# Patient Record
Sex: FEMALE | Race: WHITE | Employment: PART TIME | URBAN - NONMETROPOLITAN AREA
[De-identification: names, ages, dates, MRNs, and addresses within clinical notes are randomized per-mention and may not be internally consistent; named-entity substitution may affect disease eponyms.]

---

## 2020-01-26 ENCOUNTER — APPOINTMENT (OUTPATIENT)
Dept: GENERAL RADIOLOGY | Age: 65
DRG: 189 | End: 2020-01-26
Attending: INTERNAL MEDICINE
Payer: MEDICARE

## 2020-01-26 ENCOUNTER — APPOINTMENT (OUTPATIENT)
Dept: NUCLEAR MEDICINE | Age: 65
DRG: 189 | End: 2020-01-26
Attending: INTERNAL MEDICINE
Payer: MEDICARE

## 2020-01-26 ENCOUNTER — HOSPITAL ENCOUNTER (INPATIENT)
Age: 65
LOS: 5 days | Discharge: NURSING, REHAB OR LONG-TERM CARE | DRG: 189 | End: 2020-01-31
Attending: INTERNAL MEDICINE | Admitting: INTERNAL MEDICINE
Payer: MEDICARE

## 2020-01-26 PROBLEM — J96.01 ACUTE RESPIRATORY FAILURE WITH HYPOXEMIA (HCC): Status: ACTIVE | Noted: 2020-01-26

## 2020-01-26 LAB
ALBUMIN SERPL-MCNC: 3.2 G/DL (ref 3.5–5.1)
ALLEN TEST: POSITIVE
ALP BLD-CCNC: 42 U/L (ref 38–126)
ALT SERPL-CCNC: 8 U/L (ref 11–66)
ANION GAP SERPL CALCULATED.3IONS-SCNC: 15 MEQ/L (ref 8–16)
AST SERPL-CCNC: 23 U/L (ref 5–40)
BASE EXCESS (CALCULATED): -4.5 MMOL/L (ref -2.5–2.5)
BILIRUB SERPL-MCNC: 1.1 MG/DL (ref 0.3–1.2)
BUN BLDV-MCNC: 46 MG/DL (ref 7–22)
CALCIUM SERPL-MCNC: 8.4 MG/DL (ref 8.5–10.5)
CHLORIDE BLD-SCNC: 105 MEQ/L (ref 98–111)
CO2: 21 MEQ/L (ref 23–33)
COLLECTED BY:: ABNORMAL
CREAT SERPL-MCNC: 2.1 MG/DL (ref 0.4–1.2)
D-DIMER QUANTITATIVE: 1051 NG/ML FEU (ref 0–500)
DEVICE: ABNORMAL
GFR SERPL CREATININE-BSD FRML MDRD: 23 ML/MIN/1.73M2
GLUCOSE BLD-MCNC: 97 MG/DL (ref 70–108)
HCO3: 20 MMOL/L (ref 23–28)
IFIO2: 5
LACTIC ACID: 2.8 MMOL/L (ref 0.5–2.2)
MRSA SCREEN RT-PCR: NEGATIVE
O2 SATURATION: 91 %
PCO2: 36 MMHG (ref 35–45)
PH BLOOD GAS: 7.36 (ref 7.35–7.45)
PO2: 64 MMHG (ref 71–104)
POTASSIUM REFLEX MAGNESIUM: 4.5 MEQ/L (ref 3.5–5.2)
SCAN OF BLOOD SMEAR: NORMAL
SODIUM BLD-SCNC: 141 MEQ/L (ref 135–145)
SOURCE, BLOOD GAS: ABNORMAL
TOTAL PROTEIN: 6.2 G/DL (ref 6.1–8)
TROPONIN T: 0.05 NG/ML
VANCOMYCIN RESISTANT ENTEROCOCCUS: POSITIVE

## 2020-01-26 PROCEDURE — 94760 N-INVAS EAR/PLS OXIMETRY 1: CPT

## 2020-01-26 PROCEDURE — 82803 BLOOD GASES ANY COMBINATION: CPT

## 2020-01-26 PROCEDURE — 87641 MR-STAPH DNA AMP PROBE: CPT

## 2020-01-26 PROCEDURE — 2709999900 HC NON-CHARGEABLE SUPPLY

## 2020-01-26 PROCEDURE — 93005 ELECTROCARDIOGRAM TRACING: CPT | Performed by: INTERNAL MEDICINE

## 2020-01-26 PROCEDURE — 84484 ASSAY OF TROPONIN QUANT: CPT

## 2020-01-26 PROCEDURE — 2060000000 HC ICU INTERMEDIATE R&B

## 2020-01-26 PROCEDURE — 80053 COMPREHEN METABOLIC PANEL: CPT

## 2020-01-26 PROCEDURE — A9558 XE133 XENON 10MCI: HCPCS | Performed by: INTERNAL MEDICINE

## 2020-01-26 PROCEDURE — 2700000000 HC OXYGEN THERAPY PER DAY

## 2020-01-26 PROCEDURE — A9540 TC99M MAA: HCPCS | Performed by: INTERNAL MEDICINE

## 2020-01-26 PROCEDURE — 85379 FIBRIN DEGRADATION QUANT: CPT

## 2020-01-26 PROCEDURE — 87081 CULTURE SCREEN ONLY: CPT

## 2020-01-26 PROCEDURE — 2580000003 HC RX 258: Performed by: INTERNAL MEDICINE

## 2020-01-26 PROCEDURE — 78582 LUNG VENTILAT&PERFUS IMAGING: CPT

## 2020-01-26 PROCEDURE — 87500 VANOMYCIN DNA AMP PROBE: CPT

## 2020-01-26 PROCEDURE — 36415 COLL VENOUS BLD VENIPUNCTURE: CPT

## 2020-01-26 PROCEDURE — 81001 URINALYSIS AUTO W/SCOPE: CPT

## 2020-01-26 PROCEDURE — 36600 WITHDRAWAL OF ARTERIAL BLOOD: CPT

## 2020-01-26 PROCEDURE — 6360000002 HC RX W HCPCS: Performed by: INTERNAL MEDICINE

## 2020-01-26 PROCEDURE — 85025 COMPLETE CBC W/AUTO DIFF WBC: CPT

## 2020-01-26 PROCEDURE — 71045 X-RAY EXAM CHEST 1 VIEW: CPT

## 2020-01-26 PROCEDURE — 3430000000 HC RX DIAGNOSTIC RADIOPHARMACEUTICAL: Performed by: INTERNAL MEDICINE

## 2020-01-26 PROCEDURE — 83605 ASSAY OF LACTIC ACID: CPT

## 2020-01-26 PROCEDURE — 6370000000 HC RX 637 (ALT 250 FOR IP): Performed by: INTERNAL MEDICINE

## 2020-01-26 RX ORDER — ROSUVASTATIN CALCIUM 10 MG/1
10 TABLET, COATED ORAL NIGHTLY
Status: DISCONTINUED | OUTPATIENT
Start: 2020-01-26 | End: 2020-01-26

## 2020-01-26 RX ORDER — 0.9 % SODIUM CHLORIDE 0.9 %
1000 INTRAVENOUS SOLUTION INTRAVENOUS ONCE
Status: COMPLETED | OUTPATIENT
Start: 2020-01-26 | End: 2020-01-26

## 2020-01-26 RX ORDER — SODIUM CHLORIDE 0.9 % (FLUSH) 0.9 %
10 SYRINGE (ML) INJECTION PRN
Status: DISCONTINUED | OUTPATIENT
Start: 2020-01-26 | End: 2020-01-31 | Stop reason: HOSPADM

## 2020-01-26 RX ORDER — SIMVASTATIN 80 MG
80 TABLET ORAL NIGHTLY
COMMUNITY
End: 2020-06-24

## 2020-01-26 RX ORDER — XENON XE-133 10 MCI/1
5.4 GAS RESPIRATORY (INHALATION)
Status: COMPLETED | OUTPATIENT
Start: 2020-01-26 | End: 2020-01-26

## 2020-01-26 RX ORDER — HEPARIN SODIUM 1000 [USP'U]/ML
80 INJECTION, SOLUTION INTRAVENOUS; SUBCUTANEOUS PRN
Status: DISCONTINUED | OUTPATIENT
Start: 2020-01-26 | End: 2020-01-30

## 2020-01-26 RX ORDER — LEVOTHYROXINE SODIUM 0.03 MG/1
25 TABLET ORAL DAILY
Status: DISCONTINUED | OUTPATIENT
Start: 2020-01-27 | End: 2020-01-31

## 2020-01-26 RX ORDER — METOPROLOL SUCCINATE 50 MG/1
50 TABLET, EXTENDED RELEASE ORAL DAILY
COMMUNITY
End: 2022-07-20

## 2020-01-26 RX ORDER — HEPARIN SODIUM 10000 [USP'U]/100ML
18 INJECTION, SOLUTION INTRAVENOUS CONTINUOUS
Status: DISCONTINUED | OUTPATIENT
Start: 2020-01-27 | End: 2020-01-30

## 2020-01-26 RX ORDER — MORPHINE SULFATE 2 MG/ML
2 INJECTION, SOLUTION INTRAMUSCULAR; INTRAVENOUS
Status: DISCONTINUED | OUTPATIENT
Start: 2020-01-26 | End: 2020-01-31 | Stop reason: HOSPADM

## 2020-01-26 RX ORDER — PHENYTOIN SODIUM 100 MG/1
300 CAPSULE, EXTENDED RELEASE ORAL NIGHTLY
Status: DISCONTINUED | OUTPATIENT
Start: 2020-01-26 | End: 2020-01-26

## 2020-01-26 RX ORDER — ONDANSETRON 2 MG/ML
4 INJECTION INTRAMUSCULAR; INTRAVENOUS EVERY 4 HOURS PRN
Status: DISCONTINUED | OUTPATIENT
Start: 2020-01-26 | End: 2020-01-31 | Stop reason: HOSPADM

## 2020-01-26 RX ORDER — HEPARIN SODIUM 1000 [USP'U]/ML
80 INJECTION, SOLUTION INTRAVENOUS; SUBCUTANEOUS ONCE
Status: COMPLETED | OUTPATIENT
Start: 2020-01-27 | End: 2020-01-27

## 2020-01-26 RX ORDER — IPRATROPIUM BROMIDE AND ALBUTEROL SULFATE 2.5; .5 MG/3ML; MG/3ML
1 SOLUTION RESPIRATORY (INHALATION) EVERY 4 HOURS PRN
Status: DISCONTINUED | OUTPATIENT
Start: 2020-01-26 | End: 2020-01-31

## 2020-01-26 RX ORDER — SODIUM CHLORIDE 9 MG/ML
INJECTION, SOLUTION INTRAVENOUS CONTINUOUS
Status: DISCONTINUED | OUTPATIENT
Start: 2020-01-26 | End: 2020-01-30

## 2020-01-26 RX ORDER — HYDROCODONE BITARTRATE AND ACETAMINOPHEN 5; 325 MG/1; MG/1
1 TABLET ORAL EVERY 4 HOURS PRN
Status: DISCONTINUED | OUTPATIENT
Start: 2020-01-26 | End: 2020-01-31

## 2020-01-26 RX ORDER — LEVETIRACETAM 1000 MG/1
TABLET ORAL 2 TIMES DAILY
COMMUNITY

## 2020-01-26 RX ORDER — PHENOBARBITAL 32.4 MG/1
97.2 TABLET ORAL DAILY
Status: DISCONTINUED | OUTPATIENT
Start: 2020-01-26 | End: 2020-01-26

## 2020-01-26 RX ORDER — LEVETIRACETAM 500 MG/1
1000 TABLET ORAL 2 TIMES DAILY
Status: DISCONTINUED | OUTPATIENT
Start: 2020-01-26 | End: 2020-01-31

## 2020-01-26 RX ORDER — ONDANSETRON 2 MG/ML
4 INJECTION INTRAMUSCULAR; INTRAVENOUS EVERY 6 HOURS PRN
Status: DISCONTINUED | OUTPATIENT
Start: 2020-01-26 | End: 2020-01-26 | Stop reason: SDUPTHER

## 2020-01-26 RX ORDER — HYDROCODONE BITARTRATE AND ACETAMINOPHEN 5; 325 MG/1; MG/1
2 TABLET ORAL EVERY 4 HOURS PRN
Status: DISCONTINUED | OUTPATIENT
Start: 2020-01-26 | End: 2020-01-31

## 2020-01-26 RX ORDER — SODIUM CHLORIDE 0.9 % (FLUSH) 0.9 %
10 SYRINGE (ML) INJECTION EVERY 12 HOURS SCHEDULED
Status: DISCONTINUED | OUTPATIENT
Start: 2020-01-26 | End: 2020-01-31 | Stop reason: HOSPADM

## 2020-01-26 RX ORDER — ASPIRIN 81 MG/1
81 TABLET ORAL DAILY
Status: ON HOLD | COMMUNITY
End: 2020-01-31 | Stop reason: HOSPADM

## 2020-01-26 RX ORDER — SIMVASTATIN 40 MG
80 TABLET ORAL NIGHTLY
Status: DISCONTINUED | OUTPATIENT
Start: 2020-01-26 | End: 2020-01-31

## 2020-01-26 RX ORDER — HEPARIN SODIUM 1000 [USP'U]/ML
40 INJECTION, SOLUTION INTRAVENOUS; SUBCUTANEOUS PRN
Status: DISCONTINUED | OUTPATIENT
Start: 2020-01-26 | End: 2020-01-30

## 2020-01-26 RX ADMIN — SODIUM CHLORIDE 1000 ML: 9 INJECTION, SOLUTION INTRAVENOUS at 18:49

## 2020-01-26 RX ADMIN — Medication 3 MILLICURIE: at 22:44

## 2020-01-26 RX ADMIN — LEVETIRACETAM 1000 MG: 500 TABLET, FILM COATED ORAL at 22:02

## 2020-01-26 RX ADMIN — SODIUM CHLORIDE: 9 INJECTION, SOLUTION INTRAVENOUS at 23:12

## 2020-01-26 RX ADMIN — CEFTRIAXONE SODIUM 1 G: 1 INJECTION, POWDER, FOR SOLUTION INTRAMUSCULAR; INTRAVENOUS at 23:54

## 2020-01-26 RX ADMIN — SIMVASTATIN 80 MG: 40 TABLET, FILM COATED ORAL at 22:02

## 2020-01-26 RX ADMIN — XENON XE-133 5.4 MILLICURIE: 10 GAS RESPIRATORY (INHALATION) at 22:37

## 2020-01-26 RX ADMIN — CALCIUM ACETATE 667 MG: 667 CAPSULE ORAL at 22:02

## 2020-01-26 ASSESSMENT — PAIN DESCRIPTION - ONSET
ONSET: GRADUAL
ONSET: GRADUAL

## 2020-01-26 ASSESSMENT — PAIN DESCRIPTION - DESCRIPTORS
DESCRIPTORS: SORE;DISCOMFORT
DESCRIPTORS: SORE;DISCOMFORT

## 2020-01-26 ASSESSMENT — PAIN DESCRIPTION - PAIN TYPE
TYPE: SURGICAL PAIN
TYPE: SURGICAL PAIN

## 2020-01-26 ASSESSMENT — PAIN - FUNCTIONAL ASSESSMENT
PAIN_FUNCTIONAL_ASSESSMENT: PREVENTS OR INTERFERES WITH MANY ACTIVE NOT PASSIVE ACTIVITIES
PAIN_FUNCTIONAL_ASSESSMENT: PREVENTS OR INTERFERES WITH MANY ACTIVE NOT PASSIVE ACTIVITIES

## 2020-01-26 ASSESSMENT — PAIN DESCRIPTION - ORIENTATION
ORIENTATION: LEFT
ORIENTATION: LEFT

## 2020-01-26 ASSESSMENT — PAIN DESCRIPTION - LOCATION
LOCATION: KNEE
LOCATION: KNEE

## 2020-01-26 ASSESSMENT — PAIN SCALES - GENERAL
PAINLEVEL_OUTOF10: 3
PAINLEVEL_OUTOF10: 3

## 2020-01-26 ASSESSMENT — PAIN DESCRIPTION - PROGRESSION: CLINICAL_PROGRESSION: NOT CHANGED

## 2020-01-26 ASSESSMENT — PAIN DESCRIPTION - FREQUENCY
FREQUENCY: CONTINUOUS
FREQUENCY: CONTINUOUS

## 2020-01-26 NOTE — FLOWSHEET NOTE
Doctor up to see patient.  Received verbal order for 0.9 bolus at 250 mL/hr.      01/26/20 1895   Provider Notification   Reason for Communication Evaluate   Provider Name Dr. Michelle Wilks   Provider Notification Physician   Method of Communication Secure Message   Notification Time 3196

## 2020-01-26 NOTE — H&P
Internal Medicine  History and Physical    Patient:  Gigi Crum  MRN: [id number]      History Obtained From:  patient  PCP: Hari Miller MD    CHIEF COMPLAINT:  SOB, hypoxemia, afib, hypotension post op    HISTORY OF PRESENT ILLNESS:   The patient is a 75 y.o. female who had a left TKR done at [facility] 2 days ago. She was recuperating well when she suddenly became hypoxemic, hypotensive and flipped into a-fib with RVR-HR in the 150s today. She was bolused with IVF and transferred to acute bed at [facility] for further care. She reports pain in the rt flank, she has SOB, mild cough, no CP, no hemoptysis. She admitted to a h/o paroxysmal a fib in the past.    Past Medical History:        Diagnosis Date    Hyperlipidemia     Hypertension     Hypothyroidism     Obesity (BMI 30-39. 9)     Seizures ([address]     last sz 20+years ago    Vitamin D deficiency        Past Surgical History:        Procedure Laterality Date    APPENDECTOMY  1978    CATARACT REMOVAL  2004   [facility]    JOINT REPLACEMENT  7/6/12    left hip       Medications Prior to Admission:    Prior to Admission medications    Medication Sig Start Date End Date Taking? Authorizing Provider   simvastatin (ZOCOR) 80 MG tablet Take 80 mg by mouth nightly    Historical Provider, MD   metoprolol succinate (TOPROL XL) 50 MG extended release tablet Take 50 mg by mouth daily    Historical Provider, MD   levETIRAcetam (KEPPRA) 1000 MG tablet Take 1,000 mg by mouth 2 times daily    Historical Provider, MD   aspirin 81 MG EC tablet Take 81 mg by mouth daily    Historical Provider, MD   ibuprofen (ADVIL;MOTRIN) 200 MG tablet Take 400 mg by mouth every 8 hours as needed     Historical Provider, MD   calcium acetate (PHOSLO) 667 MG capsule Take  by mouth 2 times daily. Historical Provider, MD   phenytoin (DILANTIN) 100 MG ER capsule Take 300 mg by mouth nightly.       Historical Provider, MD   vitamin D (CHOLECALCIFEROL) 1000 UNIT TABS tablet Take 1,000 Units by mouth 3 times daily. Historical Provider, MD   levothyroxine (SYNTHROID) 25 MCG tablet Take 25 mcg by mouth Daily. Historical Provider, MD       Allergies:  Latex and Pcn [penicillins]    Social History:   TOBACCO:   reports that she has never smoked. She has never used smokeless tobacco.  ETOH:   reports no history of alcohol use. Family History:       Problem Relation Age of Onset    Arthritis Father     Arthritis Mother     Diabetes Brother        REVIEW OF SYSTEMS:  CONSTITUTIONAL:  negative for  fevers and chills  EYES:  negative for  irritation and redness  HEENT:  negative for  sore throat and hoarseness  RESPIRATORY:  positive for  dry cough and dyspnea  negative for  wheezing, hemoptysis and chest pain  CARDIOVASCULAR:  negative for  palpitations, orthopnea, PND  GASTROINTESTINAL:  negative for nausea and vomiting  GENITOURINARY:  negative for frequency, dysuria and hematuria  ENDOCRINE:  negative for heat intolerance and cold intolerance  MUSCULOSKELETAL:  positive for  Left knee pain post op  negative for  myalgias  NEUROLOGICAL:  negative for headaches, seizures and memory problems  BEHAVIOR/PSYCH:  negative for increased agitation and anxiety    Physical Exam:    Vitals: BP (!) 89/56   Pulse 152   Temp 98.3 °F (36.8 °C) (Oral)   Resp 18   SpO2 90% Comment: Simultaneous filing. User may not have seen previous data.   CONSTITUTIONAL:  fatigued, alert, cooperative, mild distress and mildly obese  EYES:  extra-ocular muscles intact  ENT:  normocepalic, without obvious abnormality  NECK:  supple, symmetrical, trachea midline  LUNGS:  tachypneic and diminished breath sounds throughout lungs  CARDIOVASCULAR:  normal S1 and S2 and no edema  ABDOMEN:  normal bowel sounds, soft, non-distended, non-tender and no hepatosplenomegally  MUSCULOSKELETAL:  Dressing to left knee, no calves tenderness  NEUROLOGIC:  Mental Status Exam:  Level of Alertness:

## 2020-01-26 NOTE — FLOWSHEET NOTE
01/26/20 1746   Provider Notification   Reason for Communication Evaluate   Provider Name Dr. Tiffanie Cavazos   Provider Notification Physician   Method of Communication Secure Message   Notification Time    Notified doctor of patient's arrival to [address] [place] [address].

## 2020-01-27 ENCOUNTER — APPOINTMENT (OUTPATIENT)
Dept: INTERVENTIONAL RADIOLOGY/VASCULAR | Age: 65
DRG: 189 | End: 2020-01-27
Attending: INTERNAL MEDICINE
Payer: MEDICARE

## 2020-01-27 ENCOUNTER — APPOINTMENT (OUTPATIENT)
Dept: ULTRASOUND IMAGING | Age: 65
DRG: 189 | End: 2020-01-27
Attending: INTERNAL MEDICINE
Payer: MEDICARE

## 2020-01-27 LAB
ANION GAP SERPL CALCULATED.3IONS-SCNC: 14 MEQ/L (ref 8–16)
APTT: 100 SECONDS (ref 22–38)
APTT: 133.8 SECONDS (ref 22–38)
APTT: > 200 SECONDS (ref 22–38)
BACTERIA: ABNORMAL
BASOPHILS # BLD: 0.4 %
BASOPHILS ABSOLUTE: 0 THOU/MM3 (ref 0–0.1)
BILIRUBIN URINE: ABNORMAL
BLOOD, URINE: ABNORMAL
BUN BLDV-MCNC: 51 MG/DL (ref 7–22)
CALCIUM SERPL-MCNC: 8.2 MG/DL (ref 8.5–10.5)
CASTS: ABNORMAL /LPF
CASTS: ABNORMAL /LPF
CHARACTER, URINE: ABNORMAL
CHLORIDE BLD-SCNC: 106 MEQ/L (ref 98–111)
CHLORIDE, URINE: 30 MEQ/L
CO2: 20 MEQ/L (ref 23–33)
COLOR: ABNORMAL
CREAT SERPL-MCNC: 2.6 MG/DL (ref 0.4–1.2)
CRYSTALS: ABNORMAL
DIFFERENTIAL TYPE: ABNORMAL
DOHLE BODIES: PRESENT
EKG ATRIAL RATE: 136 BPM
EKG ATRIAL RATE: 95 BPM
EKG P AXIS: 55 DEGREES
EKG P-R INTERVAL: 132 MS
EKG Q-T INTERVAL: 326 MS
EKG Q-T INTERVAL: 382 MS
EKG QRS DURATION: 120 MS
EKG QRS DURATION: 120 MS
EKG QTC CALCULATION (BAZETT): 480 MS
EKG QTC CALCULATION (BAZETT): 496 MS
EKG R AXIS: -40 DEGREES
EKG R AXIS: -43 DEGREES
EKG T AXIS: -2 DEGREES
EKG T AXIS: 3 DEGREES
EKG VENTRICULAR RATE: 139 BPM
EKG VENTRICULAR RATE: 95 BPM
EOSINOPHIL # BLD: 0.2 %
EOSINOPHILS ABSOLUTE: 0 THOU/MM3 (ref 0–0.4)
EPITHELIAL CELLS, UA: ABNORMAL /HPF
ERYTHROCYTE [DISTWIDTH] IN BLOOD BY AUTOMATED COUNT: 13.6 % (ref 11.5–14.5)
ERYTHROCYTE [DISTWIDTH] IN BLOOD BY AUTOMATED COUNT: 13.8 % (ref 11.5–14.5)
ERYTHROCYTE [DISTWIDTH] IN BLOOD BY AUTOMATED COUNT: 13.8 % (ref 11.5–14.5)
ERYTHROCYTE [DISTWIDTH] IN BLOOD BY AUTOMATED COUNT: 46.2 FL (ref 35–45)
ERYTHROCYTE [DISTWIDTH] IN BLOOD BY AUTOMATED COUNT: 46.9 FL (ref 35–45)
ERYTHROCYTE [DISTWIDTH] IN BLOOD BY AUTOMATED COUNT: 47.1 FL (ref 35–45)
GFR SERPL CREATININE-BSD FRML MDRD: 18 ML/MIN/1.73M2
GLUCOSE BLD-MCNC: 100 MG/DL (ref 70–108)
GLUCOSE, URINE: NEGATIVE MG/DL
HCT VFR BLD CALC: 39.3 % (ref 37–47)
HCT VFR BLD CALC: 39.8 % (ref 37–47)
HCT VFR BLD CALC: 42.1 % (ref 37–47)
HEMOGLOBIN: 12.2 GM/DL (ref 12–16)
HEMOGLOBIN: 12.2 GM/DL (ref 12–16)
HEMOGLOBIN: 13.1 GM/DL (ref 12–16)
ICTOTEST: NEGATIVE
IMMATURE GRANS (ABS): 0.07 THOU/MM3 (ref 0–0.07)
IMMATURE GRANULOCYTES: 0.6 %
KETONES, URINE: 15
LACTIC ACID: 2.7 MMOL/L (ref 0.5–2.2)
LEUKOCYTE EST, POC: ABNORMAL
LV EF: 58 %
LVEF MODALITY: NORMAL
LYMPHOCYTES # BLD: 5.5 %
LYMPHOCYTES ABSOLUTE: 0.7 THOU/MM3 (ref 1–4.8)
MCH RBC QN AUTO: 28.7 PG (ref 26–33)
MCH RBC QN AUTO: 28.8 PG (ref 26–33)
MCH RBC QN AUTO: 29 PG (ref 26–33)
MCHC RBC AUTO-ENTMCNC: 30.7 GM/DL (ref 32.2–35.5)
MCHC RBC AUTO-ENTMCNC: 31 GM/DL (ref 32.2–35.5)
MCHC RBC AUTO-ENTMCNC: 31.1 GM/DL (ref 32.2–35.5)
MCV RBC AUTO: 92.1 FL (ref 81–99)
MCV RBC AUTO: 92.7 FL (ref 81–99)
MCV RBC AUTO: 94.5 FL (ref 81–99)
MISCELLANEOUS LAB TEST RESULT: ABNORMAL
MONOCYTES # BLD: 6.5 %
MONOCYTES ABSOLUTE: 0.8 THOU/MM3 (ref 0.4–1.3)
NITRITE, URINE: NEGATIVE
NUCLEATED RED BLOOD CELLS: 0 /100 WBC
PATHOLOGIST REVIEW: ABNORMAL
PH UA: 5 (ref 5–9)
PHOSPHORUS: 4.7 MG/DL (ref 2.4–4.7)
PLATELET # BLD: 202 THOU/MM3 (ref 130–400)
PLATELET # BLD: 205 THOU/MM3 (ref 130–400)
PLATELET # BLD: 228 THOU/MM3 (ref 130–400)
PLATELET ESTIMATE: ADEQUATE
PMV BLD AUTO: 10 FL (ref 9.4–12.4)
PMV BLD AUTO: 10 FL (ref 9.4–12.4)
PMV BLD AUTO: 10.4 FL (ref 9.4–12.4)
POIKILOCYTES: ABNORMAL
POTASSIUM SERPL-SCNC: 5.2 MEQ/L (ref 3.5–5.2)
PROTEIN UA: 100 MG/DL
RBC # BLD: 4.21 MILL/MM3 (ref 4.2–5.4)
RBC # BLD: 4.24 MILL/MM3 (ref 4.2–5.4)
RBC # BLD: 4.57 MILL/MM3 (ref 4.2–5.4)
RBC URINE: ABNORMAL /HPF
RENAL EPITHELIAL, UA: ABNORMAL
SEG NEUTROPHILS: 86.8 %
SEGMENTED NEUTROPHILS ABSOLUTE COUNT: 10.5 THOU/MM3 (ref 1.8–7.7)
SODIUM BLD-SCNC: 140 MEQ/L (ref 135–145)
SODIUM URINE: 47 MEQ/L
SPECIFIC GRAVITY UA: > 1.03 (ref 1–1.03)
TOTAL CK: 197 U/L (ref 30–135)
TROPONIN T: 0.1 NG/ML
TROPONIN T: 0.12 NG/ML
TSH SERPL DL<=0.05 MIU/L-ACNC: 1.14 UIU/ML (ref 0.4–4.2)
UROBILINOGEN, URINE: 1 EU/DL (ref 0–1)
WBC # BLD: 11.9 THOU/MM3 (ref 4.8–10.8)
WBC # BLD: 12.1 THOU/MM3 (ref 4.8–10.8)
WBC # BLD: 13.3 THOU/MM3 (ref 4.8–10.8)
WBC UA: ABNORMAL /HPF
YEAST: ABNORMAL

## 2020-01-27 PROCEDURE — 99222 1ST HOSP IP/OBS MODERATE 55: CPT | Performed by: INTERNAL MEDICINE

## 2020-01-27 PROCEDURE — 99223 1ST HOSP IP/OBS HIGH 75: CPT | Performed by: INTERNAL MEDICINE

## 2020-01-27 PROCEDURE — 84100 ASSAY OF PHOSPHORUS: CPT

## 2020-01-27 PROCEDURE — 82436 ASSAY OF URINE CHLORIDE: CPT

## 2020-01-27 PROCEDURE — 76770 US EXAM ABDO BACK WALL COMP: CPT

## 2020-01-27 PROCEDURE — 82550 ASSAY OF CK (CPK): CPT

## 2020-01-27 PROCEDURE — 83605 ASSAY OF LACTIC ACID: CPT

## 2020-01-27 PROCEDURE — 2060000000 HC ICU INTERMEDIATE R&B

## 2020-01-27 PROCEDURE — 80048 BASIC METABOLIC PNL TOTAL CA: CPT

## 2020-01-27 PROCEDURE — 85027 COMPLETE CBC AUTOMATED: CPT

## 2020-01-27 PROCEDURE — 51702 INSERT TEMP BLADDER CATH: CPT

## 2020-01-27 PROCEDURE — 93306 TTE W/DOPPLER COMPLETE: CPT

## 2020-01-27 PROCEDURE — 84484 ASSAY OF TROPONIN QUANT: CPT

## 2020-01-27 PROCEDURE — 93970 EXTREMITY STUDY: CPT

## 2020-01-27 PROCEDURE — 6360000002 HC RX W HCPCS: Performed by: INTERNAL MEDICINE

## 2020-01-27 PROCEDURE — 84443 ASSAY THYROID STIM HORMONE: CPT

## 2020-01-27 PROCEDURE — 85730 THROMBOPLASTIN TIME PARTIAL: CPT

## 2020-01-27 PROCEDURE — 6370000000 HC RX 637 (ALT 250 FOR IP): Performed by: INTERNAL MEDICINE

## 2020-01-27 PROCEDURE — 2580000003 HC RX 258: Performed by: INTERNAL MEDICINE

## 2020-01-27 PROCEDURE — 6370000000 HC RX 637 (ALT 250 FOR IP): Performed by: PHYSICIAN ASSISTANT

## 2020-01-27 PROCEDURE — 2709999900 HC NON-CHARGEABLE SUPPLY

## 2020-01-27 PROCEDURE — 84300 ASSAY OF URINE SODIUM: CPT

## 2020-01-27 PROCEDURE — 36415 COLL VENOUS BLD VENIPUNCTURE: CPT

## 2020-01-27 RX ORDER — FUROSEMIDE 10 MG/ML
40 INJECTION INTRAMUSCULAR; INTRAVENOUS ONCE
Status: COMPLETED | OUTPATIENT
Start: 2020-01-27 | End: 2020-01-27

## 2020-01-27 RX ORDER — LOPERAMIDE HYDROCHLORIDE 2 MG/1
2 CAPSULE ORAL 3 TIMES DAILY PRN
Status: DISCONTINUED | OUTPATIENT
Start: 2020-01-27 | End: 2020-01-31

## 2020-01-27 RX ADMIN — HEPARIN SODIUM 18 UNITS/KG/HR: 10000 INJECTION, SOLUTION INTRAVENOUS at 00:05

## 2020-01-27 RX ADMIN — CALCIUM ACETATE 667 MG: 667 CAPSULE ORAL at 08:05

## 2020-01-27 RX ADMIN — SODIUM CHLORIDE: 9 INJECTION, SOLUTION INTRAVENOUS at 21:11

## 2020-01-27 RX ADMIN — SIMVASTATIN 80 MG: 40 TABLET, FILM COATED ORAL at 20:46

## 2020-01-27 RX ADMIN — HEPARIN SODIUM 6 UNITS/KG/HR: 10000 INJECTION, SOLUTION INTRAVENOUS at 21:13

## 2020-01-27 RX ADMIN — LEVOTHYROXINE SODIUM 25 MCG: 25 TABLET ORAL at 06:22

## 2020-01-27 RX ADMIN — LOPERAMIDE HYDROCHLORIDE 2 MG: 2 CAPSULE ORAL at 17:51

## 2020-01-27 RX ADMIN — LEVETIRACETAM 1000 MG: 500 TABLET, FILM COATED ORAL at 08:05

## 2020-01-27 RX ADMIN — FUROSEMIDE 40 MG: 40 INJECTION, SOLUTION INTRAMUSCULAR; INTRAVENOUS at 13:11

## 2020-01-27 RX ADMIN — LEVETIRACETAM 1000 MG: 500 TABLET, FILM COATED ORAL at 20:46

## 2020-01-27 RX ADMIN — SODIUM CHLORIDE: 9 INJECTION, SOLUTION INTRAVENOUS at 15:53

## 2020-01-27 RX ADMIN — HEPARIN SODIUM 7820 UNITS: 1000 INJECTION INTRAVENOUS; SUBCUTANEOUS at 00:05

## 2020-01-27 RX ADMIN — CALCIUM ACETATE 667 MG: 667 CAPSULE ORAL at 20:46

## 2020-01-27 ASSESSMENT — PAIN SCALES - GENERAL
PAINLEVEL_OUTOF10: 0
PAINLEVEL_OUTOF10: 3

## 2020-01-27 ASSESSMENT — PAIN - FUNCTIONAL ASSESSMENT: PAIN_FUNCTIONAL_ASSESSMENT: PREVENTS OR INTERFERES SOME ACTIVE ACTIVITIES AND ADLS

## 2020-01-27 ASSESSMENT — PAIN DESCRIPTION - ONSET: ONSET: ON-GOING

## 2020-01-27 ASSESSMENT — PAIN DESCRIPTION - FREQUENCY: FREQUENCY: CONTINUOUS

## 2020-01-27 ASSESSMENT — PAIN DESCRIPTION - LOCATION: LOCATION: HIP

## 2020-01-27 ASSESSMENT — PAIN DESCRIPTION - ORIENTATION: ORIENTATION: RIGHT

## 2020-01-27 ASSESSMENT — PAIN DESCRIPTION - PROGRESSION: CLINICAL_PROGRESSION: GRADUALLY WORSENING

## 2020-01-27 ASSESSMENT — PAIN DESCRIPTION - DESCRIPTORS: DESCRIPTORS: ACHING;CONSTANT;DISCOMFORT

## 2020-01-27 ASSESSMENT — PAIN DESCRIPTION - PAIN TYPE: TYPE: SURGICAL PAIN

## 2020-01-27 NOTE — CONSULTS
Narrative    Not on file     Family History          Problem Relation Age of Onset    Arthritis Father     Arthritis Mother     Diabetes Brother      Sleep History    Has sleep apnea, uses a CPAP machine at night. She sees someone from [employer] who manages it. 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 History of tobacco smoking:No  .  History of recreational or IV drug use in the past:NO     History of exposure to coal mines/coal dust: NO  History of exposure to foundry dust/welding: NO  History of exposure to quarry/silica/sandblasting: NO  History of exposure to asbestos/working with breaks/ships: NO  History of exposure to farm dust: NO  History of recent travel to long distances: NO    History of pulmonary embolism in the past: No            History of DVT in the past:No            Riview of systems   General/Constitutional: No recent loss of weight or appetite changes. No fever or chills. HENT: Negative. Eyes: Negative. Upper respiratory tract: No nasal stuffiness or post nasal drip. Lower respiratory tract/ lungs: See HPI for details. No hemoptysis. Cardiovascular: No palpitations or chest pain. Gastrointestinal: Reports some nausea and diarrhea over the weekend, denies vomiting. Neurological: No focal neurologiacal weakness. Extremities: No edema. Musculoskeletal: No complaints. Genitourinary: No complaints. Hematological: Negative. Psychiatric/Behavioral: Negative. Skin: No itching. Vitals     height is 4' 11\" (1.499 m) and weight is 220 lb 8 oz (100 kg). Her oral temperature is 98.4 °F (36.9 °C). Her blood pressure is 113/56 (abnormal) and her pulse is 76. Her respiration is 18 and oxygen saturation is 93%. Body mass index is 44.54 kg/m².     SUPPLEMENTAL O2: O2 Flow Rate (L/min): 6 L/min     I/O        Intake/Output Summary (Last 24 hours) at 1/27/2020 0017  Last data filed at 1/27/2020 0340  Gross per 24 hour   Intake 1874.7 ml   Output 300 ml   Net 1574.7 ml     I/O last 3 completed shifts: In: 1874.7 [P.O.:300; I.V.:1574.7]  Out: 300 [Urine:300]   Patient Vitals for the past 96 hrs (Last 3 readings):   Weight   01/27/20 0340 220 lb 8 oz (100 kg)   01/26/20 1945 215 lb 8 oz (97.8 kg)       Exam   General Appearance: Patient appears moderately built and well nourished in no acute distress  HEENT: Normal, Head is normocephalic, atraumatic. Oropharynx is clear and moist.  No oral thrush. PERRLA  Neck - Supple, No JVD present. No tracheal deviation present. Lungs - Bilateral air entry present. Bilateral aeration good. no wheezes, rales or rhonchi,   Cardiovascular - Irregularly irregular rhythm, normal rate without murmur, gallop or rub. Abdomen - Tenderness in RLQ, soft, nondistended, no masses or organomegaly  Neurologic - Awake, alert, oriented. There are no focal motor or sensory deficits  Extremities - Left leg is wrapped, pt had total knee replacement on Friday. No cyanosis, clubbing or edema. Musculoskeletal: Normal range of motion. Patient exhibits no tenderness. Lymphadenopathy:  No cervical adenopathy. Psychiatric: Patient  has a normal mood and affect. Skin - No bruising or bleeding.     Labs  - Old records and notes have been reviewed in [facility]   ABG  Lab Results   Component Value Date    PH 7.36 01/26/2020    PO2 64 01/26/2020    PCO2 36 01/26/2020    HCO3 20 01/26/2020    O2SAT 91 01/26/2020     Lab Results   Component Value Date    IFIO2 5 01/26/2020     CBC  Recent Labs     01/26/20  1919 01/27/20  0056 01/27/20  0558   WBC 12.1* 13.3* 11.9*   RBC 4.57 4.24 4.21   HGB 13.1 12.2 12.2   HCT 42.1 39.3 39.8   MCV 92.1 92.7 94.5   MCH 28.7 28.8 29.0   MCHC 31.1* 31.0* 30.7*    205 202   MPV 10.0 10.0 10.4      BMP  Recent Labs     01/26/20 1919 01/27/20  0558    140   K 4.5 5.2    106   CO2 21* 20*   BUN 46* 51*   CREATININE 2.1* 2.6*   GLUCOSE 97 CPAP.  3. Hx. Of paroxysmal afib. Rate controlled on metoprolol. Management per primary and cardiology. 4. Pt. Had surgery on Friday 1/24. Management per primary and orthopedics  5. GRANT. Pt. Creatinine 2.6. up from 2.1 on 1/26. Pt. Was given fluids, not a candidate for CTA chest or CT with contrast. Management per primary and nephrology. 6. Management per primary. 7. Management per primary. \"Thank you for asking us to see this patient\"     Case discussed with nurse and patient/family. Questions and concerns addressed. Electronically signed by   Calderon Interiano on 1/27/2020 at 8:33 AM     Addendum by Dr. Judson Damian MD:  I have seen and examined the patient independently. Face to face evaluation and examination was performed. The above evaluation and note has been reviewed. Labs and radiographs were reviewed. I Have discussed with Dr. Calderon Interiano, Medical student about this patient in detail. The above assessment and plan has been reviewed. Please see my modifications mentioned below. My modifications:  She is in no acute distress. Venous duplex scan:  Jan 27, 2020   PROCEDURE: VL DUP LOWER EXTREMITY VENOUS BILATERAL   No evidence of deep venous thrombosis in either lower extremity. Echocardiogram: 1/27/20  Official report is pending. Contra indications for tPA:  -History of hemorrhagic stroke- No  -Active intracranial neoplasm- no   -Recent (<2 months) intracranial surgery or trauma- No  -Active or recent internal bleeding in prior 6 months- No  -Bleeding diathesis- No  -Uncontrolled severe hypertension (systolic BP >945 mmHg or diastolic BP >962 mmHg) - NO  -Nonhemorrhagic stroke within prior 2 months-No  - Surgery within the previous 10 days- Yes.  -Thrombocytopenia (<100,000 platelets per mm3)    Assessment:  Provoked pulmonary embolism. Patient is hemodynamically stable. Continue home CPAP during sleep.   Will obtain old records [facility] or from her DME for review.     Fran Camacho MD 1/27/2020 4:02 PM

## 2020-01-27 NOTE — CONSULTS
[facility]  Consult    Patient's Name/Date of Birth: Rufina Bhatt / 1955 (89 y.o.)    Date: January 27, 2020     Referring Provider: Frandy Shen MD    CHIEF COMPLAINT: afib RVR      HPI: This is a pleasant 75 y.o. female presents with left TKR done 3 days prior. She was doing well post-op and flipped into Afib RVR with hypotension and hypoxemia into the 150s. IV fluids given. Transferred to [facility]. No prior cardiac history, but does admit to PAF. No DVT on duplex. V/q scan is indeterminate probability. Cr 2.6, Trop 0.102--0.122, TSH 1.1, Hgb 12. ECG now NSR RBBB LAD, inferior infarct. She has not taken [address]. Currently, no chest pain, angina, ELLISON, orthopnea, PND, sob at rest, palpitations, LE edema, or syncope. Echo: No results found for this or any previous visit. All labs, EKG's, diagnostic testing and images as well as cardiac cath, stress testing were reviewed during this encounter    Past Medical History:   Diagnosis Date    Hyperlipidemia     Hypertension     Hypothyroidism     Obesity (BMI 30-39. 9)     Seizures ([address]     last sz 20+years ago    Vitamin D deficiency      Past Surgical History:   Procedure Laterality Date    APPENDECTOMY  1978    CATARACT REMOVAL  2004   [facility]    JOINT REPLACEMENT  7/6/12    left hip     Current Facility-Administered Medications   Medication Dose Route Frequency Provider Last Rate Last Dose    calcium acetate (PHOSLO) capsule 667 mg  667 mg Oral BID Frandy Shen MD   667 mg at 01/27/20 0805    levothyroxine (SYNTHROID) tablet 25 mcg  25 mcg Oral Daily Frandy Shen MD   25 mcg at 01/27/20 0622    sodium chloride flush 0.9 % injection 10 mL  10 mL Intravenous 2 times per day Frandy Shen MD        sodium chloride flush 0.9 % injection 10 mL  10 mL Intravenous PRN Frandy Shen MD        magnesium hydroxide (MILK OF MAGNESIA) 400 MG/5ML suspension 30 mL  30 mL Oral Daily PRN Shad Torres MD        0.9 % sodium chloride infusion   Intravenous Continuous Shad Torres  mL/hr at 01/26/20 2312      HYDROcodone-acetaminophen (NORCO) 5-325 MG per tablet 1 tablet  1 tablet Oral Q4H PRN Shad Torres MD        Or    HYDROcodone-acetaminophen (NORCO) 5-325 MG per tablet 2 tablet  2 tablet Oral Q4H PRN Shad Torres MD        morphine (PF) injection 2 mg  2 mg Intravenous Q3H PRN Shad Torres MD        ondansetron (ZOFRAN) injection 4 mg  4 mg Intravenous Q4H PRN Shad Torres MD        ipratropium-albuterol (DUONEB) nebulizer solution 1 ampule  1 ampule Inhalation Q4H PRN Shad Torres MD        levETIRAcetam (KEPPRA) tablet 1,000 mg  1,000 mg Oral BID Shad Torres MD   1,000 mg at 01/27/20 0805    simvastatin (ZOCOR) tablet 80 mg  80 mg Oral Nightly Shad Torres MD   80 mg at 01/26/20 2202    heparin (porcine) injection 7,820 Units  80 Units/kg Intravenous PRN Shad Torres MD        heparin (porcine) injection 3,910 Units  40 Units/kg Intravenous PRN Shad Torres MD        heparin 25,000 units in dextrose 5% 250 mL infusion  18 Units/kg/hr Intravenous Continuous Shad Torres MD 13.7 mL/hr at 01/27/20 0806 14 Units/kg/hr at 01/27/20 0806     Prior to Admission medications    Medication Sig Start Date End Date Taking? Authorizing Provider   simvastatin (ZOCOR) 80 MG tablet Take 80 mg by mouth nightly    Historical Provider, MD   metoprolol succinate (TOPROL XL) 50 MG extended release tablet Take 50 mg by mouth daily    Historical Provider, MD   levETIRAcetam (KEPPRA) 1000 MG tablet Take 1,000 mg by mouth 2 times daily    Historical Provider, MD   aspirin 81 MG EC tablet Take 81 mg by mouth daily    Historical Provider, MD   ibuprofen (ADVIL;MOTRIN) 200 MG tablet Take 400 mg by mouth every 8 hours as needed     Historical Provider, MD   calcium acetate (PHOSLO) 667 MG capsule Take  by mouth 2 times daily.       Historical Provider, MD   phenytoin allowing us to participate in the care of this patient. Please do not hesitate to call us with questions.     Electronically signed by Helen Young MD on 1/27/2020 at 12:23 PM    Interventional Cardiology - [facility]

## 2020-01-27 NOTE — PROGRESS NOTES
Elevated aptt. Spoke to pharmacy to hold for an hour.  Decrease by 4 units/kg/hr and recheck in 4 hours after restarting heparin

## 2020-01-27 NOTE — PROGRESS NOTES
Pt arrived in 4K 24 via direct admit. Complaints: knee pain with movement. IV normal saline infusing into the hand left, condition patent and no redness at a rate of KVO mls/ hour with about 800 mls remaining in the bag. The best day to schedule a follow up Dr appointment is:  Monday a.m. Patient also presents with a nerve block, ropivacaine running into the left lateral groin at 6 mL/hr. The patient is interested in [facility]. [facility] to [facility] program?:  No    Explained patients right to have family, representative or physician notified of their admission. Patient has Declined for physician to be notified. Patient has daughter here for family/representative to be notified.

## 2020-01-27 NOTE — FLOWSHEET NOTE
01/26/20 5662   Provider Notification   Reason for Communication Evaluate   Provider Name Mr. Phuong Chacon   Provider Notification Physician Assistant   Method of Communication Call   Response   (ok to start heparin for PE, watch wounds for oozing ice knee)

## 2020-01-27 NOTE — FLOWSHEET NOTE
01/26/20 2030   Provider Notification   Reason for Communication Evaluate   Provider Name dr. Wallace Albert   Provider Notification Physician   Method of Communication Secure Message   Response Other (Comment)  (bmp. urine sodium and urine chloride in AM)   Notification Time 2030

## 2020-01-27 NOTE — FLOWSHEET NOTE
01/27/20 2174   Provider Notification   Reason for Communication Evaluate   Provider Name Dr. Mark Urena   Provider Notification Physician   Method of Communication Secure Message   Response Other (Comment)  (consult for PE)   Handoff   Communication Given Shift Handoff   Oncoming Nurse/Offgoing Nurse [place] Communication Face to Face   Time Handoff Given 0743   End of Shift Check Performed Yes

## 2020-01-27 NOTE — FLOWSHEET NOTE
01/26/20 4985   Provider Notification   Reason for Communication Evaluate   Provider Name Dr. Belinda Gonzalez   Provider Notification Physician   Method of Communication Secure Message   Response Other (Comment)  (contacted due to vq scan results and update)     vq scan showed Indeterminate probability for pulmonary embolism. increased pt. oxygen from 5L to 6L sat 90-92%. current bp 111/56 after 1L bolus of 0.9. Heparin to be started, dosed by pharmacy. Contact dr. Hill aponte to make sure its okay to start heparin iv. Discontinue lovenox . Notified of low urine output. Physician ordered to insert coyle cath. This nurse asked if he would like bladder scan. Physician denied due to GRANT and wanting strict output for critical care setting.  Pulmonology also to be contacted in the AM

## 2020-01-27 NOTE — CONSULTS
**This is a Medical/ PA/ APRN Student Note and is charted for educational purposes. The non-physician staff attested note is not to be used for billing purposes or to guide patient care. Please see the physician modifications/ attestation for treatment plan/suggestions. This note has been reviewed and feedback has been provided to the student. **          [facility] [address]    Patient's Name/Date of Birth: Debra Antony / 1955 (2 y.o.)    Date: January 27, 2020     Referring Provider: Lyndsey Wiseman MD    CHIEF COMPLAINT:      HPI: This is a pleasant 75 y.o. female with a history of paroxysmal atrial fibrillation presented initially with hypotension, hypoxemia, a fib with RVR post TKR. These have since resolved and she has returned to sinus rhythm. EKG was found to demonstrate inferior infarct, anterolateral infarct, and questionable changes in the anterior leads. Troponins trended upwards 0.054-->0.102-->0.122.     2D echo is pending. Cr 2.6. APTT 200.0. V/Q shows intermediate probable PE. Echo: No results found for this or any previous visit. All labs, EKG's, diagnostic testing and images as well as cardiac cath, stress testing were reviewed during this encounter    Past Medical History:   Diagnosis Date    Hyperlipidemia     Hypertension     Hypothyroidism     Obesity (BMI 30-39. 9)     Seizures ([address]     last sz 20+years ago    Vitamin D deficiency      Past Surgical History:   Procedure Laterality Date    APPENDECTOMY  1978    CATARACT REMOVAL  2004   [facility]    JOINT REPLACEMENT  7/6/12    left hip     Current Facility-Administered Medications   Medication Dose Route Frequency Provider Last Rate Last Dose    calcium acetate (PHOSLO) capsule 667 mg  667 mg Oral BID Lyndsey Wiseman MD   667 mg at 01/27/20 0805    levothyroxine (SYNTHROID) tablet 25 mcg  25 mcg Oral Daily Lyndsey Wiseman MD   25 mcg at 01/27/20 0367  sodium chloride flush 0.9 % injection 10 mL  10 mL Intravenous 2 times per day Irvin Fung MD        sodium chloride flush 0.9 % injection 10 mL  10 mL Intravenous PRN Irvin Fung MD        magnesium hydroxide (MILK OF MAGNESIA) 400 MG/5ML suspension 30 mL  30 mL Oral Daily PRN Irvin Fung MD        0.9 % sodium chloride infusion   Intravenous Continuous Irvin Fung  mL/hr at 01/26/20 2312      HYDROcodone-acetaminophen (NORCO) 5-325 MG per tablet 1 tablet  1 tablet Oral Q4H PRN Irvin Fung MD        Or    HYDROcodone-acetaminophen (NORCO) 5-325 MG per tablet 2 tablet  2 tablet Oral Q4H PRN Irvin Fung MD        morphine (PF) injection 2 mg  2 mg Intravenous Q3H PRN Irvin Fnug MD        ondansetron (ZOFRAN) injection 4 mg  4 mg Intravenous Q4H PRN Irvin Fung MD        ipratropium-albuterol (DUONEB) nebulizer solution 1 ampule  1 ampule Inhalation Q4H PRN Irvin Fung MD        levETIRAcetam (KEPPRA) tablet 1,000 mg  1,000 mg Oral BID Irvin Fung MD   1,000 mg at 01/27/20 0805    simvastatin (ZOCOR) tablet 80 mg  80 mg Oral Nightly Irvin Fung MD   80 mg at 01/26/20 2202    heparin (porcine) injection 7,820 Units  80 Units/kg Intravenous PRN Irvin Fung MD        heparin (porcine) injection 3,910 Units  40 Units/kg Intravenous PRN Irvin Fung MD        heparin 25,000 units in dextrose 5% 250 mL infusion  18 Units/kg/hr Intravenous Continuous Irvin Fung MD 13.7 mL/hr at 01/27/20 0806 14 Units/kg/hr at 01/27/20 0806     Prior to Admission medications    Medication Sig Start Date End Date Taking?  Authorizing Provider   simvastatin (ZOCOR) 80 MG tablet Take 80 mg by mouth nightly    Historical Provider, MD   metoprolol succinate (TOPROL XL) 50 MG extended release tablet Take 50 mg by mouth daily    Historical Provider, MD   levETIRAcetam (KEPPRA) 1000 MG tablet Take 1,000 mg by mouth 2 times daily    Historical Provider, MD   aspirin 81 MG EC tablet Take use: No    Sexual activity: Not on file   Lifestyle    Physical activity:     Days per week: Not on file     Minutes per session: Not on file    Stress: Not on file   Relationships    Social connections:     Talks on phone: Not on file     Gets together: Not on file     Attends [employer] service: Not on file     Active member of club or organization: Not on file     Attends meetings of clubs or organizations: Not on file     Relationship status: Not on file    Intimate partner violence:     Fear of current or ex partner: Not on file     Emotionally abused: Not on file     Physically abused: Not on file     Forced sexual activity: Not on file   Other Topics Concern    Not on file   Social History Narrative    Not on file     ROS:   Constitutional: Denies any recent wt change. Eyes:  Denies any blurring or double vision, no glaucoma  Ears/Nose/Mouth/Throat:  Denies any chronic sinus/rhinitis, bleeding gums  Cardiovascular:  As described above. Respiratory:  Denies any frequent cough, wheezing or coughing up blood  Genitourinary:  Denies difficulty with urination and kidney stones  Gastrointestinal:  Denies any chronic problems with abdominal pain, nausea, vomiting or diarrhea  Musculoskeletal:  Denies any joint pain, back pain, or difficulty walking  Integumentary:  Denies any rash  Neurological:  No numbness or tingling  Endocrine:  Denies any polydipsia. Hematologic/Lymphatic:  Denies any hemorrhage or lymphatic drainage problems. Labs:  CBC:   Recent Labs     01/26/20 1919 01/27/20  0056 01/27/20  0558   WBC 12.1* 13.3* 11.9*   HGB 13.1 12.2 12.2   HCT 42.1 39.3 39.8   MCV 92.1 92.7 94.5    205 202     BMP:   Recent Labs     01/26/20 1919 01/27/20  0558    140   K 4.5 5.2    106   CO2 21* 20*   PHOS  --  4.7   BUN 46* 51*   CREATININE 2.1* 2.6*     Accucheck Glucoses: No results for input(s): POCGLU in the last 72 hours.   Cardiac Enzymes:   Recent Labs     01/27/20  0558

## 2020-01-27 NOTE — PLAN OF CARE
Problem: Falls - Risk of:  Goal: Absence of physical injury  Description  Absence of physical injury  Outcome: Met This Shift     Problem: Pain:  Description  Pain management should include both nonpharmacologic and pharmacologic interventions. Goal: Control of chronic pain  Description  Control of chronic pain  Outcome: Met This Shift     Problem: Falls - Risk of:  Goal: Will remain free from falls  Description  Will remain free from falls  Outcome: Ongoing  Note:   Up with 1 assist and walker. Uses call light appropriatley. Also bed alarm placed     Problem: Pain:  Description  Pain management should include both nonpharmacologic and pharmacologic interventions. Goal: Pain level will decrease  Description  Pain level will decrease  Outcome: Ongoing  Note:   Pain Assessment: 0-10  Pain Level: 3   Patient's Stated Pain Goal: 5   Is pain goal met at this time? Yes     Non-Pharmaceutical Pain Intervention(s): Repositioned, Cold applied    Nerve block on left extremity. Cold applied as well   Goal: Control of acute pain  Description  Control of acute pain  Outcome: Ongoing  Note:   Surgical pain controlled. Has nerve block on left extremity     Problem: Activity:  Goal: Fatigue will decrease  Description  Fatigue will decrease  Outcome: Ongoing  Note:   Pt. Desats to 80's with to much movement     Problem: Cardiac:  Goal: Hemodynamic stability will improve  Description  Hemodynamic stability will improve  Outcome: Ongoing  Note:   Pt was in afib with admission, is now currently in sinus rhythm     Problem: Respiratory:  Goal: Ability to maintain adequate ventilation will improve  Description  Ability to maintain adequate ventilation will improve  Outcome: Ongoing  Note:   Pt. On 6L of oxgen.  Pt wears home bipap during the night 10L weaned in     Problem: Skin Integrity:  Goal: Risk for impaired skin integrity will decrease  Description  Risk for impaired skin integrity will decrease  Outcome: Ongoing  Note: Blanchable redness on buttocks has bruising scattered. Problem: Discharge Planning:  Goal: Discharged to appropriate level of care  Description  Discharged to appropriate level of care  Outcome: Ongoing  Note:   Pt to be discharged home alone with family support     Problem: Mobility - Impaired:  Goal: Mobility will improve  Description  Mobility will improve  Outcome: Ongoing  Note:   Up with help. Immobilizer to be on with ambulation      Problem: Infection - Surgical Site:  Goal: Will show no infection signs and symptoms  Description  Will show no infection signs and symptoms  Outcome: Ongoing  Note:   Dr Dawn Lips dressing in place. Ortho to see this AM     Problem: Physical Regulation:  Goal: Prevent transmision of infection  Description  Prevent transmision of infection  Outcome: Ongoing  Note:   Remains in contact precautions for vre and hx of mrsa     Problem: Urinary Elimination:  Goal: Signs and symptoms of infection will decrease  Description  Signs and symptoms of infection will decrease  Outcome: Ongoing  Note:   Urine dark yellow and hazy with insertion will continue to monitor    Care plan reviewed with patient and family. Patient and family verbalize understanding of the plan of care and contribute to goal setting.

## 2020-01-27 NOTE — PROGRESS NOTES
2103 called nurse med tech,   2104 called radiology on call   2151 got approval by Dr. Yoselyn Aguilar to do vq scan. Pt on 6L currently and was on room air at home, short of breath. Oxygen desats with monvement. Unable to do a cta due to elevated creatnine.  Pt. Has a dimer elevated at 1051  2153 nurse med tech contacted  [phone] patient taken down to vq scan   2310 pt back to 4k

## 2020-01-27 NOTE — CONSULTS
[facility]    [address]  [facility]  1/27/2020 8:26 AM    Pt Name:    Debra Antony  MRN:     [id number]   [id number]  YOB: 1955  Admit Date:    1/26/2020  5:28 PM  Primary Care Physician:  Charles Adams MD    CSN Number:   [id number]    Reason for Consult:  Acute kidney injury  Requesting provider:  Dr. Betzy Red  History:   The patient is a 75 y.o. white female with history of hypertension, atrial fibrillation, obstructive sleep apnea, history of excessive nonsteroidal use including ibuprofen and Mobic who has previously had left hip and right knee replacement back in 2011 and 2012. Patient was admitted at [facility] for elective left knee replacement which underwent uneventfully 3 days ago. Postoperatively patient did fine up until yesterday when she started to have shortness of breath with decreased oxygenation associated with hypotensive episodes. Patient apparently flipped into atrial fibrillation with RVR. She was given IV fluid bolus and then she was transferred to [facility] for further evaluation. Nephrology was asked to see her in consultation for evaluation of elevated serum creatinine. Patient follows with her primary doctor in [facility] area. All my information was obtained this morning by reviewing her medical chart and speaking with the patient and the daughter at bedside. Patient does not recall having had any kidney issues in the past.  I do not have her baseline serum creatinine level. However on this admission her serum creatinine level was noted to be 2.1 and today it has gone up to 2.6. Nephrology was asked to see in consultation for management of elevated serum creatinine and GRANT. Patient denies any history of any diabetes. Denies any history of any kidney stones. Denies any gross hematuria.     Past Medical History:  Past Medical History:   Diagnosis Date    Hyperlipidemia     Authorizing Provider   simvastatin (ZOCOR) 80 MG tablet Take 80 mg by mouth nightly    Historical Provider, MD   metoprolol succinate (TOPROL XL) 50 MG extended release tablet Take 50 mg by mouth daily    Historical Provider, MD   levETIRAcetam (KEPPRA) 1000 MG tablet Take 1,000 mg by mouth 2 times daily    Historical Provider, MD   aspirin 81 MG EC tablet Take 81 mg by mouth daily    Historical Provider, MD   ibuprofen (ADVIL;MOTRIN) 200 MG tablet Take 400 mg by mouth every 8 hours as needed     Historical Provider, MD   calcium acetate (PHOSLO) 667 MG capsule Take  by mouth 2 times daily. Historical Provider, MD   phenytoin (DILANTIN) 100 MG ER capsule Take 300 mg by mouth nightly. Historical Provider, MD   vitamin D (CHOLECALCIFEROL) 1000 UNIT TABS tablet Take 1,000 Units by mouth 3 times daily. Historical Provider, MD   levothyroxine (SYNTHROID) 25 MCG tablet Take 25 mcg by mouth Daily. Historical Provider, MD       Review of Systems:  Constitutional: Positive for some nausea, poor oral intake and generalized weakness . Head: Negative for headaches  Eyes: Negative for blurry vision or discharge  Ears: Negative for ear pain or hearing changes  Nose: Negative for runny nose or epistaxis  Respiratory: Positive for shortness of breath. Negative for cough or sputum production. Negative for hemoptysis  Cardiovascular: Negative for chest pain  GI: Positive for nausea. Negative for vomiting and diarrhea.  Negative for hematochezia and melena  : Negative for discharge, dysuria, or hematuria  Skin: Negative for rash  Musculoskeletal: Positive for joint pain, moves all ext  Neuro: Negative for numbness or tingling, negative for slurred speech  Psychiatric: Reports stable mood, negative for depression or insomnia    All other review of systems were reviewed and negative    Current Meds:  Infusion:    sodium chloride 125 mL/hr at 01/26/20 2312    heparin (porcine) 14 Units/kg/hr (01/27/20 0806) rate, S1, S2  Extremities: Left knee dressing intact, no significant LE edema, no tenderness  GI: soft, non-tender, no guarding  Skin: no rash seen on exposed extremities, warm to touch  Musculo: moves all extremities  Neuro: no slurred speech, no facial drooping, symmetric strength  Psychiatric: Normal mood and affect but appears somewhat anxious    Lab Data  CBC:   Recent Labs     01/26/20 1919 01/27/20  0056 01/27/20  0558   WBC 12.1* 13.3* 11.9*   HGB 13.1 12.2 12.2   HCT 42.1 39.3 39.8    205 202     BMP:  Recent Labs     01/26/20 1919 01/27/20  0558    140   K 4.5 5.2    106   CO2 21* 20*   BUN 46* 51*   CREATININE 2.1* 2.6*   GLUCOSE 97 100   CALCIUM 8.4* 8.2*     PTH: @PTH@  TSH:   Recent Labs     01/27/20  0558   TSH 1.140     HgBa1c: No results for input(s): LABA1C in the last 72 hours. Hepatic:   Recent Labs     01/26/20 1919   LABALBU 3.2*   AST 23   ALT 8*   BILITOT 1.1   ALKPHOS 42     Additional Labs: Total , troponin 0 0.122  Diagnostics: Chest x-ray shows cardiomegaly, VQ scan is suggestive of indeterminate probability of pulmonary embolism    Old labs and diagnostics reviewed. Echo: Echo done earlier shows EF of 55 to 60%  Labs: No old basic metabolic panel is available      Impression and Plan:  1. Elevated serum creatinine: Appears to be mostly acute but cannot rule out some element of chronic kidney disease specially in the setting of chronic excessive use of nonsteroidals. But definitely patient has GRANT. Her serum creatinine today has gone up to 2.6 from 2.1 yesterday. This appears to be mostly hemodynamics and prerenal in setting of recent hypotension. Her urine electrolytes are suggestive of prerenal failure but cannot rule out ATN. Her UA shows trace blood with trace protein. I have ordered an ultrasound of the kidneys to rule out any underlying obstructive uropathy. For now continue with IV fluid hydration. No indication for dialysis at this time.   I do anticipate improvement in her renal parameters with IV hydration. Avoid nephrotoxins. Do not give any ACE inhibitors or ARB's. Do not give any nonsteroidals. 2. Acute hypoxemic respiratory failure: Pulmonary is following. They feel it is secondary to pulmonary embolism. Patient is on IV heparin at this time. 3. Status post left knee replacement  4. History of excessive use of nonsteroidal  5. History of hypertension: With relative hypotension, will hold antihypertensives. 6. History of obstructive sleep apnea  7. Medications reviewed. Unclear why patient is on PhosLo. Check phosphorus levels in the morning. Will consider stopping PhosLo in the morning. Thank you for the consult. Please feel free to call me if you have any questions. Case was discussed at length with patient and daughter at bedside. All questions were answered. Nephrology will follow.       Hunter Hernandez MD  [facility]

## 2020-01-28 LAB
ANION GAP SERPL CALCULATED.3IONS-SCNC: 12 MEQ/L (ref 8–16)
APTT: 27 SECONDS (ref 22–38)
APTT: 45.8 SECONDS (ref 22–38)
APTT: 51 SECONDS (ref 22–38)
APTT: 70.8 SECONDS (ref 22–38)
BUN BLDV-MCNC: 45 MG/DL (ref 7–22)
CALCIUM SERPL-MCNC: 8 MG/DL (ref 8.5–10.5)
CHLORIDE BLD-SCNC: 110 MEQ/L (ref 98–111)
CO2: 21 MEQ/L (ref 23–33)
CREAT SERPL-MCNC: 1.4 MG/DL (ref 0.4–1.2)
EKG ATRIAL RATE: 64 BPM
EKG ATRIAL RATE: 82 BPM
EKG ATRIAL RATE: 92 BPM
EKG P AXIS: 31 DEGREES
EKG P AXIS: 60 DEGREES
EKG P-R INTERVAL: 140 MS
EKG P-R INTERVAL: 142 MS
EKG Q-T INTERVAL: 302 MS
EKG Q-T INTERVAL: 356 MS
EKG Q-T INTERVAL: 372 MS
EKG QRS DURATION: 122 MS
EKG QRS DURATION: 126 MS
EKG QRS DURATION: 126 MS
EKG QTC CALCULATION (BAZETT): 434 MS
EKG QTC CALCULATION (BAZETT): 440 MS
EKG QTC CALCULATION (BAZETT): 486 MS
EKG R AXIS: -40 DEGREES
EKG R AXIS: -49 DEGREES
EKG R AXIS: -51 DEGREES
EKG T AXIS: 28 DEGREES
EKG T AXIS: 7 DEGREES
EKG T AXIS: 7 DEGREES
EKG VENTRICULAR RATE: 156 BPM
EKG VENTRICULAR RATE: 82 BPM
EKG VENTRICULAR RATE: 92 BPM
ERYTHROCYTE [DISTWIDTH] IN BLOOD BY AUTOMATED COUNT: 13.6 % (ref 11.5–14.5)
ERYTHROCYTE [DISTWIDTH] IN BLOOD BY AUTOMATED COUNT: 48 FL (ref 35–45)
GFR SERPL CREATININE-BSD FRML MDRD: 38 ML/MIN/1.73M2
GLUCOSE BLD-MCNC: 93 MG/DL (ref 70–108)
HCT VFR BLD CALC: 38.4 % (ref 37–47)
HEMOGLOBIN: 11.7 GM/DL (ref 12–16)
INR BLD: 1.22 (ref 0.85–1.13)
MCH RBC QN AUTO: 29.1 PG (ref 26–33)
MCHC RBC AUTO-ENTMCNC: 30.5 GM/DL (ref 32.2–35.5)
MCV RBC AUTO: 95.5 FL (ref 81–99)
MRSA SCREEN: NORMAL
PLATELET # BLD: 192 THOU/MM3 (ref 130–400)
PMV BLD AUTO: 10.4 FL (ref 9.4–12.4)
POTASSIUM SERPL-SCNC: 4.3 MEQ/L (ref 3.5–5.2)
RBC # BLD: 4.02 MILL/MM3 (ref 4.2–5.4)
SODIUM BLD-SCNC: 143 MEQ/L (ref 135–145)
WBC # BLD: 9.4 THOU/MM3 (ref 4.8–10.8)

## 2020-01-28 PROCEDURE — 6370000000 HC RX 637 (ALT 250 FOR IP): Performed by: INTERNAL MEDICINE

## 2020-01-28 PROCEDURE — 2500000003 HC RX 250 WO HCPCS: Performed by: PHYSICIAN ASSISTANT

## 2020-01-28 PROCEDURE — 2580000003 HC RX 258: Performed by: INTERNAL MEDICINE

## 2020-01-28 PROCEDURE — 93010 ELECTROCARDIOGRAM REPORT: CPT | Performed by: NUCLEAR MEDICINE

## 2020-01-28 PROCEDURE — 2500000003 HC RX 250 WO HCPCS: Performed by: INTERNAL MEDICINE

## 2020-01-28 PROCEDURE — 85730 THROMBOPLASTIN TIME PARTIAL: CPT

## 2020-01-28 PROCEDURE — 85610 PROTHROMBIN TIME: CPT

## 2020-01-28 PROCEDURE — 6360000002 HC RX W HCPCS: Performed by: INTERNAL MEDICINE

## 2020-01-28 PROCEDURE — 36415 COLL VENOUS BLD VENIPUNCTURE: CPT

## 2020-01-28 PROCEDURE — 99232 SBSQ HOSP IP/OBS MODERATE 35: CPT | Performed by: NURSE PRACTITIONER

## 2020-01-28 PROCEDURE — 99233 SBSQ HOSP IP/OBS HIGH 50: CPT | Performed by: INTERNAL MEDICINE

## 2020-01-28 PROCEDURE — 99232 SBSQ HOSP IP/OBS MODERATE 35: CPT | Performed by: INTERNAL MEDICINE

## 2020-01-28 PROCEDURE — 2709999900 HC NON-CHARGEABLE SUPPLY

## 2020-01-28 PROCEDURE — 2060000000 HC ICU INTERMEDIATE R&B

## 2020-01-28 PROCEDURE — 80048 BASIC METABOLIC PNL TOTAL CA: CPT

## 2020-01-28 PROCEDURE — 93005 ELECTROCARDIOGRAM TRACING: CPT | Performed by: INTERNAL MEDICINE

## 2020-01-28 PROCEDURE — 6370000000 HC RX 637 (ALT 250 FOR IP): Performed by: NURSE PRACTITIONER

## 2020-01-28 PROCEDURE — 6370000000 HC RX 637 (ALT 250 FOR IP): Performed by: PHYSICIAN ASSISTANT

## 2020-01-28 PROCEDURE — 85027 COMPLETE CBC AUTOMATED: CPT

## 2020-01-28 RX ORDER — FUROSEMIDE 20 MG/1
20 TABLET ORAL 2 TIMES DAILY
Status: DISCONTINUED | OUTPATIENT
Start: 2020-01-28 | End: 2020-01-28

## 2020-01-28 RX ORDER — METOPROLOL TARTRATE 5 MG/5ML
5 INJECTION INTRAVENOUS ONCE
Status: COMPLETED | OUTPATIENT
Start: 2020-01-28 | End: 2020-01-28

## 2020-01-28 RX ORDER — WARFARIN SODIUM 3 MG/1
3 TABLET ORAL ONCE
Status: COMPLETED | OUTPATIENT
Start: 2020-01-28 | End: 2020-01-28

## 2020-01-28 RX ORDER — DILTIAZEM HYDROCHLORIDE 5 MG/ML
10 INJECTION INTRAVENOUS ONCE
Status: COMPLETED | OUTPATIENT
Start: 2020-01-28 | End: 2020-01-28

## 2020-01-28 RX ORDER — METOPROLOL SUCCINATE 50 MG/1
50 TABLET, EXTENDED RELEASE ORAL DAILY
Status: DISCONTINUED | OUTPATIENT
Start: 2020-01-28 | End: 2020-01-31

## 2020-01-28 RX ORDER — DILTIAZEM HYDROCHLORIDE 120 MG/1
120 CAPSULE, COATED, EXTENDED RELEASE ORAL DAILY
Status: DISCONTINUED | OUTPATIENT
Start: 2020-01-28 | End: 2020-01-28

## 2020-01-28 RX ORDER — FUROSEMIDE 20 MG/1
20 TABLET ORAL 2 TIMES DAILY
Status: DISPENSED | OUTPATIENT
Start: 2020-01-28 | End: 2020-01-29

## 2020-01-28 RX ADMIN — LEVETIRACETAM 1000 MG: 500 TABLET, FILM COATED ORAL at 08:11

## 2020-01-28 RX ADMIN — HYDROCODONE BITARTRATE AND ACETAMINOPHEN 1 TABLET: 5; 325 TABLET ORAL at 21:07

## 2020-01-28 RX ADMIN — METOPROLOL TARTRATE 5 MG: 5 INJECTION INTRAVENOUS at 09:24

## 2020-01-28 RX ADMIN — LOPERAMIDE HYDROCHLORIDE 2 MG: 2 CAPSULE ORAL at 08:11

## 2020-01-28 RX ADMIN — HEPARIN SODIUM 3910 UNITS: 1000 INJECTION INTRAVENOUS; SUBCUTANEOUS at 01:59

## 2020-01-28 RX ADMIN — SODIUM CHLORIDE: 9 INJECTION, SOLUTION INTRAVENOUS at 05:11

## 2020-01-28 RX ADMIN — CALCIUM ACETATE 667 MG: 667 CAPSULE ORAL at 21:06

## 2020-01-28 RX ADMIN — WARFARIN SODIUM 3 MG: 3 TABLET ORAL at 21:14

## 2020-01-28 RX ADMIN — DILTIAZEM HYDROCHLORIDE 120 MG: 120 CAPSULE, COATED, EXTENDED RELEASE ORAL at 09:49

## 2020-01-28 RX ADMIN — CALCIUM ACETATE 667 MG: 667 CAPSULE ORAL at 08:12

## 2020-01-28 RX ADMIN — DILTIAZEM HYDROCHLORIDE 10 MG: 5 INJECTION INTRAVENOUS at 11:03

## 2020-01-28 RX ADMIN — DILTIAZEM HYDROCHLORIDE 5 MG/HR: 5 INJECTION INTRAVENOUS at 11:32

## 2020-01-28 RX ADMIN — LEVOTHYROXINE SODIUM 25 MCG: 25 TABLET ORAL at 06:23

## 2020-01-28 RX ADMIN — LEVETIRACETAM 1000 MG: 500 TABLET, FILM COATED ORAL at 21:06

## 2020-01-28 RX ADMIN — SIMVASTATIN 80 MG: 40 TABLET, FILM COATED ORAL at 21:06

## 2020-01-28 RX ADMIN — FUROSEMIDE 20 MG: 20 TABLET ORAL at 22:15

## 2020-01-28 ASSESSMENT — PAIN SCALES - GENERAL
PAINLEVEL_OUTOF10: 2
PAINLEVEL_OUTOF10: 0
PAINLEVEL_OUTOF10: 4

## 2020-01-28 NOTE — CONSULTS
[address] [place] [address]                                  CONSULTATION    PATIENT NAME: Shelby Lee                     :        1955  MED REC NO:   [id number]                           ROOM:       0024  ACCOUNT NO:   [de-identified]                           ADMIT DATE: 2020  PROVIDER:     BAL Vargas CONSULT DATE:  2020    REASON FOR CONSULTATION:  Left total knee replacement. PAST MEDICAL HISTORY:  Hyperlipidemia, hypertension, hypothyroidism,  obesity, seizures, vitamin D deficiency, atrial fibrillation with RVR. PAST SURGICAL HISTORY:  Includes left total knee replacement, left hip  replacement, cholecystectomy, , appendectomy. HOME MEDICATIONS:  Zocor, metoprolol succinate, Keppra, aspirin,  ibuprofen, calcium acetate, Dilantin and vitamin D. ALLERGIES:  LATEX and PENICILLIN. SOCIAL HISTORY:  Denies tobacco or alcohol use. FAMILY HISTORY:  Arthritis in father and mother. REVIEW OF SYSTEMS:  Complains of some diarrhea, abdominal pain, left  knee pain. Denies headaches or seizures. HISTORY OF PRESENT ILLNESS:  The patient is a pleasant 64-year-old  female who had a left total knee replacement done at [facility] by Dr. Vera Nuñez, on Friday. She went on to do fairly well, but unfortunately  , she was developing some abdominal pain with some diarrhea. Initially, thought it was more gastrointestinal issues, but they did go  ahead and got some vital signs and she certainly became hypoxemic as  well as hypotensive and flipped into AFib with RVR with her heart rate  in the 150s. Dr. Aden Dumont was consulted who ended up having her transferred  over to [address] [place] [address] for further workup. We are on board  to follow up of her left knee. Currently, she is bed rest.  She is on  heparin drip.   Her creatinine was too high in order to get a CTA, so  they did a perfusion scan which was suggestive of pulmonary embolism. PHYSICAL EXAMINATION:  GENERAL:  Shows a 49-year-old female in no apparent distress. Alert and  oriented x3. HEENT:  Normocephalic and atraumatic. HEART:  Regular rate and rhythm. LUNGS:  Diminished. ABDOMEN:  Soft. EXTREMITIES:  Left knee shows a well-healing incision. No signs of any  drainage. She does have some swelling noted. ZipLine is intact. NEUROLOGICAL:  She is neurologically intact. VASCULAR:  Intact. ASSESSMENT:  Left knee replacement. PLAN:  At this time, we will go ahead and continue to follow her. We  are going to go ahead and get a CPM machine. Start her on 0 to 60  degrees, advance as tolerates, 10 to 12 hours a day. She would like to  have some Imodium for her diarrhea. We are going to order that. We  will continue to follow her along. Appreciate all input from other specialties         BAL Mahajan     D: 01/27/2020 16:57:24       T: 01/27/2020 20:03:28     [email or web address]  Job#: [id number]     Doc#: [id number]    CC:

## 2020-01-28 NOTE — PROGRESS NOTES
Clinical Pharmacy Note    Napoleon Phelan is a 75 y.o. female for whom pharmacy has been asked to manage warfarin therapy. Reason for Admission: acute respiratory failure    Consulting Physician: Dr. Man Tuttle  Warfarin dose prior to admission: none PTA  Warfarin indication: afib, probable PE  Target INR range: 2-3   Outpatient warfarin provider: [facility]    Past Medical History:   Diagnosis Date    Hyperlipidemia     Hypertension     Hypothyroidism     Obesity (BMI 30-39. 9)     Seizures ([address]     last sz 20+years ago    Vitamin D deficiency                 Recent Labs     01/28/20  1428   INR 1.22*     Recent Labs     01/27/20  0056 01/27/20  0558 01/28/20  0742   HGB 12.2 12.2 11.7*   HCT 39.3 39.8 38.4    202 192       Current warfarin drug-drug interactions: heparin gtt, levothyroxine (HM), simvastatin (HM)      Date INR Warfarin Dose   1/28/2020 1.22 3 mg                                   Daily PT/INR until stable within therapeutic range. Thank you for the consult.      Deacon Araiza, PharmD, BCPS   1/28/2020  3:34 PM

## 2020-01-28 NOTE — PLAN OF CARE
remained in bed this shift. Fall precautions in place for her safety. Nurse checks on the patient every hour. Care plan reviewed with patient. Patient verbalize understanding of the plan of care and contribute to goal setting.

## 2020-01-28 NOTE — PROGRESS NOTES
Cardiology Progress Note      Patient:  Janine Pinot  YOB: 1955  MRN: [id number]   Acct: [de-identified]  Admit Date:  1/26/2020  Primary Cardiologist: Dr. Cedeno Care  Seen by Dr. Tyson Yen    Per prior cardiology consult note-  CHIEF COMPLAINT: afib RVR        HPI: This is a pleasant 75 y.o. female presents with left TKR done 3 days prior. She was doing well post-op and flipped into Afib RVR with hypotension and hypoxemia into the 150s. IV fluids given. Transferred to [facility]. No prior cardiac history, but does admit to [facility]. No DVT on duplex. V/q scan is indeterminate probability. Cr 2.6, Trop 0.102--0.122, TSH 1.1, Hgb 12. ECG now NSR RBBB LAD, inferior infarct. She has not taken [employer]   Currently, no chest pain, angina, ELLISON, orthopnea, PND, sob at rest, palpitations, LE edema, or syncope      Subjective (Events in last 24 hours):     Pt sitting in chair   No chest pain o dizziness or palpitations   Does c/o SOB     Went into AFB with RVR this am when got OOB - 's -- has received IV lopressor - and CDZ CD 120mg po x1 -- still AFB -140  BP stable   BB never restarted after surgery     Daughter at bedside       Objective:   /65   Pulse 132   Temp 98.4 °F (36.9 °C) (Oral)   Resp 20   Ht 4' 11\" (1.499 m)   Wt 216 lb 12.8 oz (98.3 kg)   SpO2 94%   BMI 43.79 kg/m²        TELEMETRY: AFB -140    Physical Exam:  General Appearance: alert and oriented to person, place and time, in no acute distress  Cardiovascular: irregular rhythm, normal S1 and S2, no murmurs, rubs, clicks, or gallops, distal pulses intact,   Pulmonary/Chest: diminished to auscultation bilaterally- no wheezes, rales or rhonchi, normal air movement, no respiratory distress  Abdomen: soft, non-tender, non-distended, normal bowel sounds, no masses Extremities: no cyanosis, clubbing or edema, pulses present    Skin: warm and dry, no rash or erythema  Head: normocephalic and atraumatic  Musculoskeletal: normal range of motion, no joint swelling, deformity or tenderness  Neurological: alert, oriented, normal speech, no focal findings or movement disorder noted    Medications:    [Held by provider] diltiazem  120 mg Oral Daily    metoprolol succinate  50 mg Oral Daily    calcium acetate  667 mg Oral BID    levothyroxine  25 mcg Oral Daily    sodium chloride flush  10 mL Intravenous 2 times per day    levETIRAcetam  1,000 mg Oral BID    simvastatin  80 mg Oral Nightly      diltiazem (CARDIZEM) 125 mg in dextrose 5% 125 mL infusion 5 mg/hr (20 1132)    sodium chloride 100 mL/hr at 20 1132    heparin (porcine) 9 Units/kg/hr (20 0958)     loperamide, 2 mg, TID PRN  sodium chloride flush, 10 mL, PRN  magnesium hydroxide, 30 mL, Daily PRN  HYDROcodone 5 mg - acetaminophen, 1 tablet, Q4H PRN    Or  HYDROcodone 5 mg - acetaminophen, 2 tablet, Q4H PRN  morphine, 2 mg, Q3H PRN  ondansetron, 4 mg, Q4H PRN  ipratropium-albuterol, 1 ampule, Q4H PRN  heparin (porcine), 80 Units/kg, PRN  heparin (porcine), 40 Units/kg, PRN        Diagnostics:  EK2020 08:39:00 [address] ROUTINE RETRIEVAL  Atrial fibrillation with rapid ventricular response  Left axis deviation  Right bundle branch block  Inferior infarct (cited on or before 2020)  T wave abnormality, consider lateral ischemia  Abnormal ECG  When compared with ECG of 2020 18:40,  Significant changes have occurred  Confirmed by DESIRAE ZUNIGA ([id number]) on 2020 9:38:15 AM      2020 18:40:05 [address] ROUTINE RETRIEVAL  Normal sinus rhythm  Left axis deviation  Right bundle branch block  Inferior infarct (cited on or before 2020)  Anterolateral infarct (cited on or before 2020)  Abnormal ECG  When compared with ECG of 2020 18:12,  Sinus rhythm has replaced Atrial fibrillation  Questionable change in initial forces of Anterior leads  Confirmed by Mariann Shay ([id number]) on 2020 8:17:08 AM    Echo:   Electronically signed by Luc White MD (Interpreting   physician) on 01/27/2020 at 05:03 PM   ----------------------------------------------------------------      Findings      Mitral Valve   The mitral valve structure was normal with normal leaflet separation. DOPPLER: The transmitral velocity was within the normal range with no   evidence for mitral stenosis. There was no evidence of mitral   regurgitation. Aortic Valve   The aortic valve was trileaflet with normal thickness and cuspal   separation. DOPPLER: Transaortic velocity was within the normal range with   no evidence of aortic stenosis. There was no evidence of aortic   regurgitation. Tricuspid Valve   The tricuspid valve structure was normal with normal leaflet separation. DOPPLER: There was no evidence of tricuspid stenosis. There was trace   tricuspid regurgitation. Assuming RAP = 5 mmHg, the estimated RVSP = 49   mmHg. Pulmonic Valve   The pulmonic valve leaflets were not well seen. DOPPLER: The transpulmonic   velocity was within the normal range with no evidence for regurgitation. Left Atrium   Left atrial size was normal.      Left Ventricle   Normal left ventricular size and systolic function. There were no regional wall motion abnormalities. Wall thickness was within normal limits. Ejection fraction was estimated at 55-60%. Right Atrium   Right atrial size was normal.      Right Ventricle   The right ventricular size was normal with normal systolic function and   wall thickness. Pericardial Effusion   The pericardium was normal in appearance with no evidence of a pericardial   effusion. Pleural Effusion   No evidence of pleural effusion. Aorta / Great Vessels   -Ascending aorta = 2.6 cm. -IVC not welll seen. Stress 10/6/2017   Dr Heidi Mclean office  IMPRESSION  IMPRESSION:  1. No evidence of stress induced reversible perfusion defects to suggest  ischemia.   2. Ejection fraction of 69%  3. No focal wall motion abnormality.       Left Heart Cath: never    Lab Data:    Cardiac Enzymes:  Recent Labs     01/27/20  0558   CKTOTAL 197*       CBC:   Lab Results   Component Value Date    WBC 9.4 01/28/2020    RBC 4.02 01/28/2020    HGB 11.7 01/28/2020    HCT 38.4 01/28/2020     01/28/2020       CMP:    Lab Results   Component Value Date     01/28/2020    K 4.3 01/28/2020    K 4.5 01/26/2020     01/28/2020    CO2 21 01/28/2020    BUN 45 01/28/2020    CREATININE 1.4 01/28/2020    LABGLOM 38 01/28/2020    GLUCOSE 93 01/28/2020    CALCIUM 8.0 01/28/2020       Hepatic Function Panel:    Lab Results   Component Value Date    ALKPHOS 42 01/26/2020    ALT 8 01/26/2020    AST 23 01/26/2020    PROT 6.2 01/26/2020    BILITOT 1.1 01/26/2020    LABALBU 3.2 01/26/2020       Magnesium:  No results found for: MG    PT/INR:  No results found for: PROTIME, INR    HgBA1c:  No results found for: LABA1C    FLP:  No results found for: TRIG, HDL, LDLCALC, LDLDIRECT, LABVLDL    TSH:    Lab Results   Component Value Date    TSH 1.140 01/27/2020         Assessment:    S\p TKR 1/24/2020    indeterminate VQ scan for PE    PAFB RVR -- AFB RVR   [id number]=  3   Not on [address] as OP DT working       GRANT-- resolving     Elevated trop-- secondary to surgery, GRANT and AFB RVR    HTN  Hx TONEY      Plan:  · Continue with CDZ GTT - if BP drops - stop GTT and call   · Restart home BB  · eliquis or xarelto as [address] for PAFB   · Decrease IVF - may need diuresis with AFB RVR         Electronically signed by SOCO Dumont - CNP on 1/28/2020 at 11:41 AM

## 2020-01-28 NOTE — PROGRESS NOTES
[facility]      Patient - Mari Long   MRN -  [id number]   Acct # - [de-identified]   - 1955      Date of Admission -  2020  5:28 PM  Date of evaluation -  2020  Room - Rickey Duque MD Primary Care Physician - Jocelynn Griffiths MD     Problem List      Active Hospital Problems    Diagnosis Date Noted    Acute respiratory failure with hypoxemia [facility]) [J96.01] 2020     Reason for Consult    Pulmonary embolism, increased oxygen needs  HPI   History Obtained From: Patient, patient's daughter, and electronic medical record. Mari Long is a 75 y.o. female with a PMHx of Obesity, hypothyroidism, seizures, HLD, HTN, and S/P left total knee replacement on 20 at [facility] by [address]. Pulmonology was consulted for sudden onset hypoxia and shortness of breath that started suddenly on POD 2. Pt. Reports that she was resting in bed when she suddenly felt SOB. She denies chest pain, reports some cough that is not bloody or productive. Due to her creatinine she did not receive a CTA. V/Q scan shows indeterminate probability for pulmonary embolism and recommend further evaluation with chest CTA 2020. She was found to be in atrial fibrillation at this time. She has a history of afib that is being rate controlled on metoprolol without anticoagulation. Past 24:  Patient has been weaned down to 4 LPM O2 via NC. She reports continued SOB periodically, and with activity that are relieved with rest. Per nursing, she desaturates with activity into the high 80's. She reports right sided stabbing rib pain that starts at about the mid axillary line and goes forward along the rib. It is worse with coughing and certain body positions. The pain started on  after her coughing and retching. She denies any other chest pain. Denies cough, calf tenderness, reports abdominal pain.  Denies constipation, or diarrhea. She feels that the pain in her Left leg is being adequately controlled and that she would like to do inpatient rehab at the hospital prior to going home. She is looking forward to physical therapy tomorrow and being more active. She reports using her spirometer ten times every hour when she is feeling well. In the AM she had an episode of Afib with RVR, cardiology saw her and restarted her home Beta Blocker and placed her on a cardizem drip. Her Creatinine is 1.4 today, down from a max of 2.6. She is having shortness of breath: Yes  Onset: rapid   Duration:2 day. Diurnal variation:  None. Functional status prior to beginning of symptoms: limited due to knee that she just had replaced   She denies orthopnea. She denies paroxysmal nocturnal dyspnea. She is having cough: Yes  Duration of cough: for 2 days. Her cough is associated with sputum production: No   Hemoptysis:No  Diurnal variation: None. Relieving factors: Yes, oxygen  Aggravating factors: Yes, activity      She is having chest pain:No    PMHx   Past Medical History      Diagnosis Date    Hyperlipidemia     Hypertension     Hypothyroidism     Obesity (BMI 30-39. 9)     Seizures ([address]     last sz 20+years ago    Vitamin D deficiency       Past Surgical History        Procedure Laterality Date    APPENDECTOMY      CATARACT REMOVAL       SECTION      CHOLECYSTECTOMY      JOINT REPLACEMENT  12    left hip     Meds    Current Medications    calcium acetate  667 mg Oral BID    levothyroxine  25 mcg Oral Daily    sodium chloride flush  10 mL Intravenous 2 times per day    levETIRAcetam  1,000 mg Oral BID    simvastatin  80 mg Oral Nightly     loperamide, sodium chloride flush, magnesium hydroxide, HYDROcodone 5 mg - acetaminophen **OR** HYDROcodone 5 mg - acetaminophen, morphine, ondansetron, ipratropium-albuterol, heparin (porcine), heparin (porcine)  IV Drips/Infusions   sodium chloride 125 mL/hr at 01/28/20 0511    heparin (porcine) 9 Units/kg/hr (01/28/20 0159)     Home Medications  Medications Prior to Admission: simvastatin (ZOCOR) 80 MG tablet, Take 80 mg by mouth nightly  metoprolol succinate (TOPROL XL) 50 MG extended release tablet, Take 50 mg by mouth daily  levETIRAcetam (KEPPRA) 1000 MG tablet, Take 1,000 mg by mouth 2 times daily  aspirin 81 MG EC tablet, Take 81 mg by mouth daily  ibuprofen (ADVIL;MOTRIN) 200 MG tablet, Take 400 mg by mouth every 8 hours as needed   calcium acetate (PHOSLO) 667 MG capsule, Take  by mouth 2 times daily. phenytoin (DILANTIN) 100 MG ER capsule, Take 300 mg by mouth nightly. vitamin D (CHOLECALCIFEROL) 1000 UNIT TABS tablet, Take 1,000 Units by mouth 3 times daily. levothyroxine (SYNTHROID) 25 MCG tablet, Take 25 mcg by mouth Daily. [DISCONTINUED] rosuvastatin (CRESTOR) 20 MG tablet, Take  by mouth. 1/2 tablet nightly   [DISCONTINUED] phenobarbital (LUMINAL) 97.2 MG tablet, Take 97.2 mg by mouth daily. Diet    DIET GENERAL;  Allergies    Latex and Pcn [penicillins]  Social History     Social History     Socioeconomic History    Marital status:       Spouse name: Not on file    Number of children: Not on file    Years of education: Not on file    Highest education level: Not on file   Occupational History    Not on file   Social Needs    Financial resource strain: Not on file    Food insecurity:     Worry: Not on file     Inability: Not on file    Transportation needs:     Medical: Not on file     Non-medical: Not on file   Tobacco Use    Smoking status: Never Smoker    Smokeless tobacco: Never Used   Substance and Sexual Activity    Alcohol use: No    Drug use: No    Sexual activity: Not on file   Lifestyle    Physical activity:     Days per week: Not on file     Minutes per session: Not on file    Stress: Not on file   Relationships    Social connections:     Talks on phone: Not on file     Gets together: Not on file Attends Orthodox service: Not on file     Active member of club or organization: Not on file     Attends meetings of clubs or organizations: Not on file     Relationship status: Not on file    Intimate partner violence:     Fear of current or ex partner: Not on file     Emotionally abused: Not on file     Physically abused: Not on file     Forced sexual activity: Not on file   Other Topics Concern    Not on file   Social History Narrative    Not on file     Family History          Problem Relation Age of Onset    Arthritis Father     Arthritis Mother     Diabetes Brother      Sleep History    Has sleep apnea, uses a CPAP machine at night. She sees someone from [facility] who manages it. We have requested those records. 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 History of tobacco smoking:No  .  History of recreational or IV drug use in the past:NO     History of exposure to coal mines/coal dust: NO  History of exposure to foundry dust/welding: NO  History of exposure to quarry/silica/sandblasting: NO  History of exposure to asbestos/working with breaks/ships: NO  History of exposure to farm dust: NO  History of recent travel to long distances: NO    History of pulmonary embolism in the past: No            History of DVT in the past:No            Riview of systems   General/Constitutional: No recent loss of weight or appetite changes. No fever or chills. HENT: Negative. Eyes: Negative. Upper respiratory tract: No nasal stuffiness or post nasal drip. Lower respiratory tract/ lungs: See HPI for details. No hemoptysis. Cardiovascular: No palpitations or chest pain. Gastrointestinal: Reports some nausea and diarrhea over the weekend, denies vomiting. Neurological: No focal neurologiacal weakness. Extremities: No edema. Musculoskeletal: No complaints. Genitourinary: No complaints.   Hematological: Negative. Psychiatric/Behavioral: Negative. Skin: No itching. Vitals     height is 4' 11\" (1.499 m) and weight is 216 lb 12.8 oz (98.3 kg). Her oral temperature is 98.4 °F (36.9 °C). Her blood pressure is 144/68 (abnormal) and her pulse is 89. Her respiration is 18 and oxygen saturation is 94%. Body mass index is 43.79 kg/m². SUPPLEMENTAL O2: O2 Flow Rate (L/min): 4 L/min     I/O        Intake/Output Summary (Last 24 hours) at 1/28/2020 0901  Last data filed at 1/28/2020 0301  Gross per 24 hour   Intake 4243.37 ml   Output 2500 ml   Net 1743.37 ml     I/O last 3 completed shifts: In: 4603.4 [P.O.:1490; I.V.:3113.4]  Out: 2500 [Urine:2500]   Patient Vitals for the past 96 hrs (Last 3 readings):   Weight   01/28/20 0300 216 lb 12.8 oz (98.3 kg)   01/27/20 0340 220 lb 8 oz (100 kg)   01/26/20 1945 215 lb 8 oz (97.8 kg)       Exam   General Appearance: Patient appears moderately built and well nourished in no acute distress  HEENT: Normal, Head is normocephalic, atraumatic. Oropharynx is clear and moist.  No oral thrush. PERRLA  Neck - Supple, No JVD present. No tracheal deviation present. Lungs - Bilateral air entry present. Bilateral aeration good. no wheezes, rales or rhonchi,   Cardiovascular - Irregularly irregular rhythm, with rate fluctuating in the 110's. Abdomen - Tenderness in RLQ, soft, nondistended, no masses or organomegaly  Neurologic - Awake, alert, oriented. There are no focal motor or sensory deficits  Extremities - Left leg is wrapped, pt had total knee replacement on Friday. No cyanosis, clubbing or edema. Musculoskeletal: Normal range of motion. Patient exhibits no tenderness. Lymphadenopathy:  No cervical adenopathy. Psychiatric: Patient  has a normal mood and affect. Skin - No bruising or bleeding.     Labs  - Old records and notes have been reviewed in [facility]   ABG  Lab Results   Component Value Date    PH 7.36 01/26/2020    PO2 64 01/26/2020    PCO2 36 01/26/2020    HCO3 20 01/26/2020    O2SAT 91 01/26/2020     Lab Results   Component Value Date    IFIO2 5 01/26/2020     CBC  Recent Labs     01/27/20  0056 01/27/20  0558 01/28/20  0742   WBC 13.3* 11.9* 9.4   RBC 4.24 4.21 4.02*   HGB 12.2 12.2 11.7*   HCT 39.3 39.8 38.4   MCV 92.7 94.5 95.5   MCH 28.8 29.0 29.1   MCHC 31.0* 30.7* 30.5*    202 192   MPV 10.0 10.4 10.4      BMP  Recent Labs     01/26/20  1919 01/27/20  0558 01/28/20  0742    140 143   K 4.5 5.2 4.3    106 110   CO2 21* 20* 21*   BUN 46* 51* 45*   CREATININE 2.1* 2.6* 1.4*   GLUCOSE 97 100 93   PHOS  --  4.7  --    CALCIUM 8.4* 8.2* 8.0*     LFT  Recent Labs     01/26/20 1919   AST 23   ALT 8*   BILITOT 1.1   ALKPHOS 42     TROP  Lab Results   Component Value Date    TROPONINT 0.122 01/27/2020    TROPONINT 0.102 01/27/2020    TROPONINT 0.054 01/26/2020     BNP  No results for input(s): BNP in the last 72 hours. Lactic Acid  Recent Labs     01/26/20  1919 01/27/20  0056   LACTA 2.8* 2.7*     INR  No results for input(s): INR, PROTIME in the last 72 hours. PTT  Recent Labs     01/27/20  1813 01/28/20  0054 01/28/20  0742   APTT 100.0* 45.8* 70.8*     Glucose  No results for input(s): POCGLU in the last 72 hours. UA   Recent Labs     01/26/20  2330   SPECGRAV >1.030*   PHUR 5.0   COLORU DK YELLOW*   PROTEINU 100*   BLOODU TRACE*   RBCUA 3-5   WBCUA 2-4   BACTERIA FEW   NITRU NEGATIVE   BILIRUBINUR SMALL*   UROBILINOGEN 1.0   KETUA 15*   LABCAST 0-4 HYALINE  NONE SEEN   . Sleep studies   Conducted in [facility]. Request has been placed for those results. EKG   EKG 1/28/2020 Atrial fibrillation with rapid ventricular response, Left axis deviation  Right bundle branch block, Inferior infarct (cited on or before 26-JAN-2020), T wave abnormality, consider lateral ischemia      Echocardiogram   Transthoracic Echocardiography Report (TTE)   01/27/2020   Summary   Technically difficult study. Ejection fraction was estimated at 55-60%.    There was down from 2.6 on 1/27, with a baseline of 0.9. Pt. Was given fluids, not a candidate for CTA chest or CT with contrast until GRANT is resolved. Management per primary and nephrology. Case discussed with nurse and patient/family. Questions and concerns addressed. Electronically signed by   Jeff Llamas on 1/28/2020 at 9:01 AM     Addendum by Dr. Jemma Thurston MD:  I have seen and examined the patient independently. Face to face evaluation and examination was performed. The above evaluation and note has been reviewed. Labs and radiographs were reviewed. I Have discussed with Dr. Jeff Llamas, Medical student about this patient in detail. The above assessment and plan has been reviewed. Please see my modifications mentioned below. My modifications:  Improving hypoxia. Pulmonary hypertension noted on her recent echo- due to pulmonary embolism Vs left heart disease Vs TONEY  Follow serum creatinine in Am. Will consider for CTA of chest with contrast once her serum creatinine is with in normal limits. Afib with RVR- needs anticoagulation per cardiology.     Javed Talavera MD 1/28/2020 5:51 PM

## 2020-01-28 NOTE — FLOWSHEET NOTE
01/28/20 0843   Provider Notification   Reason for Communication Review case   Provider Name Haley Chacko   Provider Notification Physician Assistant   Method of Communication Secure Message   Response Waiting for response   Notification Time [id number] - Patient tachycardic after getting up to chair this morning. 12 lead EKG showing Afib RVR, please advise. 9803 - See orders.

## 2020-01-28 NOTE — PROGRESS NOTES
INTERNAL MEDICINE Progress Note  1/27/2020 7:13 PM  Subjective:   Admit Date: 1/26/2020  PCP: Monik Sharma MD  Interval History:   SOB, no cough, no CP  PT started    Objective:   Vitals: BP (!) 104/51   Pulse 85   Temp 98.7 °F (37.1 °C) (Oral)   Resp 18   Ht 4' 11\" (1.499 m)   Wt 220 lb 8 oz (100 kg)   SpO2 90%   BMI 44.54 kg/m²   General appearance: alert and cooperative with exam  HEENT: Head: atraumatic  Neck: no adenopathy, no carotid bruit and no JVD  Lungs: diminished breath sounds bilaterally  Heart: S1, S2 normal  Abdomen: soft, non-tender; bowel sounds normal; no masses,  no organomegaly  Extremities: dressing left knee  Neurologic: Mental status: Alert, oriented, thought content appropriate      Medications:   Scheduled Meds:   calcium acetate  667 mg Oral BID    levothyroxine  25 mcg Oral Daily    sodium chloride flush  10 mL Intravenous 2 times per day    levETIRAcetam  1,000 mg Oral BID    simvastatin  80 mg Oral Nightly     Continuous Infusions:   sodium chloride 125 mL/hr at 01/27/20 1553    heparin (porcine) 6 Units/kg/hr (01/27/20 1838)       Lab Results:   CBC:   Recent Labs     01/26/20 1919 01/27/20  0056 01/27/20  0558   WBC 12.1* 13.3* 11.9*   HGB 13.1 12.2 12.2    205 202     BMP:    Recent Labs     01/26/20 1919 01/27/20  0558    140   K 4.5 5.2    106   CO2 21* 20*   BUN 46* 51*   CREATININE 2.1* 2.6*   GLUCOSE 97 100     Hepatic:   Recent Labs     01/26/20 1919   AST 23   ALT 8*   BILITOT 1.1   ALKPHOS 42     TSH:    Lab Results   Component Value Date    TSH 1.140 01/27/2020     TTE   Technically difficult study.   Ejection fraction was estimated at 55-60%.   There was trace tricuspid regurgitation.   Assuming RAP = 5 mmHg, the estimated RVSP = 49 mmHg.   Ascending aorta = 2.6 cm.  IVC not welll seen.     Renal USS  Minimal hydronephrosis bilaterally. There is also cortical thinning bilaterally.  Nonspecific complex focus left kidney    VL DUP LOWER

## 2020-01-29 ENCOUNTER — APPOINTMENT (OUTPATIENT)
Dept: CT IMAGING | Age: 65
DRG: 189 | End: 2020-01-29
Attending: INTERNAL MEDICINE
Payer: MEDICARE

## 2020-01-29 ENCOUNTER — APPOINTMENT (OUTPATIENT)
Dept: NUCLEAR MEDICINE | Age: 65
DRG: 189 | End: 2020-01-29
Attending: INTERNAL MEDICINE
Payer: MEDICARE

## 2020-01-29 LAB
ANION GAP SERPL CALCULATED.3IONS-SCNC: 12 MEQ/L (ref 8–16)
APTT: 41.4 SECONDS (ref 22–38)
APTT: 62.8 SECONDS (ref 22–38)
APTT: 90.3 SECONDS (ref 22–38)
BUN BLDV-MCNC: 29 MG/DL (ref 7–22)
CALCIUM SERPL-MCNC: 8.4 MG/DL (ref 8.5–10.5)
CHLORIDE BLD-SCNC: 108 MEQ/L (ref 98–111)
CO2: 20 MEQ/L (ref 23–33)
CREAT SERPL-MCNC: 1.1 MG/DL (ref 0.4–1.2)
EKG ATRIAL RATE: 72 BPM
EKG P AXIS: 37 DEGREES
EKG P-R INTERVAL: 134 MS
EKG Q-T INTERVAL: 396 MS
EKG QRS DURATION: 128 MS
EKG QTC CALCULATION (BAZETT): 433 MS
EKG R AXIS: -50 DEGREES
EKG T AXIS: 12 DEGREES
EKG VENTRICULAR RATE: 72 BPM
ERYTHROCYTE [DISTWIDTH] IN BLOOD BY AUTOMATED COUNT: 13.8 % (ref 11.5–14.5)
ERYTHROCYTE [DISTWIDTH] IN BLOOD BY AUTOMATED COUNT: 46.3 FL (ref 35–45)
GFR SERPL CREATININE-BSD FRML MDRD: 50 ML/MIN/1.73M2
GLUCOSE BLD-MCNC: 108 MG/DL (ref 70–108)
HCT VFR BLD CALC: 38.3 % (ref 37–47)
HEMOGLOBIN: 12.2 GM/DL (ref 12–16)
INR BLD: 1.2 (ref 0.85–1.13)
MCH RBC QN AUTO: 29.1 PG (ref 26–33)
MCHC RBC AUTO-ENTMCNC: 31.9 GM/DL (ref 32.2–35.5)
MCV RBC AUTO: 91.4 FL (ref 81–99)
PLATELET # BLD: 219 THOU/MM3 (ref 130–400)
PMV BLD AUTO: 10.4 FL (ref 9.4–12.4)
POTASSIUM SERPL-SCNC: 3.7 MEQ/L (ref 3.5–5.2)
RBC # BLD: 4.19 MILL/MM3 (ref 4.2–5.4)
SODIUM BLD-SCNC: 140 MEQ/L (ref 135–145)
WBC # BLD: 13.7 THOU/MM3 (ref 4.8–10.8)

## 2020-01-29 PROCEDURE — 71275 CT ANGIOGRAPHY CHEST: CPT

## 2020-01-29 PROCEDURE — 97110 THERAPEUTIC EXERCISES: CPT

## 2020-01-29 PROCEDURE — 99232 SBSQ HOSP IP/OBS MODERATE 35: CPT | Performed by: INTERNAL MEDICINE

## 2020-01-29 PROCEDURE — A9562 TC99M MERTIATIDE: HCPCS | Performed by: INTERNAL MEDICINE

## 2020-01-29 PROCEDURE — 36415 COLL VENOUS BLD VENIPUNCTURE: CPT

## 2020-01-29 PROCEDURE — 2709999900 HC NON-CHARGEABLE SUPPLY

## 2020-01-29 PROCEDURE — 85027 COMPLETE CBC AUTOMATED: CPT

## 2020-01-29 PROCEDURE — 80048 BASIC METABOLIC PNL TOTAL CA: CPT

## 2020-01-29 PROCEDURE — 6360000002 HC RX W HCPCS: Performed by: INTERNAL MEDICINE

## 2020-01-29 PROCEDURE — 6360000002 HC RX W HCPCS

## 2020-01-29 PROCEDURE — 6370000000 HC RX 637 (ALT 250 FOR IP): Performed by: NURSE PRACTITIONER

## 2020-01-29 PROCEDURE — 6360000002 HC RX W HCPCS: Performed by: RADIOLOGY

## 2020-01-29 PROCEDURE — 78708 K FLOW/FUNCT IMAGE W/DRUG: CPT

## 2020-01-29 PROCEDURE — 2060000000 HC ICU INTERMEDIATE R&B

## 2020-01-29 PROCEDURE — 6360000004 HC RX CONTRAST MEDICATION: Performed by: INTERNAL MEDICINE

## 2020-01-29 PROCEDURE — 6370000000 HC RX 637 (ALT 250 FOR IP): Performed by: INTERNAL MEDICINE

## 2020-01-29 PROCEDURE — 93010 ELECTROCARDIOGRAM REPORT: CPT | Performed by: NUCLEAR MEDICINE

## 2020-01-29 PROCEDURE — 94640 AIRWAY INHALATION TREATMENT: CPT

## 2020-01-29 PROCEDURE — 97162 PT EVAL MOD COMPLEX 30 MIN: CPT

## 2020-01-29 PROCEDURE — 3430000000 HC RX DIAGNOSTIC RADIOPHARMACEUTICAL: Performed by: INTERNAL MEDICINE

## 2020-01-29 PROCEDURE — 2580000003 HC RX 258: Performed by: INTERNAL MEDICINE

## 2020-01-29 PROCEDURE — 93005 ELECTROCARDIOGRAM TRACING: CPT | Performed by: INTERNAL MEDICINE

## 2020-01-29 PROCEDURE — 97530 THERAPEUTIC ACTIVITIES: CPT

## 2020-01-29 PROCEDURE — 85730 THROMBOPLASTIN TIME PARTIAL: CPT

## 2020-01-29 PROCEDURE — 6370000000 HC RX 637 (ALT 250 FOR IP): Performed by: PHARMACIST

## 2020-01-29 PROCEDURE — 85610 PROTHROMBIN TIME: CPT

## 2020-01-29 RX ORDER — WARFARIN SODIUM 3 MG/1
3 TABLET ORAL ONCE
Status: COMPLETED | OUTPATIENT
Start: 2020-01-29 | End: 2020-01-29

## 2020-01-29 RX ORDER — FUROSEMIDE 10 MG/ML
40 INJECTION INTRAMUSCULAR; INTRAVENOUS ONCE
Status: COMPLETED | OUTPATIENT
Start: 2020-01-29 | End: 2020-01-29

## 2020-01-29 RX ORDER — POTASSIUM CHLORIDE 20 MEQ/1
20 TABLET, EXTENDED RELEASE ORAL 2 TIMES DAILY WITH MEALS
Status: DISCONTINUED | OUTPATIENT
Start: 2020-01-29 | End: 2020-01-31

## 2020-01-29 RX ORDER — DILTIAZEM HYDROCHLORIDE 120 MG/1
120 CAPSULE, COATED, EXTENDED RELEASE ORAL DAILY
Status: DISCONTINUED | OUTPATIENT
Start: 2020-01-29 | End: 2020-01-31

## 2020-01-29 RX ORDER — POTASSIUM CHLORIDE 20 MEQ/1
40 TABLET, EXTENDED RELEASE ORAL ONCE
Status: COMPLETED | OUTPATIENT
Start: 2020-01-29 | End: 2020-01-29

## 2020-01-29 RX ORDER — FUROSEMIDE 20 MG/1
20 TABLET ORAL 2 TIMES DAILY
Status: DISCONTINUED | OUTPATIENT
Start: 2020-01-30 | End: 2020-01-31

## 2020-01-29 RX ORDER — IPRATROPIUM BROMIDE AND ALBUTEROL SULFATE 2.5; .5 MG/3ML; MG/3ML
1 SOLUTION RESPIRATORY (INHALATION)
Status: DISCONTINUED | OUTPATIENT
Start: 2020-01-29 | End: 2020-01-29

## 2020-01-29 RX ADMIN — FUROSEMIDE 40 MG: 40 INJECTION, SOLUTION INTRAMUSCULAR; INTRAVENOUS at 08:09

## 2020-01-29 RX ADMIN — WARFARIN SODIUM 3 MG: 3 TABLET ORAL at 18:24

## 2020-01-29 RX ADMIN — IOPAMIDOL 80 ML: 755 INJECTION, SOLUTION INTRAVENOUS at 16:45

## 2020-01-29 RX ADMIN — Medication 12 MILLICURIE: at 07:56

## 2020-01-29 RX ADMIN — HYDROCODONE BITARTRATE AND ACETAMINOPHEN 1 TABLET: 5; 325 TABLET ORAL at 05:02

## 2020-01-29 RX ADMIN — LEVETIRACETAM 1000 MG: 500 TABLET, FILM COATED ORAL at 21:13

## 2020-01-29 RX ADMIN — LEVOTHYROXINE SODIUM 25 MCG: 25 TABLET ORAL at 04:54

## 2020-01-29 RX ADMIN — POTASSIUM CHLORIDE 40 MEQ: 1500 TABLET, EXTENDED RELEASE ORAL at 18:23

## 2020-01-29 RX ADMIN — SIMVASTATIN 80 MG: 40 TABLET, FILM COATED ORAL at 21:13

## 2020-01-29 RX ADMIN — HEPARIN SODIUM 14 UNITS/KG/HR: 10000 INJECTION, SOLUTION INTRAVENOUS at 21:14

## 2020-01-29 RX ADMIN — LEVETIRACETAM 1000 MG: 500 TABLET, FILM COATED ORAL at 09:02

## 2020-01-29 RX ADMIN — HEPARIN SODIUM 11 UNITS/KG/HR: 10000 INJECTION, SOLUTION INTRAVENOUS at 01:02

## 2020-01-29 RX ADMIN — HEPARIN SODIUM 3910 UNITS: 1000 INJECTION INTRAVENOUS; SUBCUTANEOUS at 01:02

## 2020-01-29 RX ADMIN — HYDROCODONE BITARTRATE AND ACETAMINOPHEN 1 TABLET: 5; 325 TABLET ORAL at 09:03

## 2020-01-29 RX ADMIN — CALCIUM ACETATE 667 MG: 667 CAPSULE ORAL at 21:13

## 2020-01-29 RX ADMIN — DILTIAZEM HYDROCHLORIDE 120 MG: 120 CAPSULE, COATED, EXTENDED RELEASE ORAL at 09:12

## 2020-01-29 RX ADMIN — CALCIUM ACETATE 667 MG: 667 CAPSULE ORAL at 09:02

## 2020-01-29 RX ADMIN — Medication 10 ML: at 21:13

## 2020-01-29 RX ADMIN — METOPROLOL SUCCINATE 50 MG: 50 TABLET, EXTENDED RELEASE ORAL at 09:02

## 2020-01-29 RX ADMIN — SODIUM CHLORIDE: 9 INJECTION, SOLUTION INTRAVENOUS at 21:19

## 2020-01-29 RX ADMIN — IPRATROPIUM BROMIDE 0.5 MG: 0.5 SOLUTION RESPIRATORY (INHALATION) at 21:26

## 2020-01-29 ASSESSMENT — PAIN DESCRIPTION - ORIENTATION
ORIENTATION: LEFT;RIGHT
ORIENTATION: RIGHT

## 2020-01-29 ASSESSMENT — PAIN SCALES - GENERAL
PAINLEVEL_OUTOF10: 2
PAINLEVEL_OUTOF10: 1
PAINLEVEL_OUTOF10: 3
PAINLEVEL_OUTOF10: 2
PAINLEVEL_OUTOF10: 4
PAINLEVEL_OUTOF10: 2
PAINLEVEL_OUTOF10: 2
PAINLEVEL_OUTOF10: 4
PAINLEVEL_OUTOF10: 0
PAINLEVEL_OUTOF10: 0
PAINLEVEL_OUTOF10: 2
PAINLEVEL_OUTOF10: 4
PAINLEVEL_OUTOF10: 2
PAINLEVEL_OUTOF10: 2
PAINLEVEL_OUTOF10: 1

## 2020-01-29 ASSESSMENT — PAIN DESCRIPTION - PAIN TYPE
TYPE: ACUTE PAIN
TYPE: ACUTE PAIN;SURGICAL PAIN
TYPE: ACUTE PAIN
TYPE: ACUTE PAIN;SURGICAL PAIN

## 2020-01-29 ASSESSMENT — PAIN DESCRIPTION - PROGRESSION
CLINICAL_PROGRESSION: NOT CHANGED

## 2020-01-29 ASSESSMENT — PAIN DESCRIPTION - LOCATION
LOCATION: FLANK
LOCATION: FLANK;KNEE
LOCATION: FLANK
LOCATION: ABDOMEN

## 2020-01-29 ASSESSMENT — PAIN DESCRIPTION - ONSET
ONSET: GRADUAL

## 2020-01-29 ASSESSMENT — PAIN - FUNCTIONAL ASSESSMENT
PAIN_FUNCTIONAL_ASSESSMENT: ACTIVITIES ARE NOT PREVENTED
PAIN_FUNCTIONAL_ASSESSMENT: PREVENTS OR INTERFERES SOME ACTIVE ACTIVITIES AND ADLS
PAIN_FUNCTIONAL_ASSESSMENT: ACTIVITIES ARE NOT PREVENTED

## 2020-01-29 ASSESSMENT — PAIN DESCRIPTION - DESCRIPTORS
DESCRIPTORS: ACHING

## 2020-01-29 ASSESSMENT — PAIN DESCRIPTION - FREQUENCY
FREQUENCY: INTERMITTENT

## 2020-01-29 NOTE — CONSULTS
for pulmonary embolism and a subsequent CTA of the chest was negative for pulmonary embolism. Lower limb Dopplers were also negative for DVT. Nephrology was consulted for increased creatinine level and it was noted that the patient had been overusing nonsteroidal anti-inflammatory medications after a right total knee arthroplasty and a left total hip arthroplasty. Patient was also seen by cardiology and placed on a heparin drip with plan for conversion to oral anticoagulants. The patient did revert back to a normal sinus rhythm and creatinine levels did improve with the patient having an unknown baseline for her creatinine levels at this time. On initial consultation exam the patient is resting comfortably in her hospital bed and her daughter is at bedside. The patient has her left leg in a CPM which is set to 60 degrees and she states she is comfortable with those settings and denies any significant pain at this time. The wound has postsurgical dressing and appears to be clean dry and intact without any noted bleeding onto the dressing. The patient currently is on 3 L of oxygen and denies any issues with shortness of breath. She was up to the chair today and tolerated that well. After discussion with patient and daughter providing options of either going to the transitional care unit or the acute inpatient rehabilitation unit the patient states that she would prefer to come to the acute inpatient rehabilitation unit prior to returning back to home with assistance. Prior Function  Receives Help From: Family  ADL Assistance: Independent  Homemaking Assistance: Independent  Ambulation Assistance: Independent  Transfer Assistance: Independent  Additional Comments: Pt stating she was completely indep at home with ADL tasks and IADL tasks PTA. Previously was independent of ADLs and used no AD for ambulation prior to recent admission.  Initially has ambulated 25' with RW at contact guard assist with PT. With therapy is not assed for bed mobility, contact guard assist for transfers, and Supervision and contact guard assist with balance. ROM restrictions, WB restrictions, precautions: Restrictions/Precautions: General Precautions, Fall Risk, Contact Precautions    Fall Risk    Current Rehabilitation Assessments:  PT:    OBJECTIVE:     Transfers:  Sit to Stand: Contact Guard Assistance  Stand to [employer] Assistance     Ambulation:  Contact Guard Assistance  Distance: 25ft  Surface: Level Tile  Device:Rolling Walker  Gait Deviations: Forward Flexed Posture, Slow Rayna, Decreased Step Length Bilaterally, Decreased Weight Shift Left, Lean to Right, Decreased Gait Speed, Decreased Heel Strike on Left, Decreased Terminal Knee Extension and slow guarded     Exercise:  Patient was guided in 1 set(s) 10 reps of exercise to both lower extremities. Ankle pumps, Glut sets, Quad sets, Heelslides, Hip abduction/adduction and Straight leg raises. Exercises were completed for increased independence with functional mobility. Rest breaks needed as well as extra time to complete.     Functional Outcome Measures: Completed  AM-PAC Inpatient Mobility without Stair Climbing Raw Score : 15  AM-PAC Inpatient without Stair Climbing T-Scale Score : 43.03     ASSESSMENT:  Assessment: Patient progressing toward established goals. Activity Tolerance:  Patient tolerance of  treatment: good. Pt tolerated session well. Pt demos decreased strength, endurance, balance, and independence with functional mobility. tp will benefit fromc ont PT at this time to return to [facility]     Equipment Recommendations: Other: monitor for needs  Discharge Recommendations:  Continue to assess pending progress IP rehab/TCU     Plan: Times per week: 6x GM/O  Times per day: Daily  Specific instructions for Next Treatment: ther ex, ther act, gait trng  Current Treatment Recommendations: Strengthening, Gait Training, Patient/Caregiver Education & Training, she does not drink alcohol or use drugs. Lives at [address] [place] [address]. Review of Systems     Review of Systems   Constitutional: Positive for activity change and fatigue. Negative for appetite change and fever. HENT: Negative for sore throat and voice change. Eyes: Negative for pain. Respiratory: Positive for shortness of breath. Negative for wheezing. Cardiovascular: Positive for leg swelling. Gastrointestinal: Positive for constipation. Negative for diarrhea and nausea. Endocrine: Negative for cold intolerance. Genitourinary: Negative for hematuria and urgency. Musculoskeletal: Positive for arthralgias, gait problem and joint swelling. Skin: Negative for pallor. Allergic/Immunologic: Negative. Neurological: Positive for weakness. Negative for tremors, seizures, numbness and headaches. Hematological: Negative. Psychiatric/Behavioral: Negative for confusion and decreased concentration. The patient is not nervous/anxious. Medications     Reviewed in EMR   apixaban  5 mg Oral BID    diltiazem  120 mg Oral Daily    potassium chloride  20 mEq Oral BID WC    furosemide  20 mg Oral BID    ipratropium  0.5 mg Nebulization 4x daily    metoprolol succinate  50 mg Oral Daily    calcium acetate  667 mg Oral BID    levothyroxine  25 mcg Oral Daily    sodium chloride flush  10 mL Intravenous 2 times per day    levETIRAcetam  1,000 mg Oral BID    simvastatin  80 mg Oral Nightly     PRNs: loperamide, sodium chloride flush, magnesium hydroxide, HYDROcodone 5 mg - acetaminophen **OR** HYDROcodone 5 mg - acetaminophen, morphine, ondansetron, ipratropium-albuterol    Allergies:    Allergies   Allergen Reactions    Latex Rash    Pcn [Penicillins] Hives     Physical Exam:     Physical Exam:  /60   Pulse 75   Temp 98.8 °F (37.1 °C) (Oral)   Resp 18   Ht 4' 11\" (1.499 m)   Wt 219 lb 8 oz (99.6 kg)   SpO2 92%   BMI 44.33 kg/m²     awake  Orientation:   person, 01/30/20  5:33 AM   Result Value Ref Range    Anion Gap 11.0 8.0 - 16.0 meq/L   Glomerular Filtration Rate, Estimated    Collection Time: 01/30/20  5:33 AM   Result Value Ref Range    Est, Glom Filt Rate 55 (A) ml/min/1.73m2   APTT    Collection Time: 01/30/20  2:54 PM   Result Value Ref Range    aPTT 98.5 (H) 22.0 - 38.0 seconds     No results found for: LABA1C  No results found for: EAG  Recent Labs     01/30/20  0533 01/29/20  0546 01/28/20  0742   WBC 14.9* 13.7* 9.4   HGB 12.5 12.2 11.7*   HCT 39.3 38.3 38.4   MCV 90.8 91.4 95.5    219 192     Imaging  Cta Chest W Wo Contrast    Result Date: 1/29/2020  PROCEDURE: CTA CHEST W WO CONTRAST CLINICAL INFORMATION: rule out PE/ SOB increasing oxygen needs. COMPARISON: VQ scan dated 1/26/2020. TECHNIQUE: 3 mm axial images were obtained through the chest after the administration of 80 mL Isovue-370 injected in left AC. A non-contrast localizer was obtained. 3D reconstructions were performed on the scanner to include MIP coronal and sagittal images through the chest. Isovue was the intravenous contrast utilized. All CT scans at this facility use dose modulation, iterative reconstruction, and/or weight-based dosing when appropriate to reduce radiation dose to as low as reasonably achievable. FINDINGS:  Images are mildly motion degraded. There is a good contrast bolus within the pulmonary arteries. No focal filling defect is identified within the pulmonary arteries to suggest pulmonary embolus. There are surgical changes from upper lobectomy on the left. There is partial collapse of the right lower lobe. There is left basilar atelectasis. There is a 6 mm nodule in the anterior aspect of the residual left lung. Lungs are otherwise clear. No mediastinal or hilar lymphadenopathy is identified. The heart is normal in appearance. Visualized upper abdominal solid organs are unremarkable. Gallbladder is surgically absent.  There are mild degenerative changes of the obtained during bolus injection of radiotracer. 1 minute acquisitions were obtained posteriorly over the abdomen for 30 minutes. Resistive interest were drawn around the kidneys and time activity curve generated. Lasix (40 mg IV) was administered at 9 minutes. FINDINGS: There is prompt and symmetric perfusion to the bilateral kidneys. Initial renogram images demonstrate mild heterogeneous activity of the left renal cortex. Homogeneous activity right renal cortex. Differential renal function is 51% left kidney and 49% right kidney. Subsequent renogram images demonstrate prompt uptake, excretion and clearance of radiotracer by the bilateral kidneys with clearance after Lasix administration. Mild dilatation of upper pole calyx left kidney. No evidence of high-grade obstruction. 1. Prompt and symmetric perfusion of bilateral kidneys. 2. Differential renal function of 51% for left kidney and 49% for the right kidney. 3. Minimal upper pole caliectasis left kidney. 4. Prompt uptake, excretion and clearance of radiotracer from the bilateral kidneys with no evidence of obstruction **This report has been created using voice recognition software. It may contain minor errors which are inherent in voice recognition technology. ** Final report electronically signed by Dr. Tevin Ortiz on 1/29/2020 11:09 AM    Us Renal Complete    Result Date: 1/27/2020  PROCEDURE: US RENAL COMPLETE CLINICAL INFORMATION: GRANT. COMPARISON: No prior study. TECHNIQUE: Grayscale and color images were obtained of both kidneys. FINDINGS: Study is limited by obesity. RIGHT KIDNEY - 9.6 x 5.5 x 4.9 cm Resistive Index - unobtainable Cortical Thickness - 0.68 cm LEFT KIDNEY - 11.4 x 5.9 x 5.2 cm Resistive Index - 0.67 Cortical Thickness - 1.2 cm URINARY BLADDER- collapsed with Jordan cath Right kidney: There is normal echogenicity of the renal parenchyma. There is moderate thinning of the renal cortex. There is minimal hydronephrosis.   No stones are noted. No mass lesions are noted. Left kidney: There is normal echogenicity of the renal parenchyma. There is mild thinning of the renal cortex. There is minimal hydronephrosis. No stones are noted. 3.4 cm complex hypoechoic lesion is nonspecific. The urinary bladder cannot be well evaluated secondary to Jordan catheter. Minimal hydronephrosis bilaterally. There is also cortical thinning bilaterally. Nonspecific complex focus left kidney **This report has been created using voice recognition software. It may contain minor errors which are inherent in voice recognition technology. ** Final report electronically signed by Dr. Gadiel Gutierrez on 1/27/2020 2:08 PM    Xr Chest Portable    Result Date: 1/26/2020  PROCEDURE: XR CHEST PORTABLE CLINICAL INFORMATION: SOB, hypoxemia . COMPARISON: No prior study. TECHNIQUE: Portable semiupright FINDINGS: Heart is enlarged in size. Low lung volumes. No infiltrates or effusions. Vascularity is normal. Clothing artifacts in the midline. Clothing artifacts over the left mid chest. EKG leads overlie the chest.     Cardiomegaly **This report has been created using voice recognition software. It may contain minor errors which are inherent in voice recognition technology. ** Final report electronically signed by Dr. Suzanne Claire on 1/26/2020 6:38 PM    Vl Dup Lower Extremity Venous Bilateral    Result Date: 1/27/2020  PROCEDURE: VL DUP LOWER EXTREMITY VENOUS BILATERAL CLINICAL INFORMATION: Left leg swelling, recent knee surgery TECHNIQUE: High-resolution duplex ultrasound of the bilateral lower extremities was performed. The common femoral, femoral, popliteal and calf vein segments were studied to the ankles. COMPARISON: None. FINDINGS: There is normal compressibility with no evidence of thrombus. Flow study shows normal color flow, augmentation and phasic response. No evidence of deep venous thrombosis in either lower extremity.  **This report has been created using voice recognition software. It may contain minor errors which are inherent in voice recognition technology. **  Final report electronically signed by Dr. Layla Fajardo on 1/27/2020 11:09 AM

## 2020-01-29 NOTE — PROGRESS NOTES
INTERNAL MEDICINE Progress Note  1/29/2020 3:04 PM  Subjective:   Admit Date: 1/26/2020  PCP: Elkin Sims MD  Interval History:   Improved HR control  No SOB, no chest pain  no cough,     Objective:   Vitals: BP (!) 116/58   Pulse 71   Temp 98.4 °F (36.9 °C) (Oral)   Resp 18   Ht 4' 11\" (1.499 m)   Wt 225 lb 3.2 oz (102.2 kg)   SpO2 94%   BMI 45.48 kg/m²   General appearance: alert and cooperative with exam  HEENT:  atraumatic  Neck:  no JVD  Lungs: diminished breath sounds bilaterally  Heart: S1, S2 normal  Abdomen: soft, non-tender; bowel sounds normal; no masses,  no organomegaly  Extremities: dressing left knee  Neurologic:  Alert, oriented, thought content appropriate      Medications:   Scheduled Meds:   diltiazem  120 mg Oral Daily    warfarin  3 mg Oral Once    potassium chloride  40 mEq Oral Once    potassium chloride  20 mEq Oral BID WC    [START ON 1/30/2020] furosemide  20 mg Oral BID    metoprolol succinate  50 mg Oral Daily    warfarin (COUMADIN) daily dosing (placeholder)   Other RX Placeholder    furosemide  20 mg Oral BID    calcium acetate  667 mg Oral BID    levothyroxine  25 mcg Oral Daily    sodium chloride flush  10 mL Intravenous 2 times per day    levETIRAcetam  1,000 mg Oral BID    simvastatin  80 mg Oral Nightly     Continuous Infusions:   sodium chloride 30 mL/hr at 01/28/20 1426    heparin (porcine) 14 Units/kg/hr (01/29/20 1230)       Lab Results:   CBC:   Recent Labs     01/27/20  0558 01/28/20  0742 01/29/20  0546   WBC 11.9* 9.4 13.7*   HGB 12.2 11.7* 12.2    192 219     BMP:    Recent Labs     01/27/20  0558 01/28/20  0742 01/29/20  0546    143 140   K 5.2 4.3 3.7    110 108   CO2 20* 21* 20*   BUN 51* 45* 29*   CREATININE 2.6* 1.4* 1.1   GLUCOSE 100 93 108     Hepatic:   Recent Labs     01/26/20  1919   AST 23   ALT 8*   BILITOT 1.1   ALKPHOS 42     TSH:    Lab Results   Component Value Date    TSH 1.140 01/27/2020     TTE   Technically

## 2020-01-29 NOTE — PROGRESS NOTES
[facility] PHYSICAL THERAPY  EVALUATION  [facility] [email or web address]] ICU STEPDOWN TELEMETRY 4K - [id number]    Time In: 1485  Time Out: 1153  Timed Code Treatment Minutes: 23 Minutes  Minutes: 38          Date: 2020  Patient Name: Alexander Mukherjee,  Gender:  female        MRN: [id number]  : 1955  (75 y.o.)      Referring Practitioner: Samuel Triana MD  Diagnosis: Acute respiratory failure with hypoxemia  Additional Pertinent Hx: Per admitting MD note on 20, patient is a 75 y.o. female who had a left TKR done at [facility] 2 days ago. She was recuperating well when she suddenly became hypoxemic, hypotensive and flipped into a-fib with RVR-HR in the 150s today. She was bolused with IVF and transferred to acute bed at [facility] for further care. Restrictions/Precautions:  Restrictions/Precautions: General Precautions, Fall Risk, Contact Precautions    Subjective:  Chart Reviewed: Yes  Patient assessed for rehabilitation services?: Yes  Subjective: Pt resting in bed and agrees to therapy. General:  Overall Orientation Status: Within Normal Limits    Vision: Impaired  Vision Exceptions: Wears glasses at all times    Hearing: Within functional limits         Pain:  Yes.   Pain Assessment  Pain Assessment: 0-10  Pain Level: 2  Pain Type: Acute pain;Surgical pain  Pain Location: Flank;Knee  Pain Orientation: Left;Right(Left knee, R flank)       Social/Functional History:    Lives With: Alone  Type of Home: House  Home Layout: One level  Home Access: Stairs to enter with rails  Entrance Stairs - Number of Steps: 2+1 with 1 rail  Home Equipment: Alonna Grandchild Help From: Family        Ambulation Assistance: Independent  Transfer Assistance: Independent    Active : Yes  Mode of Transportation: Car          OBJECTIVE:  Range of Motion:  Right Lower Extremity: WFL  Left Lower Extremity: Impaired - knee flex nearly 70 deg; ankle and hip WFL    Strength:  Right Lower Extremity: Impaired - grossly 4-/5  Left Lower Extremity: Impaired - hip and ankle at least 4-/5, knee extension grossly 3/5    Balance:  Static Sitting Balance:  Stand By Assistance  Dynamic Sitting Balance: Stand By Assistance  Static Standing Balance: Stand By Assistance  Dynamic Standing Balance: Contact Guard Assistance    Bed Mobility:  Supine to Sit: Minimal Assistance    Transfers:  Sit to Stand: Contact Guard Assistance  Stand to Sit:Stand By Assistance    Ambulation:  Stand By Assistance, Contact Guard Assistance  Distance: 25 ft  Surface: Level Tile  Device:Standard Walker  Gait Deviations: Forward Flexed Posture, Slow Rayna, Decreased Step Length Bilaterally and Decreased Gait Speed    Exercise:  Patient was guided in 1 set(s) 10 reps of exercise to both lower extremities. Ankle pumps, Heelslides and Long arc quads. Exercises were completed for increased independence with functional mobility. Functional Outcome Measures: Completed  AM-PAC Inpatient Mobility without Stair Climbing Raw Score : 15  AM-PAC Inpatient without Stair Climbing T-Scale Score : 43.03    ASSESSMENT:  Activity Tolerance:  Patient tolerance of  treatment: good. Treatment Initiated: Treatment and education initiated within context of evaluation. Evaluation time included review of current medical information, gathering information related to past medical, social and functional history, completion of standardized testing, formal and informal observation of tasks, assessment of data and development of plan of care and goals. Treatment time included skilled education and facilitation of tasks to increase safety and independence with functional mobility for improved independence and quality of life. Assessment:   Body structures, Functions, Activity limitations: Decreased functional mobility , Decreased endurance, Decreased strength, Decreased ROM, Increased pain  Assessment: Pt is a pleasant 75 y.o. female that was transferred here from [facility] with

## 2020-01-29 NOTE — PROGRESS NOTES
her knee. She reports continued SOB periodically. During rehab she got SOB while walking and had to stop once to rest, she did not have a pulse ox on at that time. She states that the ribs on her right side feels much better, and she is not coughing anymore. Her abdominal pain is gone. Denies constipation, or diarrhea. She still indicates that she would like to do inpatient rehab at the hospital prior to going home. She was reminded to continue to use her incentive spirometer ten times every hour and was provided repeat instructions on its proper use. She is having shortness of breath: Yes  Onset: rapid   Duration:3 day. Diurnal variation:  None. Functional status prior to beginning of symptoms: limited due to knee that she just had replaced   She denies orthopnea. She denies paroxysmal nocturnal dyspnea. She is having chest pain:No    Subjective/Events Past 24 hours/ROS   She is still on O2 via nasal cannula. For renal scan today. No chest pain. Rest of the body systems were reviewed. PMHx   Past Medical History      Diagnosis Date    Hyperlipidemia     Hypertension     Hypothyroidism     Obesity (BMI 30-39. 9)     Seizures ([address]     last sz 20+years ago    Vitamin D deficiency       Past Surgical History        Procedure Laterality Date    APPENDECTOMY  1978    CATARACT REMOVAL  2004   [address]    JOINT REPLACEMENT  7/6/12    left hip     Meds    Current Medications    diltiazem  120 mg Oral Daily    warfarin  3 mg Oral Once    potassium chloride  40 mEq Oral Once    potassium chloride  20 mEq Oral BID WC    [START ON 1/30/2020] furosemide  20 mg Oral BID    metoprolol succinate  50 mg Oral Daily    warfarin (COUMADIN) daily dosing (placeholder)   Other RX Placeholder    furosemide  20 mg Oral BID    calcium acetate  667 mg Oral BID    levothyroxine  25 mcg Oral Daily    sodium chloride flush  10 mL Intravenous 2 times per day    lungs: See HPI for details. No hemoptysis. Cardiovascular: No palpitations or chest pain. Gastrointestinal: Reports some nausea and diarrhea over the weekend, denies vomiting. Neurological: No focal neurologiacal weakness. Extremities: No edema. Musculoskeletal: No complaints. Genitourinary: No complaints. Hematological: Negative. Psychiatric/Behavioral: Negative. Skin: No itching. Vitals     height is 4' 11\" (1.499 m) and weight is 225 lb 3.2 oz (102.2 kg). Her oral temperature is 98.4 °F (36.9 °C). Her blood pressure is 116/58 (abnormal) and her pulse is 71. Her respiration is 18 and oxygen saturation is 94%. Body mass index is 45.48 kg/m². SUPPLEMENTAL O2: O2 Flow Rate (L/min): 3 L/min     I/O        Intake/Output Summary (Last 24 hours) at 1/29/2020 1316  Last data filed at 1/29/2020 1044  Gross per 24 hour   Intake 2437.02 ml   Output 4045 ml   Net -1607.98 ml     I/O last 3 completed shifts: In: 9563 [P.O.:470; I.V.:2087]  Out: 0918 [Urine:2345]   Patient Vitals for the past 96 hrs (Last 3 readings):   Weight   01/29/20 0415 225 lb 3.2 oz (102.2 kg)   01/28/20 0300 216 lb 12.8 oz (98.3 kg)   01/27/20 0340 220 lb 8 oz (100 kg)       Exam   General Appearance: Patient appears moderately built and well nourished in no acute distress  HEENT: Normal, Head is normocephalic, atraumatic. Oropharynx is clear and moist.  No oral thrush. PERRLA  Neck - Supple, No JVD present. No tracheal deviation present. Lungs - Bilateral air entry present. Bilateral aeration good. no wheezes, rales or rhonchi,   Cardiovascular - S1+. S2+, regular rate and rhythm, no murmurs, gallups, or rubs  Abdomen -  soft, nondistended, nontender, no masses or organomegaly  Neurologic - Awake, alert, oriented. There are no focal motor or sensory deficits  Extremities - Left leg is wrapped, pt had total knee replacement on Friday. No cyanosis, clubbing or edema. Musculoskeletal: Normal range of motion.  Patient exhibits no tenderness. Lymphadenopathy:  No cervical adenopathy. Psychiatric: Patient  has a normal mood and affect. Skin - No bruising or bleeding. Labs  - Old records and notes have been reviewed in [facility]   ABG  Lab Results   Component Value Date    PH 7.36 01/26/2020    PO2 64 01/26/2020    PCO2 36 01/26/2020    HCO3 20 01/26/2020    O2SAT 91 01/26/2020     Lab Results   Component Value Date    IFIO2 5 01/26/2020     CBC  Recent Labs     01/27/20  0558 01/28/20  0742 01/29/20  0546   WBC 11.9* 9.4 13.7*   RBC 4.21 4.02* 4.19*   HGB 12.2 11.7* 12.2   HCT 39.8 38.4 38.3   MCV 94.5 95.5 91.4   MCH 29.0 29.1 29.1   MCHC 30.7* 30.5* 31.9*    192 219   MPV 10.4 10.4 10.4      BMP  Recent Labs     01/27/20  0558 01/28/20  0742 01/29/20  0546    143 140   K 5.2 4.3 3.7    110 108   CO2 20* 21* 20*   BUN 51* 45* 29*   CREATININE 2.6* 1.4* 1.1   GLUCOSE 100 93 108   PHOS 4.7  --   --    CALCIUM 8.2* 8.0* 8.4*     LFT  Recent Labs     01/26/20 1919   AST 23   ALT 8*   BILITOT 1.1   ALKPHOS 42     TROP  Lab Results   Component Value Date    TROPONINT 0.122 01/27/2020    TROPONINT 0.102 01/27/2020    TROPONINT 0.054 01/26/2020     BNP  No results for input(s): BNP in the last 72 hours. Lactic Acid  Recent Labs     01/26/20  1919 01/27/20  0056   LACTA 2.8* 2.7*     INR  Recent Labs     01/28/20  1428 01/29/20  0546   INR 1.22* 1.20*     PTT  Recent Labs     01/28/20  2339 01/29/20  0546 01/29/20  1124   APTT 41.4* 90.3* 62.8*     Glucose  No results for input(s): POCGLU in the last 72 hours. UA   Recent Labs     01/26/20  2330   SPECGRAV >1.030*   PHUR 5.0   COLORU DK YELLOW*   PROTEINU 100*   BLOODU TRACE*   RBCUA 3-5   WBCUA 2-4   BACTERIA FEW   NITRU NEGATIVE   BILIRUBINUR SMALL*   UROBILINOGEN 1.0   KETUA 15*   LABCAST 0-4 HYALINE  NONE SEEN   . Sleep studies   Conducted in [place]. Request has been placed for those results.     EKG   EKG 1/28/2020 Atrial fibrillation with rapid ventricular bridging to coumain.  -Continue to use CPAP, waiting on sleep studies.  -Cardiology are are rate controlling afib with toprol XL and Cardizem PO  -Pt. Had surgery on Friday 1/24. Management per primary and orthopedics  -GRANT. Improving, Creatinine 1.1, down from 2.6 on 1/27, with a baseline of 0.9. Management per primary and nephrology.    -If CTA negative for Pulmonary embolism and pt. weaned off oxygen Pulmonology will sign off. Case discussed with nurse and patient/family. Questions and concerns addressed. Electronically signed by   Fabiola Vernon on 1/29/2020 at 1:16 PM     Addendum by Dr. Vinicio Thomas MD:  I have seen and examined the patient independently. Face to face evaluation and examination was performed. The above evaluation and note has been reviewed. Labs and radiographs were reviewed. I Have discussed with Dr. Fabiola Vernon, Medical student about this patient in detail. The above assessment and plan has been reviewed. Please see my modifications mentioned below. My modifications:  CTA of chest:  Jan 29, 2020   PROCEDURE: CTA CHEST W WO CONTRAST   1. No evidence of pulmonary embolus. 2. Bibasilar atelectasis, right greater than left. 3. Surgical changes from prior left partial pneumonectomy. 4. 6 mm nodule in the residual left lung. Consider follow-up CT in one year. No pulmonary embolism. Acute hypoxic respiratory failure due to bilateral basal atelectaisis Right > Left. 6 mm nodule in the residual left lung- needs- repeat CT chest in one year. Will start on Atrovent nebs Q6h for pulmonary toilet along with metanebs. Wean Fio2.     Carla Freedman MD 1/29/2020 6:02 PM

## 2020-01-29 NOTE — PROGRESS NOTES
Cardiology Progress Note      Patient:  Era Gaxiola  YOB: 1955  MRN: [id number]   Acct: [de-identified]  Admit Date:  1/26/2020  Primary Cardiologist: Dr. Saira Connell  Seen by Dr. Gilberto Lomas    Per prior cardiology consult note-  CHIEF COMPLAINT: afib RVR        HPI: This is a pleasant 75 y.o. female presents with left TKR done 3 days prior. She was doing well post-op and flipped into Afib RVR with hypotension and hypoxemia into the 150s. IV fluids given. Transferred to [facility]. No prior cardiac history, but does admit to PAF. No DVT on duplex. V/q scan is indeterminate probability. Cr 2.6, Trop 0.102--0.122, TSH 1.1, Hgb 12. ECG now NSR RBBB LAD, inferior infarct. She has not taken [address].   Currently, no chest pain, angina, ELLISON, orthopnea, PND, sob at rest, palpitations, LE edema, or syncope      Subjective (Events in last 24 hours):     Pt sitting in chair   No chest pain o dizziness or palpitations   Does c/o SOB     Went into AFB with RVR this am when got OOB - 's -- has received IV lopressor - and CDZ CD 120mg po x1 -- still AFB -140  BP stable   BB never restarted after surgery     Daughter at bedside         1/29/2020  Pt sitting up in chair   She states \"I am feeling improved from yesterday - less SOB\"  No chest pain     Tele SR - off IV cardizem   BP stable     Tolerating po diuretics nephrology placed him on       Objective:   BP (!) 116/58   Pulse 71   Temp 98.4 °F (36.9 °C) (Oral)   Resp 18   Ht 4' 11\" (1.499 m)   Wt 225 lb 3.2 oz (102.2 kg)   SpO2 94%   BMI 45.48 kg/m²        TELEMETRY: SR    Physical Exam:  General Appearance: alert and oriented to person, place and time, in no acute distress  Cardiovascular: regular rhythm, normal S1 and S2, no murmurs, rubs, clicks, or gallops, distal pulses intact,   Pulmonary/Chest: clear upper - diminished lower to auscultation bilaterally- no wheezes, rales or rhonchi, normal air movement, no respiratory distress  Abdomen: soft, non-tender, non-distended, normal bowel sounds, no masses Extremities: no cyanosis, clubbing or edema, pulses present    Skin: warm and dry, no rash or erythema  Head: normocephalic and atraumatic  Musculoskeletal: normal range of motion, no joint swelling, deformity or tenderness  Neurological: alert, oriented, normal speech, no focal findings or movement disorder noted    Medications:    diltiazem  120 mg Oral Daily    warfarin  3 mg Oral Once    potassium chloride  40 mEq Oral Once    potassium chloride  20 mEq Oral BID WC    [START ON 2020] furosemide  20 mg Oral BID    metoprolol succinate  50 mg Oral Daily    warfarin (COUMADIN) daily dosing (placeholder)   Other RX Placeholder    furosemide  20 mg Oral BID    calcium acetate  667 mg Oral BID    levothyroxine  25 mcg Oral Daily    sodium chloride flush  10 mL Intravenous 2 times per day    levETIRAcetam  1,000 mg Oral BID    simvastatin  80 mg Oral Nightly      sodium chloride 30 mL/hr at 20 1426    heparin (porcine) 14 Units/kg/hr (20 1230)     loperamide, 2 mg, TID PRN  sodium chloride flush, 10 mL, PRN  magnesium hydroxide, 30 mL, Daily PRN  HYDROcodone 5 mg - acetaminophen, 1 tablet, Q4H PRN    Or  HYDROcodone 5 mg - acetaminophen, 2 tablet, Q4H PRN  morphine, 2 mg, Q3H PRN  ondansetron, 4 mg, Q4H PRN  ipratropium-albuterol, 1 ampule, Q4H PRN  heparin (porcine), 80 Units/kg, PRN  heparin (porcine), 40 Units/kg, PRN        Diagnostics:  EK2020 00:32:20 [address] ROUTINE RETRIEVAL  Normal sinus rhythm  Left axis deviation  Right bundle branch block  Inferior infarct (cited on or before 2020)  Abnormal ECG  When compared with ECG of 2020 13:48,  Premature ventricular complexes are no longer Present  Serial changes of Inferior infarct Present  Confirmed by Joey Hines ([id number]) on 2020 7:42:47 AM    2020 08:39:00 [address] ROUTINE RETRIEVAL  Atrial fibrillation with rapid Ventricle   Normal left ventricular size and systolic function. There were no regional wall motion abnormalities. Wall thickness was within normal limits. Ejection fraction was estimated at 55-60%. Right Atrium   Right atrial size was normal.      Right Ventricle   The right ventricular size was normal with normal systolic function and   wall thickness. Pericardial Effusion   The pericardium was normal in appearance with no evidence of a pericardial   effusion. Pleural Effusion   No evidence of pleural effusion. Aorta / Great Vessels   -Ascending aorta = 2.6 cm. -IVC not welll seen. Stress 10/6/2017   Dr Antwon Moore office  IMPRESSION  IMPRESSION:  1. No evidence of stress induced reversible perfusion defects to suggest  ischemia. 2. Ejection fraction of 69%  3. No focal wall motion abnormality.       Left Heart Cath: never    Lab Data:    Cardiac Enzymes:  Recent Labs     01/27/20  0558   CKTOTAL 197*       CBC:   Lab Results   Component Value Date    WBC 13.7 01/29/2020    RBC 4.19 01/29/2020    HGB 12.2 01/29/2020    HCT 38.3 01/29/2020     01/29/2020       CMP:    Lab Results   Component Value Date     01/29/2020    K 3.7 01/29/2020    K 4.5 01/26/2020     01/29/2020    CO2 20 01/29/2020    BUN 29 01/29/2020    CREATININE 1.1 01/29/2020    LABGLOM 50 01/29/2020    GLUCOSE 108 01/29/2020    CALCIUM 8.4 01/29/2020       Hepatic Function Panel:    Lab Results   Component Value Date    ALKPHOS 42 01/26/2020    ALT 8 01/26/2020    AST 23 01/26/2020    PROT 6.2 01/26/2020    BILITOT 1.1 01/26/2020    LABALBU 3.2 01/26/2020       Magnesium:  No results found for: MG    PT/INR:    Lab Results   Component Value Date    INR 1.20 01/29/2020       HgBA1c:  No results found for: LABA1C    FLP:  No results found for: TRIG, HDL, LDLCALC, LDLDIRECT, LABVLDL    TSH:    Lab Results   Component Value Date    TSH 1.140 01/27/2020         Assessment:    S\p TKR

## 2020-01-29 NOTE — PROGRESS NOTES
[facility]   Nephrology progress note  1/29/2020, 11:19 AM      Pt Name:    Mi Castrejon  MRN:     [id number]     YOB: 1955  Admit Date:    1/26/2020  5:28 PM  Primary Care Physician:  Elvira Shay MD   Room number  2G-96/645-A    Chief Complaint: Nephrology following for GRANT    Subjective:  Patient seen and examined   Clinically comfortable, not as anxious, heart rate controlled  Had renal nuclear scan earlier today, good urine output with Lasix  Yesterday she put out over 4 L    Objective:  24HR INTAKE/OUTPUT:      Intake/Output Summary (Last 24 hours) at 1/29/2020 1119  Last data filed at 1/29/2020 1044  Gross per 24 hour   Intake 2437.02 ml   Output 4045 ml   Net -1607.98 ml     I/O last 3 completed shifts: In: 0830 [P.O.:470; I.V.:2087]  Out: 5615 [Urine:2345]  I/O this shift:  In: -   Out: 1700 [Urine:1700]  Admission weight: 215 lb 8 oz (97.8 kg)  Wt Readings from Last 3 Encounters:   01/29/20 225 lb 3.2 oz (102.2 kg)   09/24/12 190 lb (86.2 kg)   07/05/12 173 lb 8 oz (78.7 kg)     Body mass index is 45.48 kg/m².     Physical examination  VITALS:     Vitals:    01/29/20 0845   BP: 133/63   Pulse: 120   Resp: 18   Temp: 98.1 °F (36.7 °C)   SpO2: 91%     General Appearance: appears comfortable, no distress  Mouth/Throat: Oral mucosa moist  Neck: No JVD  Lungs: Air entry B/L, no rales, no use of accessory muscles  Heart:  S1, S2, intermittently tachycardic  GI: soft, non-tender, no guarding  Extremities: Trace LE edema      Lab Data  CBC:   Recent Labs     01/27/20  0558 01/28/20  0742 01/29/20  0546   WBC 11.9* 9.4 13.7*   HGB 12.2 11.7* 12.2   HCT 39.8 38.4 38.3    192 219     BMP:  Recent Labs     01/27/20  0558 01/28/20  0742 01/29/20  0546    143 140   K 5.2 4.3 3.7    110 108   CO2 20* 21* 20*   BUN 51* 45* 29*   CREATININE 2.6* 1.4* 1.1   GLUCOSE 100 93 108   CALCIUM 8.2* 8.0* 8.4*   PHOS 4.7  --   --      Hepatic:   Recent Labs     01/26/20  1919

## 2020-01-29 NOTE — PROGRESS NOTES
[address] THERAPY MISSED TREATMENT NOTE  [facility] ICU STEPDOWN TELEMETRY 4K  [id number]-93/286-F      Date: 2020  Patient Name: Scott Coronel        CSN: [id number]   : 1955  (75 y.o.)  Gender: female   Referring Practitioner: Dr. Lucretia Grady  Diagnosis: acute respiratory failre with hypoxemia         REASON FOR MISSED TREATMENT: Attempt x 1, nurse with Pt. Attempt x 2, Pt back in bed with CPM on. Nurse reports Pt is going for CTA of chest soon by bed. Will check back 2020.

## 2020-01-29 NOTE — PLAN OF CARE
Problem: Discharge Planning:  Goal: Discharged to appropriate level of care  Description  Discharged to appropriate level of care  Outcome: Ongoing  Note:    Planning for TCU/Rehab    Problem: Mobility - Impaired:  Goal: Mobility will improve  Description  Mobility will improve  Outcome: Ongoing  Note:   Pt up with assistance. Using CPM machine throughout shift     Problem: Infection - Surgical Site:  Goal: Will show no infection signs and symptoms  Description  Will show no infection signs and symptoms  Outcome: Ongoing  Note:   No s/s of infection      Problem: Physical Regulation:  Goal: Prevent transmision of infection  Description  Prevent transmision of infection  Outcome: Ongoing  Note:   Remains in contact precautions     Problem: Urinary Elimination:  Goal: Signs and symptoms of infection will decrease  Description  Signs and symptoms of infection will decrease  Outcome: Ongoing  Note:   Jordan care down once a shift and PRN     Problem: Pain Control  Goal: Maintain pain level at or below patient's acceptable level (or 5 if patient is unable to determine acceptable level)  Outcome: Ongoing  Note:   Pain Assessment: 0-10  Pain Level: 4   Patient's Stated Pain Goal: 5   Is pain goal met at this time?   Yes     Non-Pharmaceutical Pain Intervention(s): Repositioned, Rest       Problem: Cardiovascular  Goal: No DVT, peripheral vascular complications  Outcome: Ongoing  Note:   Pt on Hpearin, bridging with Coumadin     Problem: Cardiovascular  Goal: Hemodynamic stability  Outcome: Ongoing  Note:   Pt in afib RVR     Problem: Respiratory  Goal: No pulmonary complications  Outcome: Ongoing  Note:   Pt remaining on Oxygen 4LNC     Problem: GI  Goal: No bowel complications  Outcome: Ongoing  Note:   Pt having BM's     Problem: Nutrition  Goal: Optimal nutrition therapy  Outcome: Ongoing  Note:   Tolerating PO intake well     Problem: Skin Integrity/Risk  Goal: No skin breakdown during hospitalization  Outcome: Ongoing  Note:   No new skin breakdown. Pt repositioned every two hours      Problem: Musculor/Skeletal Functional Status  Goal: Absence of falls  Outcome: Ongoing  Note:   No falls this shift. Hourly rounding in effect. Bed alarm on. Care plan reviewed with patient and family. Patient and family verbalize understanding of the plan of care and contribute to goal setting.

## 2020-01-30 PROBLEM — G47.33 OSA ON CPAP: Status: ACTIVE | Noted: 2020-01-30

## 2020-01-30 PROBLEM — I48.91 ATRIAL FIBRILLATION WITH RVR (HCC): Status: ACTIVE | Noted: 2020-01-30

## 2020-01-30 PROBLEM — Z99.89 OSA ON CPAP: Status: ACTIVE | Noted: 2020-01-30

## 2020-01-30 PROBLEM — N17.9 AKI (ACUTE KIDNEY INJURY) (HCC): Status: ACTIVE | Noted: 2020-01-30

## 2020-01-30 LAB
ANION GAP SERPL CALCULATED.3IONS-SCNC: 11 MEQ/L (ref 8–16)
APTT: 80.3 SECONDS (ref 22–38)
APTT: 98.5 SECONDS (ref 22–38)
BUN BLDV-MCNC: 26 MG/DL (ref 7–22)
CALCIUM SERPL-MCNC: 8.6 MG/DL (ref 8.5–10.5)
CHLORIDE BLD-SCNC: 104 MEQ/L (ref 98–111)
CO2: 25 MEQ/L (ref 23–33)
CREAT SERPL-MCNC: 1 MG/DL (ref 0.4–1.2)
ERYTHROCYTE [DISTWIDTH] IN BLOOD BY AUTOMATED COUNT: 13.8 % (ref 11.5–14.5)
ERYTHROCYTE [DISTWIDTH] IN BLOOD BY AUTOMATED COUNT: 46 FL (ref 35–45)
GFR SERPL CREATININE-BSD FRML MDRD: 55 ML/MIN/1.73M2
GLUCOSE BLD-MCNC: 99 MG/DL (ref 70–108)
HCT VFR BLD CALC: 39.3 % (ref 37–47)
HEMOGLOBIN: 12.5 GM/DL (ref 12–16)
INR BLD: 1.83 (ref 0.85–1.13)
MCH RBC QN AUTO: 28.9 PG (ref 26–33)
MCHC RBC AUTO-ENTMCNC: 31.8 GM/DL (ref 32.2–35.5)
MCV RBC AUTO: 90.8 FL (ref 81–99)
PLATELET # BLD: 232 THOU/MM3 (ref 130–400)
PMV BLD AUTO: 10.1 FL (ref 9.4–12.4)
POTASSIUM SERPL-SCNC: 4.5 MEQ/L (ref 3.5–5.2)
RBC # BLD: 4.33 MILL/MM3 (ref 4.2–5.4)
SODIUM BLD-SCNC: 140 MEQ/L (ref 135–145)
WBC # BLD: 14.9 THOU/MM3 (ref 4.8–10.8)

## 2020-01-30 PROCEDURE — 6360000002 HC RX W HCPCS: Performed by: INTERNAL MEDICINE

## 2020-01-30 PROCEDURE — 2060000000 HC ICU INTERMEDIATE R&B

## 2020-01-30 PROCEDURE — 2700000000 HC OXYGEN THERAPY PER DAY

## 2020-01-30 PROCEDURE — 36415 COLL VENOUS BLD VENIPUNCTURE: CPT

## 2020-01-30 PROCEDURE — 94761 N-INVAS EAR/PLS OXIMETRY MLT: CPT

## 2020-01-30 PROCEDURE — 94669 MECHANICAL CHEST WALL OSCILL: CPT

## 2020-01-30 PROCEDURE — 80048 BASIC METABOLIC PNL TOTAL CA: CPT

## 2020-01-30 PROCEDURE — 97530 THERAPEUTIC ACTIVITIES: CPT

## 2020-01-30 PROCEDURE — 2709999900 HC NON-CHARGEABLE SUPPLY

## 2020-01-30 PROCEDURE — 97116 GAIT TRAINING THERAPY: CPT

## 2020-01-30 PROCEDURE — 85610 PROTHROMBIN TIME: CPT

## 2020-01-30 PROCEDURE — 99232 SBSQ HOSP IP/OBS MODERATE 35: CPT | Performed by: INTERNAL MEDICINE

## 2020-01-30 PROCEDURE — 85027 COMPLETE CBC AUTOMATED: CPT

## 2020-01-30 PROCEDURE — 6370000000 HC RX 637 (ALT 250 FOR IP): Performed by: INTERNAL MEDICINE

## 2020-01-30 PROCEDURE — 6370000000 HC RX 637 (ALT 250 FOR IP): Performed by: NURSE PRACTITIONER

## 2020-01-30 PROCEDURE — 85730 THROMBOPLASTIN TIME PARTIAL: CPT

## 2020-01-30 PROCEDURE — 97166 OT EVAL MOD COMPLEX 45 MIN: CPT

## 2020-01-30 PROCEDURE — 97110 THERAPEUTIC EXERCISES: CPT

## 2020-01-30 PROCEDURE — 2580000003 HC RX 258: Performed by: INTERNAL MEDICINE

## 2020-01-30 RX ORDER — LOPERAMIDE HYDROCHLORIDE 2 MG/1
2 CAPSULE ORAL 3 TIMES DAILY PRN
Status: CANCELLED | OUTPATIENT
Start: 2020-01-30

## 2020-01-30 RX ORDER — FUROSEMIDE 20 MG/1
20 TABLET ORAL 2 TIMES DAILY
Status: CANCELLED | OUTPATIENT
Start: 2020-01-30

## 2020-01-30 RX ORDER — METOPROLOL SUCCINATE 50 MG/1
50 TABLET, EXTENDED RELEASE ORAL DAILY
Status: CANCELLED | OUTPATIENT
Start: 2020-01-31

## 2020-01-30 RX ORDER — IPRATROPIUM BROMIDE AND ALBUTEROL SULFATE 2.5; .5 MG/3ML; MG/3ML
1 SOLUTION RESPIRATORY (INHALATION) EVERY 4 HOURS PRN
Status: CANCELLED | OUTPATIENT
Start: 2020-01-30

## 2020-01-30 RX ORDER — SIMVASTATIN 40 MG
80 TABLET ORAL NIGHTLY
Status: CANCELLED | OUTPATIENT
Start: 2020-01-30

## 2020-01-30 RX ORDER — HYDROCODONE BITARTRATE AND ACETAMINOPHEN 5; 325 MG/1; MG/1
1 TABLET ORAL EVERY 4 HOURS PRN
Status: CANCELLED | OUTPATIENT
Start: 2020-01-30

## 2020-01-30 RX ORDER — POTASSIUM CHLORIDE 20 MEQ/1
20 TABLET, EXTENDED RELEASE ORAL 2 TIMES DAILY WITH MEALS
Status: CANCELLED | OUTPATIENT
Start: 2020-01-30

## 2020-01-30 RX ORDER — DILTIAZEM HYDROCHLORIDE 120 MG/1
120 CAPSULE, COATED, EXTENDED RELEASE ORAL DAILY
Status: CANCELLED | OUTPATIENT
Start: 2020-01-31

## 2020-01-30 RX ORDER — LEVETIRACETAM 500 MG/1
1000 TABLET ORAL 2 TIMES DAILY
Status: CANCELLED | OUTPATIENT
Start: 2020-01-30

## 2020-01-30 RX ORDER — HYDROCODONE BITARTRATE AND ACETAMINOPHEN 5; 325 MG/1; MG/1
2 TABLET ORAL EVERY 4 HOURS PRN
Status: CANCELLED | OUTPATIENT
Start: 2020-01-30

## 2020-01-30 RX ORDER — LEVOTHYROXINE SODIUM 0.03 MG/1
25 TABLET ORAL DAILY
Status: CANCELLED | OUTPATIENT
Start: 2020-01-31

## 2020-01-30 RX ORDER — WARFARIN SODIUM 1 MG/1
1 TABLET ORAL
Status: DISCONTINUED | OUTPATIENT
Start: 2020-01-30 | End: 2020-01-30

## 2020-01-30 RX ADMIN — LEVOTHYROXINE SODIUM 25 MCG: 25 TABLET ORAL at 03:25

## 2020-01-30 RX ADMIN — HYDROCODONE BITARTRATE AND ACETAMINOPHEN 1 TABLET: 5; 325 TABLET ORAL at 23:23

## 2020-01-30 RX ADMIN — HYDROCODONE BITARTRATE AND ACETAMINOPHEN 1 TABLET: 5; 325 TABLET ORAL at 15:20

## 2020-01-30 RX ADMIN — CALCIUM ACETATE 667 MG: 667 CAPSULE ORAL at 21:23

## 2020-01-30 RX ADMIN — Medication 10 ML: at 21:24

## 2020-01-30 RX ADMIN — Medication 10 ML: at 08:03

## 2020-01-30 RX ADMIN — CALCIUM ACETATE 667 MG: 667 CAPSULE ORAL at 08:03

## 2020-01-30 RX ADMIN — DILTIAZEM HYDROCHLORIDE 120 MG: 120 CAPSULE, COATED, EXTENDED RELEASE ORAL at 08:03

## 2020-01-30 RX ADMIN — LEVETIRACETAM 1000 MG: 500 TABLET, FILM COATED ORAL at 08:03

## 2020-01-30 RX ADMIN — HYDROCODONE BITARTRATE AND ACETAMINOPHEN 1 TABLET: 5; 325 TABLET ORAL at 06:33

## 2020-01-30 RX ADMIN — POTASSIUM CHLORIDE 20 MEQ: 1500 TABLET, EXTENDED RELEASE ORAL at 18:38

## 2020-01-30 RX ADMIN — LEVETIRACETAM 1000 MG: 500 TABLET, FILM COATED ORAL at 21:23

## 2020-01-30 RX ADMIN — SIMVASTATIN 80 MG: 40 TABLET, FILM COATED ORAL at 21:23

## 2020-01-30 RX ADMIN — IPRATROPIUM BROMIDE 0.5 MG: 0.5 SOLUTION RESPIRATORY (INHALATION) at 20:02

## 2020-01-30 RX ADMIN — APIXABAN 5 MG: 5 TABLET, FILM COATED ORAL at 21:23

## 2020-01-30 RX ADMIN — METOPROLOL SUCCINATE 50 MG: 50 TABLET, EXTENDED RELEASE ORAL at 08:03

## 2020-01-30 RX ADMIN — IPRATROPIUM BROMIDE 0.5 MG: 0.5 SOLUTION RESPIRATORY (INHALATION) at 13:35

## 2020-01-30 RX ADMIN — FUROSEMIDE 20 MG: 20 TABLET ORAL at 08:03

## 2020-01-30 RX ADMIN — POTASSIUM CHLORIDE 20 MEQ: 1500 TABLET, EXTENDED RELEASE ORAL at 08:03

## 2020-01-30 RX ADMIN — IPRATROPIUM BROMIDE 0.5 MG: 0.5 SOLUTION RESPIRATORY (INHALATION) at 09:13

## 2020-01-30 RX ADMIN — FUROSEMIDE 20 MG: 20 TABLET ORAL at 18:38

## 2020-01-30 ASSESSMENT — PAIN DESCRIPTION - DESCRIPTORS
DESCRIPTORS: ACHING

## 2020-01-30 ASSESSMENT — PAIN DESCRIPTION - ORIENTATION
ORIENTATION: LEFT

## 2020-01-30 ASSESSMENT — PAIN SCALES - GENERAL
PAINLEVEL_OUTOF10: 0
PAINLEVEL_OUTOF10: 1
PAINLEVEL_OUTOF10: 0
PAINLEVEL_OUTOF10: 2
PAINLEVEL_OUTOF10: 4
PAINLEVEL_OUTOF10: 4
PAINLEVEL_OUTOF10: 0
PAINLEVEL_OUTOF10: 4

## 2020-01-30 ASSESSMENT — PAIN DESCRIPTION - FREQUENCY
FREQUENCY: INTERMITTENT

## 2020-01-30 ASSESSMENT — PAIN DESCRIPTION - PROGRESSION
CLINICAL_PROGRESSION: GRADUALLY WORSENING
CLINICAL_PROGRESSION: GRADUALLY IMPROVING

## 2020-01-30 ASSESSMENT — PAIN DESCRIPTION - PAIN TYPE
TYPE: SURGICAL PAIN

## 2020-01-30 ASSESSMENT — PAIN - FUNCTIONAL ASSESSMENT
PAIN_FUNCTIONAL_ASSESSMENT: ACTIVITIES ARE NOT PREVENTED
PAIN_FUNCTIONAL_ASSESSMENT: ACTIVITIES ARE NOT PREVENTED

## 2020-01-30 ASSESSMENT — PAIN DESCRIPTION - LOCATION
LOCATION: KNEE

## 2020-01-30 ASSESSMENT — PAIN DESCRIPTION - ONSET
ONSET: GRADUAL
ONSET: GRADUAL

## 2020-01-30 NOTE — PROGRESS NOTES
[facility] 58  INPATIENT OCCUPATIONAL THERAPY  [facility] ICU STEPDOWN TELEMETRY 4K  EVALUATION    Time:    Time In: 1130  Time Out: 1211  Timed Code Treatment Minutes: 25 Minutes  Minutes: 41          Date: 2020  Patient Name: Estefany Painting,   Gender: female      MRN: [id number]  : 1955  (75 y.o.)  Referring Practitioner: Dr. Levon Maki  Diagnosis: acute respiratory failre with hypoxemia  Additional Pertinent Hx: The patient is a 75 y.o. female who had a left TKR done at [facility] 2 days ago. She was recuperating well when she suddenly became hypoxemic, hypotensive and flipped into a-fib with RVR-HR in the 150s today. She was bolused with IVF and transferred to acute bed at [facility] for further care. Dopplers of LE Neg for DVT. VQ scan inconclusive for PE. CTA of chest negative for clot and emboli    Restrictions/Precautions:  Restrictions/Precautions: General Precautions, Fall Risk, Contact Precautions    Subjective  Chart Reviewed: Yes, Orders, History and Physical, Imaging  Patient assessed for rehabilitation services?: Yes    Subjective: Pt sitting up in bedside chair with kdaughter present.  Pt reporting feeling better but tired from the activity this am.     Pain:  Pain Assessment  Pain Assessment: 0-10  Pain Level: 2  Pain Type: Surgical pain  Pain Location: Knee  Pain Orientation: Left  Pain Descriptors: Aching  Pain Frequency: Intermittent(with mobility )    Social/Functional History:  Lives With: Alone  Type of Home: House  Home Layout: One level  Home Access: Stairs to enter with rails  Entrance Stairs - Number of Steps: 2+1 with 1 rail  Home Equipment: Cane, Reacher, Sock aid, Long-handled shoehorn, Standard walker(walker has a tray on)   Bathroom Shower/Tub: Tub/Shower unit  Bathroom Toilet: Standard  Bathroom Equipment: 3-in-1 commode  Bathroom Accessibility: Accessible    Receives Help From: Family  ADL Assistance: Independent  Homemaking Assistance: Independent  Ambulation Assistance:

## 2020-01-30 NOTE — PROGRESS NOTES
[facility]      Patient - Estefany Painting   MRN -  [id number]   [facility]   - 1955      Date of Admission -  2020  5:28 PM  Date of evaluation -  2020  Room - Jono Landry MD Primary Care Physician - Roberto Carlos Ron MD     Problem List      Active Hospital Problems    Diagnosis Date Noted    Acute respiratory failure with hypoxemia [facility]) [J96.01] 2020     Reason for Consult    Atelectasis and hypoxia w/ continued need for oxygen  HPI   History Obtained From: Patient, patient's daughter, and electronic medical record. Estefany Painting is a 75 y.o. female with a PMHx of Obesity, hypothyroidism, seizures, HLD, HTN, and S/P left total knee replacement on 20 at [facility] by [facility]. Pulmonology was consulted for sudden onset hypoxia and shortness of breath that started suddenly on POD 2. Pt. Had an acute kidney injury so CTA was delayed until 2020. It showed no evidence of PE, bibasilar atalectasis, surgical changes from prior left partial pneumonectomy, and a 6 mm nodule in the residual left lung. The lung nodule should be followed up with a CTA in 1 year. Primary is managing anticoagulation. Pt. Plans to go to TCU for rehab. She is having shortness of breath: Yes  Onset: rapid   Duration:3 day. Diurnal variation:  None. Functional status prior to beginning of symptoms: limited due to knee that she just had replaced   She denies orthopnea. She denies paroxysmal nocturnal dyspnea. She is having chest pain:No    Subjective/Events Past 24 hours/ROS   -CTA 2020: no evidence of PE, bibasilar atalectasis, 6 mm nodule in left lung. Lung nodule should be followed up with a CT chest with out contrast in 1 year. -O2 weaned down to 1 LPM sating at 96%.  However during physical therapy her O2 sat dropped down to 85%  -Pt tolerating metaneb and Atrovent well  -Reports fatigue during PT, but denies SOB  -Reports abdominal pain  -Denies chest pain, constipation, diarrhea, or dysuria  -Pt plans on going to TCU for rehab prior to returning home    PMHx   Past Medical History      Diagnosis Date    Hyperlipemia 9/24/2012    Hyperlipidemia     Hypertension     Hypothyroidism     Morbid obesity with BMI of 45.0-49.9, [facility]     PAF (paroxysmal atrial fibrillation) [facility]     Seizures ([facility]     last sz 20+years ago    Vitamin D deficiency       Past Surgical History        Procedure Laterality Date    APPENDECTOMY  1978    CATARACT REMOVAL  2004   [address]    CHOLECYSTECTOMY  1978    TOTAL HIP ARTHROPLASTY Left 07/06/2012    TOTAL KNEE ARTHROPLASTY Left 01/24/2020     Meds    Current Medications    warfarin  1 mg Oral Once    diltiazem  120 mg Oral Daily    potassium chloride  20 mEq Oral BID WC    furosemide  20 mg Oral BID    ipratropium  0.5 mg Nebulization 4x daily    metoprolol succinate  50 mg Oral Daily    warfarin (COUMADIN) daily dosing (placeholder)   Other RX Placeholder    calcium acetate  667 mg Oral BID    levothyroxine  25 mcg Oral Daily    sodium chloride flush  10 mL Intravenous 2 times per day    levETIRAcetam  1,000 mg Oral BID    simvastatin  80 mg Oral Nightly     loperamide, sodium chloride flush, magnesium hydroxide, HYDROcodone 5 mg - acetaminophen **OR** HYDROcodone 5 mg - acetaminophen, morphine, ondansetron, ipratropium-albuterol, heparin (porcine), heparin (porcine)  IV Drips/Infusions   sodium chloride 30 mL/hr at 01/29/20 2119    heparin (porcine) 14 Units/kg/hr (01/30/20 0226)     Home Medications  Medications Prior to Admission: simvastatin (ZOCOR) 80 MG tablet, Take 80 mg by mouth nightly  metoprolol succinate (TOPROL XL) 50 MG extended release tablet, Take 50 mg by mouth daily  levETIRAcetam (KEPPRA) 1000 MG tablet, Take 1,000 mg by mouth 2 times daily  aspirin 81 MG EC tablet, Take 81 mg by mouth daily  ibuprofen (ADVIL;MOTRIN) 200 MG tablet, Take 400 mg by mouth every 8 hours as needed   calcium acetate (PHOSLO) 667 MG capsule, Take  by mouth 2 times daily. phenytoin (DILANTIN) 100 MG ER capsule, Take 300 mg by mouth nightly. vitamin D (CHOLECALCIFEROL) 1000 UNIT TABS tablet, Take 1,000 Units by mouth 3 times daily. levothyroxine (SYNTHROID) 25 MCG tablet, Take 25 mcg by mouth Daily. [DISCONTINUED] rosuvastatin (CRESTOR) 20 MG tablet, Take  by mouth. 1/2 tablet nightly   [DISCONTINUED] phenobarbital (LUMINAL) 97.2 MG tablet, Take 97.2 mg by mouth daily. Diet    DIET GENERAL;  Allergies    Latex and Pcn [penicillins]  Social History     Social History     Socioeconomic History    Marital status:       Spouse name: Not on file    Number of children: Not on file    Years of education: Not on file    Highest education level: Not on file   Occupational History    Not on file   Social Needs    Financial resource strain: Not on file    Food insecurity:     Worry: Not on file     Inability: Not on file    Transportation needs:     Medical: Not on file     Non-medical: Not on file   Tobacco Use    Smoking status: Never Smoker    Smokeless tobacco: Never Used   Substance and Sexual Activity    Alcohol use: No    Drug use: No    Sexual activity: Not on file   Lifestyle    Physical activity:     Days per week: Not on file     Minutes per session: Not on file    Stress: Not on file   Relationships    Social connections:     Talks on phone: Not on file     Gets together: Not on file     Attends Hoahaoism service: Not on file     Active member of club or organization: Not on file     Attends meetings of clubs or organizations: Not on file     Relationship status: Not on file    Intimate partner violence:     Fear of current or ex partner: Not on file     Emotionally abused: Not on file     Physically abused: Not on file     Forced sexual activity: Not on file   Other Topics Concern    Not on file thrush. Eyes: Conjunctivae are normal. No scleral icterus. Neck: Neck supple. No tracheal deviation present. Cardiovascular: Regular rate, regular rhythm, S1 and S2 with no murmur. No peripheral edema  Pulmonary/Chest: Normal effort with clear breath sounds. No stridor. No respiratory distress. Patient exhibits no tenderness. Abdominal: Soft, some tenderness in the epigastric region, but no guarding. Bowel sounds audible. No distension. Musculoskeletal: Both legs have stockings on. Pt. Reports rehab is going well. Minimal swelling in left leg   Lymphadenopathy:  No cervical adenopathy. Neurological: Patient is alert and oriented to person, place, and time. Skin: Warm and dry. Labs  - Old records and notes have been reviewed in [facility]   ABG  Lab Results   Component Value Date    PH 7.36 01/26/2020    PO2 64 01/26/2020    PCO2 36 01/26/2020    HCO3 20 01/26/2020    O2SAT 91 01/26/2020     Lab Results   Component Value Date    IFIO2 5 01/26/2020     CBC  Recent Labs     01/28/20  0742 01/29/20  0546 01/30/20  0533   WBC 9.4 13.7* 14.9*   RBC 4.02* 4.19* 4.33   HGB 11.7* 12.2 12.5   HCT 38.4 38.3 39.3   MCV 95.5 91.4 90.8   MCH 29.1 29.1 28.9   MCHC 30.5* 31.9* 31.8*    219 232   MPV 10.4 10.4 10.1      BMP  Recent Labs     01/28/20  0742 01/29/20  0546 01/30/20  0533    140 140   K 4.3 3.7 4.5    108 104   CO2 21* 20* 25   BUN 45* 29* 26*   CREATININE 1.4* 1.1 1.0   GLUCOSE 93 108 99   CALCIUM 8.0* 8.4* 8.6     LFT  No results for input(s): AST, ALT, ALB, BILITOT, ALKPHOS, LIPASE in the last 72 hours. Invalid input(s): AMYLASE  TROP  Lab Results   Component Value Date    TROPONINT 0.122 01/27/2020    TROPONINT 0.102 01/27/2020    TROPONINT 0.054 01/26/2020     BNP  No results for input(s): BNP in the last 72 hours. Lactic Acid  No results for input(s): LACTA in the last 72 hours.   INR  Recent Labs     01/28/20  1428 01/29/20  0546 01/30/20  0533   INR 1.22* 1.20* 1.83* PTT  Recent Labs     01/29/20  0546 01/29/20  1124 01/30/20  0108   APTT 90.3* 62.8* 80.3*     Glucose  No results for input(s): POCGLU in the last 72 hours. UA   No results for input(s): SPECGRAV, PHUR, COLORU, CLARITYU, MUCUS, PROTEINU, BLOODU, RBCUA, WBCUA, BACTERIA, NITRU, GLUCOSEU, BILIRUBINUR, UROBILINOGEN, KETUA, LABCAST, LABCASTTY, AMORPHOS in the last 72 hours. Invalid input(s): CRYSTALS. Sleep studies   Conducted in [employer]. Request has been placed for those results. EKG   01/29/2020 Normal sinus rhythm, Left axis deviation Right bundle branch block, Inferior infarct (cited on or before 26-JAN-2020)    Echocardiogram   Transthoracic Echocardiography Report (TTE)   01/27/2020   Summary   Technically difficult study. Ejection fraction was estimated at 55-60%. There was trace tricuspid regurgitation. Assuming RAP = 5 mmHg, the estimated RVSP = 49 mmHg. Ascending aorta = 2.6 cm. IVC not welll seen. Radiology    CXR  1/26/2020 shows cardiomegaly and low lung volumes but is negative for infiltrate or consolidation. CT Scans  CTA of chest:  Jan 29, 2020   PROCEDURE: CTA CHEST W WO CONTRAST   1. No evidence of pulmonary embolus. 2. Bibasilar atelectasis, right greater than left. 3. Surgical changes from prior left partial pneumonectomy. 4. 6 mm nodule in the residual left lung. Consider follow-up CT in one year.            Venous duplex scan:  Jan 27, 2020   PROCEDURE: VL DUP LOWER EXTREMITY VENOUS BILATERAL   No evidence of deep venous thrombosis in either lower extremity.      Assessment   -Acute hypoxemic respiratory failure, much improved  -Obstructive sleep apnea, stable  - Minimal pulmonary HTN noted on recent echo 2/2 TONEY?  -Afib W RVR, rate controlled, primary managing anticoagulation   -S/P left total knee replacement 1/24- continuing physical therapy  -Acute kidney injury, resolved, nephrology following  -Hx of HTN  -Hx of hypothyroidism    Plan   -Patient resting on 1

## 2020-01-30 NOTE — PROGRESS NOTES
Clinical Pharmacy Note    Warfarin consult follow-up    Recent Labs     01/30/20  0533   INR 1.83*     Recent Labs     01/28/20  0742 01/29/20  0546 01/30/20  0533   HGB 11.7* 12.2 12.5   HCT 38.4 38.3 39.3    219 232       Significant Drug-Drug Interactions:  New warfarin drug-drug interactions: none  Discontinued drug-drug interactions: none  Current warfarin drug-drug interactions: heparin gtt, levothyroxine (HM), simvastatin (HM)     Date INR Warfarin Dose   1/28/2020 1.22 3 mg   1/29/2020   1.20   3 mg    1/30/2020  1.83 1 mg                                        Notes:                     Daily PT/INR until stable within therapeutic range.       Robin KwokD, BCPS 1/30/2020 10:47 AM

## 2020-01-30 NOTE — PLAN OF CARE
Problem: Discharge Planning:  Goal: Discharged to appropriate level of care  Description  Discharged to appropriate level of care  Outcome: Ongoing  Note:               Planning for TCU/Rehab     Problem: Mobility - Impaired:  Goal: Mobility will improve  Description  Mobility will improve  Outcome: Ongoing  Note:   Pt up with assistance. Using CPM machine throughout shift     Problem: Infection - Surgical Site:  Goal: Will show no infection signs and symptoms  Description  Will show no infection signs and symptoms  Outcome: Ongoing  Note:   No s/s of infection      Problem: Physical Regulation:  Goal: Prevent transmision of infection  Description  Prevent transmision of infection  Outcome: Ongoing  Note:   Remains in contact precautions     Problem: Urinary Elimination:  Goal: Signs and symptoms of infection will decrease  Description  Signs and symptoms of infection will decrease  Outcome: Ongoing  Note:   Jordan care down once a shift and PRN     Problem: Pain Control  Goal: Maintain pain level at or below patient's acceptable level (or 5 if patient is unable to determine acceptable level)  Outcome: Ongoing  Note:   Pain Assessment: 0-10  Pain Level: 1   Patient's Stated Pain Goal: 5   Is pain goal met at this time?   Yes  Non-Pharmaceutical Pain Intervention(s): Repositioned, Rest        Problem: Cardiovascular  Goal: No DVT, peripheral vascular complications  Outcome: Ongoing  Note:   Pt on Hpearin, bridging with Coumadin     Problem: Cardiovascular  Goal: Hemodynamic stability  Outcome: Ongoing  Note:   Pt in NSR     Problem: Respiratory  Goal: No pulmonary complications  Outcome: Ongoing  Note:   Pt remaining on Oxygen 3LNC     Problem: GI  Goal: No bowel complications  Outcome: Ongoing  Note:   Pt having BM's     Problem: Nutrition  Goal: Optimal nutrition therapy  Outcome: Ongoing  Note:   Tolerating PO intake well     Problem: Skin Integrity/Risk  Goal: No skin breakdown during hospitalization  Outcome:

## 2020-01-30 NOTE — CARE COORDINATION
1/28/20, 2:42 PM    DISCHARGE ON GOING EVALUATION    [address] [facility] day: 2  Location: [facility]24/024-A Reason for admit: Acute respiratory failure with hypoxemia [facility]) [J96.01]   Procedure:   1/24 [facility]  1/26 VQ Scan  Indeterminate probability for pulmonary embolism.   1/27 ECHO EF 50%  Treatment Plan of Care: Ortho/Nephrology/Cardiology/Pulmonary  Barriers to Discharge: Oxygen 4L continued, Heparin gtt continued, /s; unable to work with therapy today, Cardizem gtt restarted  PCP: Judith Phelan MD  Readmission Risk Score: 11%   Patient Goals/Plan/Treatment Preferences: lives alone, TCU eval pending, plans new Eliquis/Xarelto at discharge per notes
[place] day: 4  Location: 3P-72/743-L Reason for admit: Acute respiratory failure with hypoxemia [facility]) [J96.01]   Procedure:   1/24 [facility]  1/26 VQ Scan  Indeterminate probability for pulmonary embolism. 1/27 ECHO EF 50%  1/29 NM Kidney Scan  1. Prompt and symmetric perfusion of bilateral kidneys. 2. Differential renal function of 51% for left kidney and 49% for the right kidney. 3. Minimal upper pole caliectasis left kidney. 4. Prompt uptake, excretion and clearance of radiotracer from the bilateral kidneys with no evidence of obstruction  1/29 CT Chest  1. No evidence of pulmonary embolus. 2. Bibasilar atelectasis, right greater than left. 3. Surgical changes from prior left partial pneumonectomy. 4. 6 mm nodule in the residual left lung  Treatment Plan of Care: Ortho/Nephrology/Cardiology/Pulmonary  Barriers to Discharge: INR 1.83; monitor. Oxygen 1L continued; weaning as able.  Heparin gtt continued, ; monitor; PO Coumadin continued  PCP: Christian Copeland MD  Readmission Risk Score: 9%   Patient Goals/Plan/Treatment Preferences: lives alone, await Physiatry recommendations for possible TCU/Rehab when medically cleared and off Heparin gtt, plans new Eliquis/Xarelto at discharge per notes, therapies following; await therapy input; collaborated with [employer], [address], [address]
client and daughter; plans new Rehab (versus TCU); eval pending; left message for [employer], [employer], insurance listed is incorrect; it is supposed to be Medicare/[facility]; called/faxed information to [employer]  Transportation/Food Security/Housekeeping Addressed:  No issues identified.     Evaluation: no    Update: paged Attending for therapy orders; await reply; collaborated with [facility], RN  Electronically signed by Erasmo Griffin RN on 1/27/2020 at 3:30 PM      Update: received therapy orders from Attending  Electronically signed by Erasmo Griffin RN on 1/28/2020 at 7:33 AM

## 2020-01-30 NOTE — FLOWSHEET NOTE
01/29/20 2037   Encounter Summary   Services provided to: Patient   Referral/Consult From: [facility] Family members   [facility] none   Continue Visiting Yes  (1/29/20 continue support)   Complexity of Encounter Low   Length of Encounter 15 minutes   Spiritual Assessment Completed Yes     Jed Koch is a 75year old female who is on sitting at the edge of her bed waiting for her nurse to help her. Patient engaged in conversation and told me she is admitted for respiratory problem. Patient was apologetic that she could get in long conversation with me. She asked me to come soon to see her again. SC will follow up with patient and her family the next day or as needed.

## 2020-01-30 NOTE — PROGRESS NOTES
Assistance    Ambulation:  Contact Guard Assistance  Distance: 25ft  Surface: Level Tile  Device:Rolling Walker  Gait Deviations: Forward Flexed Posture, Slow Rayna, Decreased Step Length Bilaterally, Decreased Weight Shift Left, Lean to Right, Decreased Gait Speed, Decreased Heel Strike on Left, Decreased Terminal Knee Extension and slow guarded    Exercise:  Patient was guided in 1 set(s) 10 reps of exercise to both lower extremities. Ankle pumps, Glut sets, Quad sets, Heelslides, Hip abduction/adduction and Straight leg raises. Exercises were completed for increased independence with functional mobility. Rest breaks needed as well as extra time to complete. Functional Outcome Measures: Completed  AM-PAC Inpatient Mobility without Stair Climbing Raw Score : 15  AM-PAC Inpatient without Stair Climbing T-Scale Score : 43.03    ASSESSMENT:  Assessment: Patient progressing toward established goals. Activity Tolerance:  Patient tolerance of  treatment: good. Pt tolerated session well. Pt demos decreased strength, endurance, balance, and independence with functional mobility. tp will benefit fromc ont PT at this time to return to [facility]     Equipment Recommendations: Other: monitor for needs  Discharge Recommendations:  Continue to assess pending progress IP rehab/TCU    Plan: Times per week: 6x GM/O  Times per day: Daily  Specific instructions for Next Treatment: ther ex, ther act, gait trng  Current Treatment Recommendations: Strengthening, Gait Training, Patient/Caregiver Education & Training, ROM, Functional Mobility Training, Endurance Training, Transfer Training, Safety Education & Training, Home Exercise Program, Equipment Evaluation, Education, & procurement    Patient Education  Patient Education: Plan of Care, [employer] Mobility, Transfers, Gait, Verbal Exercise Instruction    Goals:  Patient goals : go home  Short term goals  Time Frame for Short term goals: at discharge  Short term goal 1: Pt to be Mod I for

## 2020-01-30 NOTE — PROGRESS NOTES
INTERNAL MEDICINE Progress Note  1/30/2020 4:43 PM  Subjective:   Admit Date: 1/26/2020  PCP: Benton Barnett MD  Interval History:   Improved HR control  No SOB, no chest pain, CTA chest negative for PE  no cough,     Objective:   Vitals: /60   Pulse 77   Temp 99.9 °F (37.7 °C) (Oral)   Resp 17   Ht 4' 11\" (1.499 m)   Wt 219 lb 8 oz (99.6 kg)   SpO2 90%   BMI 44.33 kg/m²   General appearance: alert and cooperative with exam  HEENT:  atraumatic  Neck:  no JVD  Lungs: diminished breath sounds bilaterally in the bases  Heart: S1, S2 normal  Abdomen: soft, non-tender; bowel sounds normal; no masses,  no organomegaly  Extremities: dressing left knee  Neurologic:  Alert, oriented, thought content appropriate      Medications:   Scheduled Meds:   warfarin  1 mg Oral Once    diltiazem  120 mg Oral Daily    potassium chloride  20 mEq Oral BID WC    furosemide  20 mg Oral BID    ipratropium  0.5 mg Nebulization 4x daily    metoprolol succinate  50 mg Oral Daily    warfarin (COUMADIN) daily dosing (placeholder)   Other RX Placeholder    calcium acetate  667 mg Oral BID    levothyroxine  25 mcg Oral Daily    sodium chloride flush  10 mL Intravenous 2 times per day    levETIRAcetam  1,000 mg Oral BID    simvastatin  80 mg Oral Nightly     Continuous Infusions:   sodium chloride 30 mL/hr at 01/29/20 2119    heparin (porcine) 14 Units/kg/hr (01/30/20 0226)       Lab Results:   CBC:   Recent Labs     01/28/20  0742 01/29/20  0546 01/30/20  0533   WBC 9.4 13.7* 14.9*   HGB 11.7* 12.2 12.5    219 232     BMP:    Recent Labs     01/28/20  0742 01/29/20  0546 01/30/20  0533    140 140   K 4.3 3.7 4.5    108 104   CO2 21* 20* 25   BUN 45* 29* 26*   CREATININE 1.4* 1.1 1.0   GLUCOSE 93 108 99     TSH:    Lab Results   Component Value Date    TSH 1.140 01/27/2020     TTE   Technically difficult study.   Ejection fraction was estimated at 55-60%.   There was trace tricuspid regurgitation.   Assuming RAP = 5 mmHg, the estimated RVSP = 49 mmHg.   Ascending aorta = 2.6 cm.  IVC not welll seen.     Renal USS  Minimal hydronephrosis bilaterally. There is also cortical thinning bilaterally. Nonspecific complex focus left kidney    VL DUP LOWER EXTREMITY VENOUS BILATERAL  No evidence of deep venous thrombosis in either lower extremity. NM LUNG VENT/PERFUSION (VQ)  1. Indeterminate probability for pulmonary embolism. Consider further evaluation with chest CTA. CTA chest:  1. No evidence of pulmonary embolus. 2. Bibasilar atelectasis, right greater than left. 3. Surgical changes from prior left partial pneumonectomy. 4. 6 mm nodule in the residual left lung. Consider follow-up CT in one year. Assessment and Plan:   1. Acute hypoxemic resp failure, on oxygen  2. VQ scan Indeterminate prob for PE, on anticoag  3. A fib, RVR, restart LEHTA cardizem and titrate to HR < 100  4. Elevated troponin prob ass with #3  5. GRANT, baseline Cr was 0.9, improved  6. S/p left total knee replacement  7. HTN  8. Hypothyroidism  9. TONEY on nocte CPAP  10. Seizure(h/o)     Improved, cont to wean oxygen. stop LETHA heparin. Start NOAC eliquis.   Bronchodilators, wean oxygen  Will discharge to rehab bed in am.      Lyndsey Wiseman MD

## 2020-01-30 NOTE — DISCHARGE SUMMARY
Discharge Summary    Gigi Saha  :  1955  MRN:  [id number]    Admit date:  2020  Discharge date:      Admitting Physician:  Martine Wayne MD    Discharge Diagnoses:    1. Acute hypoxemic resp failure, on oxygen  2. VQ scan Indeterminate prob for PE, CTA chest negative for PE  3. A fib, controlled on CCB, BB  4. Elevated troponin prob ass with #3  5. GRANT, baseline Cr was 0.9, improved  6. S/p left total knee replacement  7. HTN  8. Hypothyroidism  9. TONEY on nocte CPAP  10. Seizure(h/o)      Admission Condition:  serious  Discharged Condition:  good    Hospital Course:   Patient was admitted from [address] following left TKR with hypoxemia, hypotension associated with a fib with RVR. She had GRANT with Cr 2.1. she was hydrated, maintained on cardizem gtt and BB. Anticoagulation initiated for a fib. She was worked up for PE, initial VQ scan was indeterminate, CTA chest done after renal function normalized was negative for PE. She will continue NOAC for  A fib, cont BB, CCB. Cont PT/OT. Discharged to rehab 7E bed in stable condition. Discharge Medications:       Chrissy Atkinson   Home Medication Instructions QBA:[id number]    Printed on:20 1412   Medication Information                      apixaban (ELIQUIS) 5 MG TABS tablet  Take 1 tablet by mouth 2 times daily             calcium acetate (PHOSLO) 667 MG capsule  Take  by mouth 2 times daily. diltiazem (CARDIZEM CD) 120 MG extended release capsule  Take 1 capsule by mouth daily             furosemide (LASIX) 20 MG tablet  Take 1 tablet by mouth 2 times daily             HYDROcodone-acetaminophen (NORCO) 5-325 MG per tablet  Take 1 tablet by mouth every 4 hours as needed for Pain for up to 3 days. levETIRAcetam (KEPPRA) 1000 MG tablet  Take 1,000 mg by mouth 2 times daily             levothyroxine (SYNTHROID) 25 MCG tablet  Take 25 mcg by mouth Daily.                loperamide (IMODIUM) 2 MG capsule  Take 1 capsule by chest CTA.     CTA chest:  1. No evidence of pulmonary embolus. 2. Bibasilar atelectasis, right greater than left. 3. Surgical changes from prior left partial pneumonectomy. 4. 6 mm nodule in the residual left lung. Consider follow-up CT in one year.     Assessment and Plan:   11. Acute hypoxemic resp failure, on oxygen  12. VQ scan Indeterminate prob for PE,   13. A fib,   14. Elevated troponin prob ass with #3  15. GRANT, baseline Cr was 0.9, improved  16. S/p left total knee replacement  17. HTN  18. Hypothyroidism  19. TONEY on nocte CPAP  20. Seizure(h/o)     Improved, cont to wean oxygen. BB, CCB, NOAC eliquis. Bronchodilators,   Discharge to rehab.     Treatments:  Bronchodilators, LETHA hydration, anticoagulation    Disposition:   Rehab    Signed:  Milvia Lugo  1/31/2020, 2:12 PM

## 2020-01-31 ENCOUNTER — APPOINTMENT (OUTPATIENT)
Dept: ULTRASOUND IMAGING | Age: 65
DRG: 189 | End: 2020-01-31
Attending: INTERNAL MEDICINE
Payer: MEDICARE

## 2020-01-31 ENCOUNTER — APPOINTMENT (OUTPATIENT)
Dept: GENERAL RADIOLOGY | Age: 65
DRG: 559 | End: 2020-01-31
Attending: PHYSICAL MEDICINE & REHABILITATION
Payer: MEDICARE

## 2020-01-31 ENCOUNTER — HOSPITAL ENCOUNTER (INPATIENT)
Age: 65
LOS: 9 days | Discharge: HOME | DRG: 559 | End: 2020-02-09
Attending: PHYSICAL MEDICINE & REHABILITATION | Admitting: PHYSICAL MEDICINE & REHABILITATION
Payer: MEDICARE

## 2020-01-31 VITALS
HEART RATE: 80 BPM | TEMPERATURE: 98.1 F | OXYGEN SATURATION: 92 % | DIASTOLIC BLOOD PRESSURE: 61 MMHG | BODY MASS INDEX: 44.41 KG/M2 | WEIGHT: 220.3 LBS | RESPIRATION RATE: 18 BRPM | SYSTOLIC BLOOD PRESSURE: 127 MMHG | HEIGHT: 59 IN

## 2020-01-31 PROBLEM — Z96.652 S/P TOTAL KNEE ARTHROPLASTY, LEFT: Status: ACTIVE | Noted: 2020-01-31

## 2020-01-31 LAB
ANION GAP SERPL CALCULATED.3IONS-SCNC: 9 MEQ/L (ref 8–16)
BUN BLDV-MCNC: 25 MG/DL (ref 7–22)
CALCIUM SERPL-MCNC: 8.6 MG/DL (ref 8.5–10.5)
CHLORIDE BLD-SCNC: 104 MEQ/L (ref 98–111)
CO2: 25 MEQ/L (ref 23–33)
CREAT SERPL-MCNC: 1 MG/DL (ref 0.4–1.2)
GFR SERPL CREATININE-BSD FRML MDRD: 55 ML/MIN/1.73M2
GLUCOSE BLD-MCNC: 114 MG/DL (ref 70–108)
POTASSIUM SERPL-SCNC: 4 MEQ/L (ref 3.5–5.2)
SODIUM BLD-SCNC: 138 MEQ/L (ref 135–145)

## 2020-01-31 PROCEDURE — 71046 X-RAY EXAM CHEST 2 VIEWS: CPT

## 2020-01-31 PROCEDURE — 99232 SBSQ HOSP IP/OBS MODERATE 35: CPT | Performed by: INTERNAL MEDICINE

## 2020-01-31 PROCEDURE — 97530 THERAPEUTIC ACTIVITIES: CPT

## 2020-01-31 PROCEDURE — 36415 COLL VENOUS BLD VENIPUNCTURE: CPT

## 2020-01-31 PROCEDURE — 6370000000 HC RX 637 (ALT 250 FOR IP): Performed by: INTERNAL MEDICINE

## 2020-01-31 PROCEDURE — 6370000000 HC RX 637 (ALT 250 FOR IP): Performed by: NURSE PRACTITIONER

## 2020-01-31 PROCEDURE — 94669 MECHANICAL CHEST WALL OSCILL: CPT

## 2020-01-31 PROCEDURE — 76775 US EXAM ABDO BACK WALL LIM: CPT

## 2020-01-31 PROCEDURE — 2709999900 HC NON-CHARGEABLE SUPPLY

## 2020-01-31 PROCEDURE — 97535 SELF CARE MNGMENT TRAINING: CPT

## 2020-01-31 PROCEDURE — 2580000003 HC RX 258: Performed by: INTERNAL MEDICINE

## 2020-01-31 PROCEDURE — 80048 BASIC METABOLIC PNL TOTAL CA: CPT

## 2020-01-31 PROCEDURE — 94760 N-INVAS EAR/PLS OXIMETRY 1: CPT

## 2020-01-31 PROCEDURE — 1180000000 HC REHAB R&B

## 2020-01-31 PROCEDURE — 74018 RADEX ABDOMEN 1 VIEW: CPT

## 2020-01-31 PROCEDURE — 6360000002 HC RX W HCPCS: Performed by: INTERNAL MEDICINE

## 2020-01-31 RX ORDER — SENNA PLUS 8.6 MG/1
2 TABLET ORAL NIGHTLY PRN
Status: DISCONTINUED | OUTPATIENT
Start: 2020-01-31 | End: 2020-02-09 | Stop reason: HOSPADM

## 2020-01-31 RX ORDER — FUROSEMIDE 20 MG/1
20 TABLET ORAL 2 TIMES DAILY
Qty: 60 TABLET | Refills: 3 | Status: ON HOLD
Start: 2020-01-31 | End: 2020-02-09 | Stop reason: HOSPADM

## 2020-01-31 RX ORDER — METOPROLOL SUCCINATE 50 MG/1
50 TABLET, EXTENDED RELEASE ORAL DAILY
Status: DISCONTINUED | OUTPATIENT
Start: 2020-02-01 | End: 2020-02-09 | Stop reason: HOSPADM

## 2020-01-31 RX ORDER — LOPERAMIDE HYDROCHLORIDE 2 MG/1
2 CAPSULE ORAL 3 TIMES DAILY PRN
Qty: 30 CAPSULE | Refills: 0 | Status: ON HOLD
Start: 2020-01-31 | End: 2020-02-09 | Stop reason: HOSPADM

## 2020-01-31 RX ORDER — HYDROCODONE BITARTRATE AND ACETAMINOPHEN 5; 325 MG/1; MG/1
2 TABLET ORAL EVERY 4 HOURS PRN
Status: DISPENSED | OUTPATIENT
Start: 2020-01-31 | End: 2020-02-07

## 2020-01-31 RX ORDER — HYDROCODONE BITARTRATE AND ACETAMINOPHEN 5; 325 MG/1; MG/1
1 TABLET ORAL EVERY 4 HOURS PRN
Qty: 12 TABLET | Refills: 0 | Status: ON HOLD
Start: 2020-01-31 | End: 2020-02-09 | Stop reason: HOSPADM

## 2020-01-31 RX ORDER — HYDROCODONE BITARTRATE AND ACETAMINOPHEN 5; 325 MG/1; MG/1
1 TABLET ORAL EVERY 4 HOURS PRN
Status: DISPENSED | OUTPATIENT
Start: 2020-01-31 | End: 2020-02-07

## 2020-01-31 RX ORDER — DOCUSATE SODIUM 100 MG/1
100 CAPSULE, LIQUID FILLED ORAL 2 TIMES DAILY PRN
Status: DISCONTINUED | OUTPATIENT
Start: 2020-01-31 | End: 2020-02-09 | Stop reason: HOSPADM

## 2020-01-31 RX ORDER — POTASSIUM CHLORIDE 20 MEQ/1
20 TABLET, EXTENDED RELEASE ORAL 2 TIMES DAILY WITH MEALS
Status: DISCONTINUED | OUTPATIENT
Start: 2020-01-31 | End: 2020-02-07

## 2020-01-31 RX ORDER — LEVETIRACETAM 500 MG/1
1000 TABLET ORAL 2 TIMES DAILY
Status: DISCONTINUED | OUTPATIENT
Start: 2020-01-31 | End: 2020-02-09 | Stop reason: HOSPADM

## 2020-01-31 RX ORDER — DILTIAZEM HYDROCHLORIDE 120 MG/1
120 CAPSULE, COATED, EXTENDED RELEASE ORAL DAILY
Status: DISCONTINUED | OUTPATIENT
Start: 2020-02-01 | End: 2020-02-09 | Stop reason: HOSPADM

## 2020-01-31 RX ORDER — IPRATROPIUM BROMIDE AND ALBUTEROL SULFATE 2.5; .5 MG/3ML; MG/3ML
1 SOLUTION RESPIRATORY (INHALATION) EVERY 4 HOURS PRN
Status: DISCONTINUED | OUTPATIENT
Start: 2020-01-31 | End: 2020-02-08

## 2020-01-31 RX ORDER — LEVOTHYROXINE SODIUM 0.03 MG/1
25 TABLET ORAL DAILY
Status: DISCONTINUED | OUTPATIENT
Start: 2020-02-01 | End: 2020-02-09 | Stop reason: HOSPADM

## 2020-01-31 RX ORDER — DILTIAZEM HYDROCHLORIDE 120 MG/1
120 CAPSULE, COATED, EXTENDED RELEASE ORAL DAILY
Qty: 30 CAPSULE | Refills: 3 | Status: ON HOLD
Start: 2020-02-01 | End: 2020-02-09 | Stop reason: SDUPTHER

## 2020-01-31 RX ORDER — ACETAMINOPHEN 325 MG/1
650 TABLET ORAL EVERY 4 HOURS PRN
Status: DISCONTINUED | OUTPATIENT
Start: 2020-01-31 | End: 2020-02-09 | Stop reason: HOSPADM

## 2020-01-31 RX ORDER — LOPERAMIDE HYDROCHLORIDE 2 MG/1
2 CAPSULE ORAL 3 TIMES DAILY PRN
Status: DISCONTINUED | OUTPATIENT
Start: 2020-01-31 | End: 2020-02-09 | Stop reason: HOSPADM

## 2020-01-31 RX ORDER — FUROSEMIDE 20 MG/1
20 TABLET ORAL 2 TIMES DAILY
Status: DISCONTINUED | OUTPATIENT
Start: 2020-01-31 | End: 2020-02-07

## 2020-01-31 RX ORDER — SIMVASTATIN 40 MG
80 TABLET ORAL NIGHTLY
Status: DISCONTINUED | OUTPATIENT
Start: 2020-01-31 | End: 2020-02-09 | Stop reason: HOSPADM

## 2020-01-31 RX ADMIN — HYDROCODONE BITARTRATE AND ACETAMINOPHEN 1 TABLET: 5; 325 TABLET ORAL at 21:12

## 2020-01-31 RX ADMIN — FUROSEMIDE 20 MG: 20 TABLET ORAL at 09:00

## 2020-01-31 RX ADMIN — LEVETIRACETAM 1000 MG: 500 TABLET, FILM COATED ORAL at 21:00

## 2020-01-31 RX ADMIN — APIXABAN 5 MG: 5 TABLET, FILM COATED ORAL at 08:59

## 2020-01-31 RX ADMIN — LEVETIRACETAM 1000 MG: 500 TABLET, FILM COATED ORAL at 09:00

## 2020-01-31 RX ADMIN — POTASSIUM CHLORIDE 20 MEQ: 1500 TABLET, EXTENDED RELEASE ORAL at 18:13

## 2020-01-31 RX ADMIN — METOPROLOL SUCCINATE 50 MG: 50 TABLET, EXTENDED RELEASE ORAL at 09:00

## 2020-01-31 RX ADMIN — FUROSEMIDE 20 MG: 20 TABLET ORAL at 18:12

## 2020-01-31 RX ADMIN — Medication 10 ML: at 09:05

## 2020-01-31 RX ADMIN — LEVOTHYROXINE SODIUM 25 MCG: 25 TABLET ORAL at 06:45

## 2020-01-31 RX ADMIN — POTASSIUM CHLORIDE 20 MEQ: 1500 TABLET, EXTENDED RELEASE ORAL at 08:59

## 2020-01-31 RX ADMIN — DILTIAZEM HYDROCHLORIDE 120 MG: 120 CAPSULE, COATED, EXTENDED RELEASE ORAL at 08:59

## 2020-01-31 RX ADMIN — APIXABAN 5 MG: 5 TABLET, FILM COATED ORAL at 21:00

## 2020-01-31 RX ADMIN — IPRATROPIUM BROMIDE 0.5 MG: 0.5 SOLUTION RESPIRATORY (INHALATION) at 11:48

## 2020-01-31 RX ADMIN — SIMVASTATIN 80 MG: 40 TABLET, FILM COATED ORAL at 21:10

## 2020-01-31 RX ADMIN — CALCIUM ACETATE 667 MG: 667 CAPSULE ORAL at 09:00

## 2020-01-31 RX ADMIN — CALCIUM ACETATE 667 MG: 667 CAPSULE ORAL at 21:00

## 2020-01-31 ASSESSMENT — ENCOUNTER SYMPTOMS
EYE PAIN: 0
SHORTNESS OF BREATH: 1
VOICE CHANGE: 0
DIARRHEA: 0
NAUSEA: 0
ALLERGIC/IMMUNOLOGIC NEGATIVE: 1
CONSTIPATION: 1
WHEEZING: 0
SORE THROAT: 0

## 2020-01-31 ASSESSMENT — PAIN SCALES - GENERAL
PAINLEVEL_OUTOF10: 3
PAINLEVEL_OUTOF10: 0
PAINLEVEL_OUTOF10: 2
PAINLEVEL_OUTOF10: 1
PAINLEVEL_OUTOF10: 1

## 2020-01-31 ASSESSMENT — PAIN DESCRIPTION - PAIN TYPE
TYPE: ACUTE PAIN

## 2020-01-31 ASSESSMENT — PAIN DESCRIPTION - ORIENTATION
ORIENTATION: RIGHT

## 2020-01-31 ASSESSMENT — PAIN DESCRIPTION - LOCATION
LOCATION: LEG
LOCATION: LEG
LOCATION: HIP

## 2020-01-31 ASSESSMENT — PAIN DESCRIPTION - DESCRIPTORS
DESCRIPTORS: ACHING
DESCRIPTORS: DISCOMFORT
DESCRIPTORS: BURNING

## 2020-01-31 ASSESSMENT — PAIN DESCRIPTION - ONSET: ONSET: SUDDEN

## 2020-01-31 ASSESSMENT — PAIN DESCRIPTION - DIRECTION: RADIATING_TOWARDS: NO

## 2020-01-31 NOTE — PROGRESS NOTES
[facility]  PHYSICAL THERAPY MISSED TREATMENT NOTE  ACUTE CARE    Date: 2020  Patient Name: Mari Long        MRN: [id number]   : 1955  (75 y.o.)  Gender: female   Referring Practitioner: Dr. Nelsy Frederick  Referring Practitioner: Yao Posada MD  Diagnosis: acute respiratory failre with hypoxemia  Diagnosis: Acute respiratory failure with hypoxemia         REASON FOR MISSED TREATMENT:  Pt off of floor for procedure then will be going to rehab. Plan to see pt on next scheduled visit.

## 2020-01-31 NOTE — PROGRESS NOTES
Admitted to the Inpatient Rehabilitation Unit via w/c. Patient was then oriented to room and unit. Education provided on the rehabilitation routine: three hours of therapy five days per week and dining room for lunch. Explained patients right to have family, representative or physician notified of their admission. Patient has  for physician to be notified. Patient has declined for family/representative to be notified. Admitting medication orders compared with acute stay medications; home medication list reviewed with patient/family. Medication issues identified n/a  Medication issue no  If yes, physician notified . Bladder and Bowel Function Assessment:  1. Prior history of bladder problems: no  2. Number of pads used per day: 0  3. Frequency of night time 0-1  4. Fluid intake volume and pattern: *8-9 cups  5. Last BM: 1/31/20  6. Prior history of bowel problems:      Incontinence      Frequent diarrhea      Constipation      Hemorrhoids      Diverticulitis      Bowel Surgery       Care plan was created with patient's input and goals were agreed upon. Admission folder provided with education regarding patients diagnoses, fall prevention, skin care, and M in the box. \"Data Collection Information Summary for Patients in Inpatient Rehabilitation Facilities\" and \"Privacy Act Statement - Health Care Records\" provided. Please refer to the admission navigator for further information.

## 2020-01-31 NOTE — PROGRESS NOTES
[facility]   Nephrology progress note  1/31/2020, 12:29 PM      Pt Name:    Susan Nicolas  MRN:     [id number]     YOB: 1955  Admit Date:    1/26/2020  5:28 PM  Primary Care Physician:  Jill Galarza MD   Room number  7A-76/332-P    Chief Complaint: Nephrology following for GRANT    Subjective:  Patient seen and examined   Doing very well  Awake and alert  Denies any chest pain or any shortness of breath    Objective:  24HR INTAKE/OUTPUT:      Intake/Output Summary (Last 24 hours) at 1/31/2020 1229  Last data filed at 1/31/2020 1224  Gross per 24 hour   Intake 1528.16 ml   Output 1350 ml   Net 178.16 ml     I/O last 3 completed shifts: In: 1898.2 [P.O.:1240; I.V.:658.2]  Out: 1000 [Urine:1000]  I/O this shift:  In: -   Out: 350 [Urine:350]  Admission weight: 215 lb 8 oz (97.8 kg)  Wt Readings from Last 3 Encounters:   01/31/20 220 lb 4.8 oz (99.9 kg)   09/24/12 190 lb (86.2 kg)   07/05/12 173 lb 8 oz (78.7 kg)     Body mass index is 44.5 kg/m². Physical examination  VITALS:     Vitals:    01/31/20 1202   BP: 127/61   Pulse: 80   Resp: 18   Temp: 98.1 °F (36.7 °C)   SpO2: 92%     General Appearance: appears comfortable, no distress  Mouth/Throat: Oral mucosa moist  Neck: No JVD  Lungs: Air entry B/L, no rales, no use of accessory muscles  Heart:  S1, S2, intermittently tachycardic  GI: soft, non-tender, no guarding  Extremities: Trace LE edema      Lab Data  CBC:   Recent Labs     01/29/20  0546 01/30/20  0533   WBC 13.7* 14.9*   HGB 12.2 12.5   HCT 38.3 39.3    232     BMP:  Recent Labs     01/29/20  0546 01/30/20  0533 01/31/20  0531    140 138   K 3.7 4.5 4.0    104 104   CO2 20* 25 25   BUN 29* 26* 25*   CREATININE 1.1 1.0 1.0   GLUCOSE 108 99 114*   CALCIUM 8.4* 8.6 8.6     Hepatic:   No results for input(s): LABALBU, AST, ALT, ALB, BILITOT, ALKPHOS in the last 72 hours.       Meds:  Infusion:     Meds:    apixaban  5 mg Oral BID    diltiazem  120 mg

## 2020-01-31 NOTE — PROGRESS NOTES
[address] [facility] PHYSICAL THERAPY  DAILY NOTE  STRZ ICU STEPDOWN TELEMETRY 4K - 3D-38/394-K    Time In: 9940  Time Out: 1403  Timed Code Treatment Minutes: 8 Minutes  Minutes: 8          Date: 2020  Patient Name: Estefany Painting,  Gender:  female        MRN: [id number]  : 1955  (75 y.o.)     Referring Practitioner: Trace Siddiqi MD  Diagnosis: Acute respiratory failure with hypoxemia  Additional Pertinent Hx: Per admitting MD note on 20, patient is a 75 y.o. female who had a left TKR done at [facility] 2 days ago. She was recuperating well when she suddenly became hypoxemic, hypotensive and flipped into a-fib with RVR-HR in the 150s today. She was bolused with IVF and transferred to acute bed at [facility] for further care. Prior Level of Function:  Lives With: Alone  Type of Home: House  Home Layout: One level  Home Access: Stairs to enter with rails  Entrance Stairs - Number of Steps: 2+1 with 1 rail  Home Equipment: Cane, Reacher, Sock aid, Long-handled shoehorn, Standard walker(walker has a tray on)   Bathroom Shower/Tub: Tub/Shower unit  Bathroom Toilet: Standard  Bathroom Equipment: 3-in-1 commode  Bathroom Accessibility: Accessible    Receives Help From: Family  ADL Assistance: Independent  Homemaking Assistance: Independent  Ambulation Assistance: Independent  Transfer Assistance: Independent  Active : Yes  Additional Comments: Pt stating she was completely indep at home with ADL tasks and IADL tasks PTA. Restrictions/Precautions:  Restrictions/Precautions: General Precautions, Fall Risk, Contact Precautions    SUBJECTIVE: Pt just back to room and transport requesting assistance getting pt back to bed. PAIN: 0/10: Denies.  \"Just tired\"    OBJECTIVE:  Bed Mobility:  Sit to Supine: Minimal Assistance, Of BLEs     Transfers:  Sit to Stand: Contact Guard Assistance  Stand to [employer] Assistance    Ambulation:  Contact Guard Assistance  Distance: 5 feet x1 Surface: Level Tile  Device:Rolling Walker  Gait Deviations: Forward Flexed Posture, Decreased Weight Shift Bilaterally and Decreased Heel Strike Bilaterally    Exercise:  None. Pt wanting to rest before heading to rehab. Functional Outcome Measures: Completed  AM-PAC Inpatient Mobility without Stair Climbing Raw Score : 15  AM-PAC Inpatient without Stair Climbing T-Scale Score : 43.03    ASSESSMENT:  Assessment: Patient progressing toward established goals. and Pt tolerated well. Limited by decreased strength and decreased mobility. Activity Tolerance:  Patient tolerance of  treatment: good. Equipment Recommendations: Other: monitor for needs  Discharge Recommendations:  Patient would benefit from continued therapy after discharge, IP Rehab(or TCU)    Plan: Times per week: 6x GM/O  Times per day: Daily  Specific instructions for Next Treatment: ther ex, ther act, gait trng  Current Treatment Recommendations: Strengthening, Gait Training, Patient/Caregiver Education & Training, ROM, Functional Mobility Training, Endurance Training, Transfer Training, Safety Education & Training, Home Exercise Program, Equipment Evaluation, Education, & procurement    Patient Education  Patient Education: Plan of Care, [employer] Mobility, Transfers    Goals:  Patient goals : go home  Short term goals  Time Frame for Short term goals: at discharge  Short term goal 1: Pt to be Mod I for supine <> sit to get in/out of bed  Short term goal 2: Pt to be Mod I for sit <> stand to get up to ambulate  Short term goal 3: Pt to ambulate > 80 ft with AD with Supervision for household distances  Short term goal 4: Pt to negotiate 2 steps with 1 rail with SBA for home access  Long term goals  Time Frame for Long term goals : not set due to short ELOS    Following session, patient left in safe position with all fall risk precautions in place.

## 2020-01-31 NOTE — PROGRESS NOTES
not est d/t ELOS     Following session, patient left in safe position with all fall risk precautions in place.

## 2020-01-31 NOTE — PROGRESS NOTES
[place] [facility]      Patient - Liz Hemphill   MRN -  [id number]   [facility]   - 1955      Date of Admission -  2020  5:28 PM  Date of evaluation -  2020  Room - [facility] [address], MD Primary Care Physician - Radha Samuel MD     Problem List      Active Hospital Problems    Diagnosis Date Noted    Atrial fibrillation with RVR [facility]) [I48.91] 2020    GRANT (acute kidney injury) ([facility] [N17.9] 2020    TONEY on CPAP [G47.33, Z99.89] 2020    Acute respiratory failure with hypoxemia ([facility] [J96.01] 2020    Hypertension [I10]     Hypothyroidism [E03.9]     Seizures ([facility] [R56.9]      Reason for Consult    Atelectasis with hypoxia requiring oxygen  HPI   History Obtained From: Patient, patient's daughter, and electronic medical record. Liz Hemphill is a 75 y.o. female with a PMHx of Obesity, hypothyroidism, seizures, HLD, HTN, and S/P left total knee replacement on 20 at [facility] by [address]. Pulmonology was consulted for sudden onset hypoxia and shortness of breath that started suddenly on POD 2. Pt. Had an acute kidney injury so CTA was delayed until 2020. It showed no evidence of PE, bibasilar atalectasis, surgical changes from prior left partial pneumonectomy, and a 6 mm nodule in the residual left lung. The lung nodule should be followed up with a CTA in 1 year. Primary started on Eliquis. Plan is for her to go to TCU today. She is resting comfortably and denies SOB on 1/2 L O2 via nc, but continues to desaturate into the 80's with activity. She is having chest pain:No    Subjective/Events Past 24 hours/ROS   -O2 weaned down to 0.5 LPM sating well, Nursing tried taking her off oxygen and she was desaturating into the high 80's. -She denies SOB with physical theray or showering today, but reports some fatigue with PT.  -Reports feeling much better today.   -Denies Chest pian, abdominal pain cough, constiation, nausea or diarrhea  -Denies requiring oxygen overnight prior to admission. She was on 6 L overnight, on 10 L the night prior  -Going to [place] today. PMHx   Past Medical History      Diagnosis Date    Hyperlipemia 9/24/2012    Hyperlipidemia     Hypertension     Hypothyroidism     Morbid obesity with BMI of 45.0-49.9, adult ([facility]     TONEY on CPAP 1/30/2020    PAF (paroxysmal atrial fibrillation) (HCC)     Seizures ([facility]     last sz 20+years ago    Vitamin D deficiency       Past Surgical History        Procedure Laterality Date    APPENDECTOMY  1978    [address]AR[address] REMOVAL  2004   [address]    CHOLECYSTECTOMY  1978    TOTAL HIP ARTHROPLASTY Left 07/06/2012    TOTAL KNEE ARTHROPLASTY Left 01/24/2020     Meds    Current Medications    apixaban  5 mg Oral BID    diltiazem  120 mg Oral Daily    potassium chloride  20 mEq Oral BID WC    furosemide  20 mg Oral BID    ipratropium  0.5 mg Nebulization 4x daily    metoprolol succinate  50 mg Oral Daily    calcium acetate  667 mg Oral BID    levothyroxine  25 mcg Oral Daily    sodium chloride flush  10 mL Intravenous 2 times per day    levETIRAcetam  1,000 mg Oral BID    simvastatin  80 mg Oral Nightly     loperamide, sodium chloride flush, magnesium hydroxide, HYDROcodone 5 mg - acetaminophen **OR** HYDROcodone 5 mg - acetaminophen, morphine, ondansetron, ipratropium-albuterol  IV Drips/Infusions    Home Medications  Medications Prior to Admission: simvastatin (ZOCOR) 80 MG tablet, Take 80 mg by mouth nightly  metoprolol succinate (TOPROL XL) 50 MG extended release tablet, Take 50 mg by mouth daily  levETIRAcetam (KEPPRA) 1000 MG tablet, Take 1,000 mg by mouth 2 times daily  aspirin 81 MG EC tablet, Take 81 mg by mouth daily  ibuprofen (ADVIL;MOTRIN) 200 MG tablet, Take 400 mg by mouth every 8 hours as needed   calcium acetate (PHOSLO) 667 MG capsule, Take  by mouth 2 times daily.     phenytoin (DILANTIN) 100 MG ER capsule, Take 300 mg by mouth nightly. vitamin D (CHOLECALCIFEROL) 1000 UNIT TABS tablet, Take 1,000 Units by mouth 3 times daily. levothyroxine (SYNTHROID) 25 MCG tablet, Take 25 mcg by mouth Daily. [DISCONTINUED] rosuvastatin (CRESTOR) 20 MG tablet, Take  by mouth. 1/2 tablet nightly   [DISCONTINUED] phenobarbital (LUMINAL) 97.2 MG tablet, Take 97.2 mg by mouth daily. Diet    DIET GENERAL;  Allergies    Latex and Pcn [penicillins]  Social History     Social History     Socioeconomic History    Marital status:       Spouse name: Not on file    Number of children: Not on file    Years of education: Not on file    Highest education level: Not on file   Occupational History    Not on file   Social Needs    Financial resource strain: Not on file    Food insecurity:     Worry: Not on file     Inability: Not on file    Transportation needs:     Medical: Not on file     Non-medical: Not on file   Tobacco Use    Smoking status: Never Smoker    Smokeless tobacco: Never Used   Substance and Sexual Activity    Alcohol use: No    Drug use: No    Sexual activity: Not on file   Lifestyle    Physical activity:     Days per week: Not on file     Minutes per session: Not on file    Stress: Not on file   Relationships    Social connections:     Talks on phone: Not on file     Gets together: Not on file     Attends [employer]alian service: Not on file     Active member of club or organization: Not on file     Attends meetings of clubs or organizations: Not on file     Relationship status: Not on file    Intimate partner violence:     Fear of current or ex partner: Not on file     Emotionally abused: Not on file     Physically abused: Not on file     Forced sexual activity: Not on file   Other Topics Concern    Not on file   Social History Narrative    Not on file     Family History          Problem Relation Age of Onset    Arthritis Father     Arthritis Mother     Diabetes with physical activity, and continuing to require O2 at night. Continue to wean oxygen as tolerable at TCU.    -Will do home O2 eval at the time of discharge.  -Orders placed to obtain records from sleep physician's office.  -Patient instructed to continue to use incentive spirometer, and get up out of bed.  -Continue metanebs Q4WA and atrovent 0.5 mg nebulzer Q6  -continue PRN duonebs for SOB  -Primary rate controlling afib with toprol XL and Cardizem PO, and eliquis for anticoagulation  -GRANT. resolved, Creatinine 1.0, nephrology following  -Follow with my ( Dr. Dontae Sexton) clinic at [place] for pulmonary medicine in 1year with CT chest to be done with out contrast at least 2days before clinic visit to follow 6 mm nodule in the residual left lung. Case discussed with nurse and patient/family. Questions and concerns addressed. Electronically signed by   Tom Gilman on 1/31/2020 at 7:21 AM   Tom Gilman 1/31/2020 7:21 AM     Addendum by Dr. Sami Montanez MD:  I have seen and examined the patient independently. Face to face evaluation and examination was performed. The above evaluation and note has been reviewed. Labs and radiographs were reviewed. I Have discussed with Dr. Tom Gilman, Medical student about this patient in detail. The above assessment and plan has been reviewed. Please see my modifications mentioned below. My modifications:  Improving hypoxia. For TCU. Continue CPAP with pressure of 11cm ( Obtained from CPAP screen) during night time. Home O2 eval at the time of discharge.     Jm Martinez MD 1/31/2020 4:39 PM

## 2020-01-31 NOTE — PROGRESS NOTES
complex focus left kidney    VL DUP LOWER EXTREMITY VENOUS BILATERAL  No evidence of deep venous thrombosis in either lower extremity. NM LUNG VENT/PERFUSION (VQ)  1. Indeterminate probability for pulmonary embolism. Consider further evaluation with chest CTA. CTA chest:  1. No evidence of pulmonary embolus. 2. Bibasilar atelectasis, right greater than left. 3. Surgical changes from prior left partial pneumonectomy. 4. 6 mm nodule in the residual left lung. Consider follow-up CT in one year. Assessment and Plan:   1. Acute hypoxemic resp failure, on oxygen  2. VQ scan Indeterminate prob for PE, CTA chest neg for PE  3. A fib, on BB, cardizem   4. Elevated troponin prob ass with #3  5. GRANT, baseline Cr was 0.9, improved  6. S/p left total knee replacement  7. HTN  8. Hypothyroidism  9. TONEY on nocte CPAP  10. Seizure(h/o)     Improved, cont to wean oxygen. cont NOAC eliquis. CCB, BB  Bronchodilators,   Discharge to rehab today.       Courtney Lambert MD

## 2020-01-31 NOTE — PROGRESS NOTES
[facility]  Acute Inpatient Rehab Preadmission Assessment    Patient Name: Zaire Hemphill        MRN: [id number]    : 1955  (75 y.o.)  Gender: female     Admitted from:[address]  Initial Assessment    Date of admission to the hospital: 2020  5:28 PM  Date patient eligible for admission:2020    Primary Diagnosis: left total knee replacement    Did patient have surgery? yes - left total knee replacement    Physicians: Esteban Ricketts MD, [facility]The [facility] for clinical complications/co-morbidities:   Past Medical History:   Diagnosis Date    Hyperlipemia 2012    Hyperlipidemia     Hypertension     Hypothyroidism     Morbid obesity with BMI of 45.0-49.9, adult ([facility]     TONEY on CPAP 2020    PAF (paroxysmal atrial fibrillation) ([facility]     Seizures ([facility]     last sz 20+years ago    Vitamin D deficiency        Financial Information  Primary insurance: Medicare    Secondary Insurance:  Commercial: Company: ., Contact Information: . Has the patient had two or more falls in the past year or any fall with injury in the past year? yes    Did the patient have major surgery during the 100 days prior to admission? yes    Precautions:   falls, infections and skin  Restrictions/Precautions: General Precautions, Fall Risk, Contact Precautions    Isolation Precautions: Contact       Physiatrist: Dr. Dilip Dong    Patients Occupation: Retired  Reviewed Lab and Diagnostic reports from Current Admission: Yes    Patients Prior Functional  Level: Prior Function  Receives Help From: Family  ADL Assistance: Independent  Homemaking Assistance: Independent  Ambulation Assistance: Independent  Transfer Assistance: Independent  Additional Comments: Pt stating she was completely indep at home with ADL tasks and IADL tasks PTA.      Current functional status for upper extremity ADLs:   Contact guard assistance   Current functional status for lower extremity ADLs: contact guard assistance  Current functional status for bed, chair, wheelchair transfers: Sit to Stand: [address] to [place] [id number]    Current functional status for toilet transfers:  Sit to Stand: [address] to [place] [address]  Current functional status for locomotion:   Contact Guard Assistance  Distance: 25ft  Surface: Level Tile  Device:Rolling Walker  Gait Deviations:   Forward Flexed Posture, Slow Rayna, Decreased Step Length Bilaterally, Decreased Weight Shift Left, Lean to Right, Decreased Gait Speed, Decreased Heel Strike on Left, Decreased Terminal Knee Extension and slow guarded     Current functional status for bladder management: Modified independence    Current functional status for bowel management:Modified independence    Current functional status for comprehension: Complete independence    Current functional status for expression: Complete independence    Current functional status for social interaction: Complete independence    Current functional status for problem solving: Complete independence    Current functional status for memory: Complete independence    Expected level of Improvement in Self-Care:  Complete independence    Expected level of Improvement in Sphincter Control:  Complete independence    Expected level of Improvement in Transfers: Complete independence    Expected level of Improvement in Locomotion:  Complete independence    Expected level of Improvement in Communication and Social Cognition: Complete independence    Expected length of time to achieve that level of improvement: 2 weeks    Current rehab issues: ADL dysfunction,bladder management,bowel management,carry over of therapy techniques, discharge planning, disease and co-morbidity management, gait/mobility dysfunction, medication management, nutrition and hydration management,Ongoing assessment of safety, Pain management, Patient and family education,

## 2020-02-01 LAB
ALBUMIN SERPL-MCNC: 2.5 G/DL (ref 3.5–5.1)
ALP BLD-CCNC: 115 U/L (ref 38–126)
ALT SERPL-CCNC: 20 U/L (ref 11–66)
ANION GAP SERPL CALCULATED.3IONS-SCNC: 16 MEQ/L (ref 8–16)
AST SERPL-CCNC: 49 U/L (ref 5–40)
BILIRUB SERPL-MCNC: 0.7 MG/DL (ref 0.3–1.2)
BUN BLDV-MCNC: 22 MG/DL (ref 7–22)
CALCIUM SERPL-MCNC: 8.8 MG/DL (ref 8.5–10.5)
CHLORIDE BLD-SCNC: 97 MEQ/L (ref 98–111)
CO2: 22 MEQ/L (ref 23–33)
CREAT SERPL-MCNC: 0.9 MG/DL (ref 0.4–1.2)
ERYTHROCYTE [DISTWIDTH] IN BLOOD BY AUTOMATED COUNT: 13.8 % (ref 11.5–14.5)
ERYTHROCYTE [DISTWIDTH] IN BLOOD BY AUTOMATED COUNT: 44.8 FL (ref 35–45)
GFR SERPL CREATININE-BSD FRML MDRD: 62 ML/MIN/1.73M2
GLUCOSE BLD-MCNC: 136 MG/DL (ref 70–108)
HCT VFR BLD CALC: 38.8 % (ref 37–47)
HEMOGLOBIN: 12.5 GM/DL (ref 12–16)
MAGNESIUM: 1.5 MG/DL (ref 1.6–2.4)
MCH RBC QN AUTO: 28.3 PG (ref 26–33)
MCHC RBC AUTO-ENTMCNC: 32.2 GM/DL (ref 32.2–35.5)
MCV RBC AUTO: 87.8 FL (ref 81–99)
PHOSPHORUS: 3.9 MG/DL (ref 2.4–4.7)
PLATELET # BLD: 319 THOU/MM3 (ref 130–400)
PMV BLD AUTO: 9.9 FL (ref 9.4–12.4)
POTASSIUM SERPL-SCNC: 4 MEQ/L (ref 3.5–5.2)
RBC # BLD: 4.42 MILL/MM3 (ref 4.2–5.4)
SODIUM BLD-SCNC: 135 MEQ/L (ref 135–145)
TOTAL PROTEIN: 6.4 G/DL (ref 6.1–8)
VITAMIN D 25-HYDROXY: 23 NG/ML (ref 30–100)
WBC # BLD: 22 THOU/MM3 (ref 4.8–10.8)

## 2020-02-01 PROCEDURE — 97110 THERAPEUTIC EXERCISES: CPT

## 2020-02-01 PROCEDURE — 80053 COMPREHEN METABOLIC PANEL: CPT

## 2020-02-01 PROCEDURE — 84100 ASSAY OF PHOSPHORUS: CPT

## 2020-02-01 PROCEDURE — 83735 ASSAY OF MAGNESIUM: CPT

## 2020-02-01 PROCEDURE — 97163 PT EVAL HIGH COMPLEX 45 MIN: CPT

## 2020-02-01 PROCEDURE — 97535 SELF CARE MNGMENT TRAINING: CPT

## 2020-02-01 PROCEDURE — 97166 OT EVAL MOD COMPLEX 45 MIN: CPT

## 2020-02-01 PROCEDURE — 2580000003 HC RX 258: Performed by: PHYSICAL MEDICINE & REHABILITATION

## 2020-02-01 PROCEDURE — 85027 COMPLETE CBC AUTOMATED: CPT

## 2020-02-01 PROCEDURE — 1180000000 HC REHAB R&B

## 2020-02-01 PROCEDURE — 6370000000 HC RX 637 (ALT 250 FOR IP): Performed by: INTERNAL MEDICINE

## 2020-02-01 PROCEDURE — 6370000000 HC RX 637 (ALT 250 FOR IP): Performed by: PHYSICAL MEDICINE & REHABILITATION

## 2020-02-01 PROCEDURE — 6360000002 HC RX W HCPCS: Performed by: PHYSICAL MEDICINE & REHABILITATION

## 2020-02-01 PROCEDURE — 97530 THERAPEUTIC ACTIVITIES: CPT

## 2020-02-01 PROCEDURE — 97116 GAIT TRAINING THERAPY: CPT

## 2020-02-01 PROCEDURE — 36415 COLL VENOUS BLD VENIPUNCTURE: CPT

## 2020-02-01 PROCEDURE — 82306 VITAMIN D 25 HYDROXY: CPT

## 2020-02-01 RX ORDER — VITAMIN B COMPLEX
5000 TABLET ORAL DAILY
Status: DISCONTINUED | OUTPATIENT
Start: 2020-02-01 | End: 2020-02-09 | Stop reason: HOSPADM

## 2020-02-01 RX ADMIN — SIMVASTATIN 80 MG: 40 TABLET, FILM COATED ORAL at 19:56

## 2020-02-01 RX ADMIN — FUROSEMIDE 20 MG: 20 TABLET ORAL at 08:19

## 2020-02-01 RX ADMIN — APIXABAN 5 MG: 5 TABLET, FILM COATED ORAL at 08:19

## 2020-02-01 RX ADMIN — CEFEPIME HYDROCHLORIDE 2 G: 2 INJECTION, POWDER, FOR SOLUTION INTRAVENOUS at 23:25

## 2020-02-01 RX ADMIN — POTASSIUM CHLORIDE 20 MEQ: 1500 TABLET, EXTENDED RELEASE ORAL at 08:20

## 2020-02-01 RX ADMIN — DOCUSATE SODIUM 100 MG: 100 CAPSULE, LIQUID FILLED ORAL at 08:21

## 2020-02-01 RX ADMIN — LEVETIRACETAM 1000 MG: 500 TABLET, FILM COATED ORAL at 19:56

## 2020-02-01 RX ADMIN — MAGNESIUM GLUCONATE 500 MG ORAL TABLET 400 MG: 500 TABLET ORAL at 12:50

## 2020-02-01 RX ADMIN — VITAMIN D, TAB 1000IU (100/BT) 5000 UNITS: 25 TAB at 12:49

## 2020-02-01 RX ADMIN — POTASSIUM CHLORIDE 20 MEQ: 1500 TABLET, EXTENDED RELEASE ORAL at 17:54

## 2020-02-01 RX ADMIN — HYDROCODONE BITARTRATE AND ACETAMINOPHEN 1 TABLET: 5; 325 TABLET ORAL at 06:18

## 2020-02-01 RX ADMIN — FUROSEMIDE 20 MG: 20 TABLET ORAL at 17:50

## 2020-02-01 RX ADMIN — ACETAMINOPHEN 650 MG: 325 TABLET ORAL at 17:54

## 2020-02-01 RX ADMIN — APIXABAN 5 MG: 5 TABLET, FILM COATED ORAL at 19:56

## 2020-02-01 RX ADMIN — LEVETIRACETAM 1000 MG: 500 TABLET, FILM COATED ORAL at 08:20

## 2020-02-01 RX ADMIN — CALCIUM ACETATE 667 MG: 667 CAPSULE ORAL at 08:22

## 2020-02-01 RX ADMIN — CALCIUM ACETATE 667 MG: 667 CAPSULE ORAL at 19:56

## 2020-02-01 RX ADMIN — LEVOTHYROXINE SODIUM 25 MCG: 25 TABLET ORAL at 06:16

## 2020-02-01 RX ADMIN — HYDROCODONE BITARTRATE AND ACETAMINOPHEN 1 TABLET: 5; 325 TABLET ORAL at 11:18

## 2020-02-01 ASSESSMENT — PAIN SCALES - GENERAL
PAINLEVEL_OUTOF10: 0
PAINLEVEL_OUTOF10: 0
PAINLEVEL_OUTOF10: 2

## 2020-02-01 ASSESSMENT — PAIN DESCRIPTION - LOCATION: LOCATION: HIP;LEG

## 2020-02-01 ASSESSMENT — ENCOUNTER SYMPTOMS
ALLERGIC/IMMUNOLOGIC NEGATIVE: 1
TROUBLE SWALLOWING: 0
SHORTNESS OF BREATH: 1
WHEEZING: 0
EYE PAIN: 0
NAUSEA: 0
DIARRHEA: 0
SORE THROAT: 0

## 2020-02-01 ASSESSMENT — PAIN DESCRIPTION - PAIN TYPE: TYPE: ACUTE PAIN

## 2020-02-01 ASSESSMENT — PAIN DESCRIPTION - ORIENTATION: ORIENTATION: LEFT

## 2020-02-01 NOTE — DISCHARGE SUMMARY
[facility]  Discharge Summary     Patient Identification:  Virginia Mullins  : 1955  Admit date: 2020  Discharge date:  2020   Attending provider:  Miriam Mejia MD        Primary care provider: Addy Ascencio DO     Discharge Diagnoses:   Primary impairment requiring rehabilitation: 8.61 Status Post Unilateral Knee Replacement     Etiologic Diagnosis that led to the condition: Osteoarthritis of the left knee    Comorbid conditions affecting rehabilitation:  1. Status post total knee arthroplasty by Dr. Flora Naranjo on 2020.  2. Gait instability. 3. Acute hypoxemic respiratory failure. 4. Healthcare associated pneumonia. 5. Antibiotic associated diarrhea. 6. Atrial fibrillation with rapid ventricular response. 7. *Acute kidney injury. 8. Excessive use of non steroidal anti inflammatory medications. 9. Leukocytosis. 10. Hypomagnesemia. 11. Hypertension. 12. Hyperlipidemia. 13. Hypothyroidism. 14. Incidental finding of complex left renal lesion. 15. Obstructive sleep apnea with use of CPAP. 16. History of seizures with the last occurring over 20 years ago. 17. History of vitamin D deficiency. 18. *Morbid obesity; BMI of 44.4. 19. Hypovitaminosis D.   20. Reactive thrombocytosis. Discharge Functional Status:    Physical therapy:  OBJECTIVE:  Bed Mobility:  Supine to Sit: Modified Independent  Scooting: Modified Independent     Transfers:  Sit to Stand: Modified Independent  Stand to Sit:Modified Independent  Car:Modified Independent, folding and un-folding RW and placing in  Backset of car and then walking along car to get into front seat.      Ambulation:  Modified Independent  Distance: 150 ft. X2   Surface: Level Tile  Device:Rolling Walker  Gait Deviations: Forward Flexed Posture, Slow Rayna, Decreased Weight Shift Left, Decreased Gait Speed and Decreased Heel Strike Bilaterally     Exercise:.   Patient rode Nu-step bike x10 minutes, L2 using all arthroplasty.  The patient had an elective left total knee arthroplasty performed on 1/24/2020 by Dr. Tony Barbour.  On postop day 2 she had acute onset of shortness of breath with hypoxia with cough; but no chest pain.  Her vitals at that time were also suggestive of atrial fibrillation with rapid ventricular response which led to her being transferred to [facility] further medical management.  A VQ scan was done which she had intermediate probability for pulmonary embolism and a subsequent CTA of the chest was negative for pulmonary embolism.  Lower limb Dopplers were also negative for DVT.   Nephrology was consulted for increased creatinine level and it was noted that the patient had been overusing nonsteroidal anti-inflammatory medications after a right total knee arthroplasty and a left total hip arthroplasty.  Patient was also seen by cardiology and placed on a heparin drip with plan for conversion to oral anticoagulants.  The patient did revert back to a normal sinus rhythm and creatinine levels did improve with the patient having an unknown baseline for her creatinine levels at this time.     On initial consultation exam the patient is resting comfortably in her hospital bed and her daughter is at bedside.  The patient has her left leg in a CPM which is set to 60 degrees and she states she is comfortable with those settings and denies any significant pain at this time.  The wound has postsurgical dressing and appears to be clean dry and intact without any noted bleeding onto the dressing.  The patient currently is on 3 L of oxygen and denies any issues with shortness of breath.  She was up to the chair today and tolerated that well.  After discussion with patient and daughter providing options of either going to the transitional care unit or the acute inpatient rehabilitation unit the patient states that she would prefer to come to the acute inpatient rehabilitation unit prior to returning back to home with assistance.     In the interim the patient has remained agreeable to coming to the acute inpatient rehabilitation unit and has been admitted here. It has not been possible to wean her off of her oxygen and she remains on 1 L which gives her an oxygen saturation of 92% at rest.  She did have a bowel movement today; but endorses having had some right upper abdominal pain. On admission she is agreeable to getting an abdominal x-ray as well as an x-ray of her chest to ascertain that there are no specific reasons that she has not been able to be weaned off of her oxygen. Over the days prior to coming to the acute inpatient rehabilitation unit she did have some low-grade temperatures ranging from 99.5 to 100.1 °F; today she had a low-grade temp of 99. The chest x-ray did show left greater than right lower lobe infiltrates with poor lung inflation. Presumptively the patient has healthcare associated pneumonia and will treat appropriately due to a white count elevation of 22,000 today. She states that she is using the CPM consistently. She did have a renal ultrasound today, ordered by nephrology with findings suggestive of possible medical renal disease. The patient was discharged with stable vital signs with exception of asymptomatic, mild hypotension; pain was well controlled at time of discharge. Last bowel movement was on 2/9 and she was tolerating a general diet without any issues. Medical course on the inpatient rehabilitation unit was notable for healthcare acquired pneumonia with significant leukocytosis, antibiotic associated diarrhea, rehabilitation of the left total knee arthroplasty, hypomagnesemia, continued treatment for acute hypoxemic respiratory failure, and vitamin D deficiency. patient initially presented with low-grade temperatures and a significant leukocytosis of 22,000 with a procalcitonin of 0.78 for which treatment was initiated for presumptive healthcare associated pneumonia with the on 2/7 due to non-usage over the last 6 days. 2. Tylenol. 3. Consider Lidoderm patches if additional basal pain control is needed. Defer for now as pain levels have been well controlled with simply Tylenol. 4. Continue with CPM.  Patient currently at 70 degrees on 2/5.  2. Gait instability. 1. PT/OT. 2. TUG 13 seconds on 2/1 and slight decrease to 18 seconds on 2/4.  60-69 years:  8.1 seconds; 70-79 years: 9.2 seconds; 80-99 years: 11.3 seconds. 3. Acute hypoxemic respiratory failure. 1. Chest x-ray on 2/1 which showed persistent diminished lung volumes with bibasilar atelectasis and/or infiltrate left greater than right. 2. Chest x-ray on 2/5 without any noted intrathoracic process. 3. Order written for incentive spirometer every 4 hours while awake. 4. DuoNeb nebulizer. Discontinued on 2/8.  5. Started cefepime for presumptive healthcare associated pneumonia.  Patient allergic to penicillins. 6. Lasix 20 mg twice daily. 1. 20 mEq of potassium twice daily. 2. Discontinued on 2/7 as patient has had no further issues with oxygenation. 4. Antibiotic associated diarrhea. 1. Imodium. 2. Culturelle twice daily with meals ordered on 2/3.  5. Atrial fibrillation with rapid ventricular response. 1. Cardizem CD. 2. Toprol-XL. 6. Acute kidney injury/Excessive use of non steroidal anti inflammatory medications. 1. Nephrology following. 2. Cr/BUN/GFR on 2/1 was 0.9/22/62 improved over prior labs. Further improvement to 0.7/29/83 on 2/3. On 2/7 mild decline to 0.9/23/62. 3. Avoid NSAIDs. 4. PhosLo. 1. Consulted with nephrology today with agreement to discontinue this medication and follow-up with nephrology after discharge. Medication discontinued on 2/4.  7. Leukocytosis. 1. WBCs 22.0 on 2/1 compared to 14.9 on 1/30.  Abnormal.    Decreased to 19.2 on 2/3 with left shift of 15.9. Further decreased to 18.2 on 2/4.  14.7 on 2/7.   WBCs further improved to 17.7 on 2/5 in the setting of missing 2 doses of cefepime secondary to losing IV access. 2. Midline placed on 2/5. Plan on continuing patient on 3 days of Omnicef after discharge. 3. Pro calcitonin 0.78 on 2/3.  4. Low-grade temp of 99 today, with source possibly being the noted infiltrate on the chest x-ray from 2/1.  8. Nutrition:  Consultation to dietician for nutritional counseling and recommendations. 1. TotP 6.4, alb 2.5, Vitamin 25OH level of 23 on 2/1/2020.  2. Cholecalciferol 5000 IU daily. 9. Electrolytes. 1. Normal on 2/1 with exception of:  2. Hypomagnesemia of 1.5 on 2/1. Magnesium improved to 2.1 on 2/3. Stable at 2.1 on 2/5. Magnesium 2.3 on 2/7. Magnesium discontinued. 10. Daily 400 mg of magnesium oxide ordered on 2/1. Starting on 2/5 will dose magnesium every other day. 11. Bladder: No issues  12. Bowel: Senna, colace, MOM  13. Prophylaxis:  DVT: Eliquis.  GI: None.     Chronic Problems:  1. Hypertension. 1. Cardizem CD. 2. Toprol-XL. 2. Hyperlipidemia. 1. Simvastatin. 3. Hypothyroidism. 1. Levothyroxine. 4. Incidental finding of complex left renal lesion. 1. Nephrology will follow up with the patient in the office for further work-up. 5. Obstructive sleep apnea with use of CPAP.    1. Fortunately  6. History of seizures with the last occurring over 20 years ago. 1. Keppra. 7. History of vitamin D deficiency. 1. Vitamin D 25-hydroxy was 23 on 2/1.  8. Morbid obesity; BMI of 44.4. The patient participated in an aggressive multidisciplinary inpatient rehabilitation program involving 3 hours per day, 5 days per week of rehabilitation. Estimated Discharge Date: 2/9   Destination: outpatient therapies  Services at Discharge:  Outpatient Physical Therapy 3x week  Is patient appropriate for an outpatient driving evaluation? no  Equipment at Discharge: CPM, RW, BSC    Hypertension management was undertaken with medication management on any patient with systolic blood pressure greater than 945 or diastolic blood    There is no acute intrathoracic process. **This report has been created using voice recognition software. It may contain minor errors which are inherent in voice recognition technology. ** Final report electronically signed by Dr Abby Up on 2/5/2020 1:01 PM     Xr Chest Standard (2 Vw)     Result Date: 1/31/2020  PROCEDURE: XR CHEST (2 VW) CLINICAL INFORMATION: hypoxia with resp distress. COMPARISON: December 26, 2020 TECHNIQUE: PA and lateral views the chest. FINDINGS: The heart remains stable. There are persistent diminished lung volumes. Minimal infrahilar atelectasis changes appear similar to prior study. Underlying infiltrate particularly at the left lower lobe is not excluded. There is no visualized effusion or pneumothorax. The exam is negative for congestive failure changes. There is no acute or suspicious pathology of the skeleton. Mild degenerative changes are noted.      1. Persistent diminished lung volumes with bibasilar atelectasis and/or infiltrate left greater than right. **This report has been created using voice recognition software. It may contain minor errors which are inherent in voice recognition technology. ** Final report electronically signed by Dr. John Mercer on 1/31/2020 10:31 PM     Xr Abdomen (kub) (single Ap View)     Result Date: 1/31/2020  PROCEDURE: XR ABDOMEN (KUB) (SINGLE AP VIEW) CLINICAL INFORMATION: RIGHT sided abdominal pain. COMPARISON: No prior study. TECHNIQUE: A supine AP view of the abdomen was obtained. FINDINGS: There is a nonobstructive gas pattern visualized. There is no free air present. Post surgical changes of cholecystectomy and left hip arthroplasty are visualized. There are degenerative changes of the skeleton.      1. No evidence of acute bowel obstruction 2. Postsurgical changes noted. 3. Degenerative skeletal changes. **This report has been created using voice recognition software.  It may contain minor errors which are inherent in voice levothyroxine (SYNTHROID) 25 MCG tablet  Take 25 mcg by mouth Daily. metoprolol succinate (TOPROL XL) 50 MG extended release tablet  Take 50 mg by mouth daily             phenytoin (DILANTIN) 100 MG ER capsule  Take 300 mg by mouth nightly. simvastatin (ZOCOR) 80 MG tablet  Take 80 mg by mouth nightly             vitamin D (CHOLECALCIFEROL) 1000 UNIT TABS tablet  Take 1,000 Units by mouth 3 times daily. Controlled substances monitoring: not applicable.      40 minutes spent preparing the patient for discharge    Spencer Baumann MD

## 2020-02-01 NOTE — PROGRESS NOTES
1x/wk 30min  Current Treatment Recommendations: Strengthening, Gait Training, Patient/Caregiver Education & Training, ROM, Functional Mobility Training, Endurance Training, Transfer Training, Safety Education & Training, Home Exercise Program, Equipment Evaluation, Education, & procurement, ADL/Self-care Training, Stair training, Balance Training    Goals:  Patient goals : go home  Short term goals  Time Frame for Short term goals: 1 week  Short term goal 1: Pt to be S for supine <> sit to get in/out of bed  Short term goal 2: Pt to be S for sit <> stand to get up to ambulate  Short term goal 3: Pt to ambulate >100 ft with RW with Supervision for household distances  Short term goal 4: Pt to negotiate 2 steps with L ascending rail at [place] [address] for home access  Short term goal 5: Pt time on TUG will improve to <=17 seconds with use of RW to decrease risk of falls  Short term goal 6: Pt AROM of L knee will improve to range from 0-95 degrees to allow for improved gait mechanics  Long term goals  Time Frame for Long term goals : 2 weeks  Long term goal 1: Pt to be mod I for supine <> sit to get in/out of bed  Long term goal 2: Pt to be mod I for sit <> stand to get up to ambulate  Long term goal 3: Pt to ambulate >100 ft with RW at mod I for household distances  Long term goal 4: Pt to negotiate 2 steps with L ascending rail at mod I for home access  Long term goal 5: Pt to perform car transfer at mod I for transport needs  Long term goal 6: Pt time on TUG will improve to <=14 seconds with use of RW to decrease risk of falls    Following session, patient left in safe position with all fall risk precautions in place.

## 2020-02-01 NOTE — PLAN OF CARE
distances requiring in home (approx 25 feet) using AD with SBA, no increase in SOB or need for rest breaks to complete simple homemaking tasks and ADLs  Short term goal 2: Pt to don/doff LB clothing including ALEJANDRA hose using LHAE or compensatory tech with min A to increase indep with self care. Short term goal 3: Pt to increase standing endurance to 3 min with SBA, and no UE support to complete simple meal prep. Short term goal 4: Pt to complete toilet hygiene with adaptive strategies and SBA to increase indep with wiping bottom during toileting routine. Long term goals  Time Frame for Long term goals : 3 weeks  Long term goal 1: Pt will complete light meal prep with Hannah to restore PLOF required for pt to return home alone. Long term goal 2: Pt will complete ADL routine including shower t/f with Hannah to increase indep with self care for return home alone. Plan of Care: Patient to be seen by occupational therapy services 90 minutes per day Monday through Friday and 30 minutes on Saturday or Sunday    Anticipated interventions may include ADL and IADL retraining, strengthening, safety education and training, patient/caregiver education and training, equipment evaluation/ training/procurement, neuromuscular reeducation, wheelchair mobility training. SPEECH THERAPY:   Goals:  N/A    CASE MANAGEMENT:  Goals:   Assist patient/family with discharge planning, patient/family counseling,  and coordination with insurance during the inpatient rehabilitation stay. Other members of the multidisciplinary rehabilitation team that will be involved in the patient's plan of care include recreational therapy, dietary, respiratory therapy, and neuropsychology.     Medical issues being managed closely and that require 24 hour availability of a physician:  Bowel/Bladder function, Wound care, Pain management, Infection protection, DVT prophylaxis, Fall precautions, Fluid/Electrolyte balance, Nutritional status, Respiratory needs and History of heart disease                                           Physician anticipated functional outcomes: Improved independence with functional measures   Estimated length of stay for this admission 14 days. Medical Prognosis: Good  Anticipated disposition: Home. The potential to achieve the above medical and rehabilitative goals is very good. This plan of care has been developed with the assistance and input of the multidisciplinary rehabilitation team.  The plan was reviewed with the patient. The patient has had the opportunity to provide input to the therapy team.    I have reviewed this Individualized Plan of Care and agree with its contents. Above documentation has been expanded, modified, adjusted to reflect the findings of my evaluations and goals for the patient.     Physician:  Tyson Lin MD

## 2020-02-01 NOTE — PLAN OF CARE
Care plan reviewed with patient and  Patient and   Problem: Falls - Risk of:  Goal: Will remain free from falls  Description  Will remain free from falls  Outcome: Ongoing  Goal: Absence of physical injury  Description  Absence of physical injury  Outcome: Ongoing  Note:   Patient verbalizes understanding of fall precautions, uses call light appropriately. Problem: Bleeding:  Goal: Will show no signs and symptoms of excessive bleeding  Description  Will show no signs and symptoms of excessive bleeding  Outcome: Ongoing  Note:   No signs or symptoms of excessive bleeding noted. Problem: Mobility - Impaired:  Goal: Mobility will improve  Description  Mobility will improve  Outcome: Ongoing  Note:   Mobility improving daily. Problem: Infection - Surgical Site:  Goal: Will show no infection signs and symptoms  Description  Will show no infection signs and symptoms  Outcome: Ongoing  Note:   No signs or symptoms of infection noted     Problem: Breathing Pattern - Ineffective:  Goal: Ability to achieve and maintain a regular respiratory rate will improve  Description  Ability to achieve and maintain a regular respiratory rate will improve  Outcome: Ongoing  Note:   Respirations easy and regular. Problem: Pain:  Goal: Pain level will decrease  Description  Pain level will decrease  Outcome: Ongoing  Goal: Control of acute pain  Description  Control of acute pain  Outcome: Ongoing  Goal: Control of chronic pain  Description  Control of chronic pain  Outcome: Ongoing  Note:   Patient satisfied with pain control. verbalize understanding of the plan of care and contribute to goal setting.

## 2020-02-01 NOTE — PROGRESS NOTES
[facility] [address]  Occupational Therapy  Daily Note  Time:   Time In: 4394  Time Out: 1358  Timed Code Treatment Minutes: 30 Minutes  Minutes: 30          Date: 2020  Patient Name: Virginia Mullins,   Gender: female      Room: [facility]/053-A  MRN: [id number]  : 1955  (75 y.o.)  Referring Practitioner: Dr. Dianelys Bangura  Diagnosis: s/p total Knee Arthroplasty, L   Additional Pertinent Hx: The patient is a 75 y.o. female who had a left TKR done at [facility] 2 days ago (2020) by Dr Flora Naranjo. She was recuperating well when she suddenly became hypoxemic, hypotensive and flipped into a-fib with RVR-HR in the 150s today. She was bolused with IVF and transferred to acute bed at [facility] for further care. Dopplers of LE Neg for DVT. VQ scan inconclusive for PE. CTA of chest negative for clot and emboli. Transfer to Rehab 2020    Restrictions/Precautions:  Restrictions/Precautions: General Precautions, Fall Risk, Contact Precautions, Weight Bearing  Left Lower Extremity Weight Bearing: Weight Bearing As Tolerated    SUBJECTIVE: pt sitting up in recliner when arrived. Pt agreeable to OT     PAIN: pt stated 1/10 pain in L knee     COGNITION: Decreased Problem Solving and Decreased Safety Awareness    ADL:   Grooming: Contact Guard Assistance. standing at sink to wash hands  Toileting: Contact Guard Assistance. able to stand and wipe turner area   Toilet Transfer: [address] to RTS . BALANCE:  Standing Balance: Contact Guard Assistance. standing at sink and at RTS    TRANSFERS:  Sit to Stand:  Contact Guard Assistance. from recliner and RTS   Stand to Sit: [address] to RTS, and recliner     FUNCTIONAL MOBILITY:  Assistive Device: Rolling Walker  Assist Level:  Contact Guard Assistance. Distance: To and from bathroom  Pt had no LOB and demo good posture throughout.        ADDITIONAL ACTIVITIES:  .Pt completed BUE strengthening exercises [id number] Hannah to increase indep with self care for return home alone. Following session, patient left in safe position with all fall risk precautions in place.

## 2020-02-01 NOTE — H&P
assist with PT. With therapy is not assed for bed mobility, contact guard assist for transfers, and Supervision and contact guard assist with balance. ROM restrictions, WB restrictions, precautions: Restrictions/Precautions: General Precautions, Fall Risk, Contact Precautions, Weight Bearing  Left Lower Extremity Weight Bearing: Weight Bearing As Tolerated    Fall Risk    Current Rehabilitation Assessments:  Physical Therapy:  OBJECTIVE:  Range of Motion:  Left Lower Extremity: Impaired - knee range: lacking 9 deg extension to 79 deg flexion     Strength:  Bilateral Lower Extremity: WFL     Bed Mobility:  Rolling to Left: Contact Guard Assistance, with verbal cues    Rolling to Right: Contact Guard Assistance, with verbal cues    Supine to Sit: Contact Guard Assistance, with verbal cues   Sit to Supine: Contact Guard Assistance, with verbal cues    Scooting: Contact Guard Assistance, with verbal cues      Transfers:  Sit to Stand: Contact Guard Assistance, cues for hand placement  Stand to Sit:Contact [employer], cues for hand placement  Car:Contact Guard Assistance, with increased time for completion, with verbal cues, self assist to advance LLE in and out of car     Ambulation:  Contact Guard Assistance, with increased time for completion  Distance: 160ft x2, short distances in gym, 10ft ramp  Surface: Level Tile and Ramp  Device:Rolling Walker  Gait Deviations: Forward Flexed Posture, Slow Rayna, Decreased Step Length Bilaterally, Decreased Weight Shift Left, Decreased Gait Speed, Decreased Heel Strike on Left and Mild Path Deviations     Stairs:  Platform:  6\" platform X 1 using Rolling Walker and [address], with verbal cues , cues for proper sequencing.     Exercise:  Patient was guided in 1 set(s) 10 reps of exercise to both lower extremities. Ankle pumps, Glut sets, Quad sets, Heelslides, Short arc quads, Hip abduction/adduction and Straight leg raises.   Exercises were completed for increased independence with functional mobility.     Functional Outcome Measures: Completed  Timed Up and Go: 20(with RW)     Normative Reference Values  60-69 years:  8.1 seconds  70-79 years: 9.2 seconds  80-99 years: 11.3 seconds     < 10 seconds is normal, a score of > 14 seconds indicates high fall risk     Occupational Therapy:  Cognition/Orientation:  Overall Orientation Status: Within Normal Limits  Overall Cognitive Status: WNL     ADL's:  Feeding: Setup  Grooming: Contact guard assistance(standing sinkside for oral care)  UE Bathing: Supervision, Setup  LE Bathing: Moderate assistance(assist for bottom, B lower legs and feet)  UE Dressing: Minimal assistance(adjusting shirt)  LE Dressing: Moderate assistance(assist with socks, and threading pants/briefs)  Toileting: Minimal assistance     Functional Mobility:     Functional Mobility  Functional - Mobility Device: Rolling Walker  Activity: To/from bathroom, Other  Assist Level: Contact guard assistance  Functional Mobility Comments: within unit, slow pace  Apparatus Needs: O2      Balance:  Balance  Sitting Balance: Supervision  Standing Balance: Contact guard assistance  Standing Balance  Time: x1-2 min x 4 trials  Activity: ADLs     Transfers:  Sit to stand: Contact guard assistance  Stand to sit: Contact guard assistance  Toilet Transfers  Toilet - Technique: Ambulating  Equipment Used: Raised toilet seat with rails  Toilet Transfer: Contact guard assistance     Upper Extremity Assessment:   LUE AROM : WFL  RUE AROM : WFL     LUE Strength  LUE Strength Comment: general deconditioning  RUE Strength  RUE Strength Comment: general deconditioning    Activity Tolerance: Patient Tolerated treatment well     Assessment:  Assessment: Pt s/p TKR having respiratory issues and going into Afib after. Pt exhibiting deficits detailed below, requiring additional OT intervention to restore independent PLOF.  Pt now requiring assist for ADLs, IADLs, mobility, t/fs, and standing balance. Pt with significant increase in burden of care. Without skilled OT intervention pt at risk for functional decline, increased falls, and readmission to hospital.     Performance deficits / Impairments: Decreased strength, Decreased endurance, Decreased ADL status, Decreased safe awareness, Decreased balance  Prognosis: Good     Treatment Initiated: Treatment and education initiated within context of evaluation. Evaluation time included review of current medical information, gathering information related to past medical, social and functional history, completion of standardized testing, formal and informal observation of tasks, assessment of data and development of plan of care and goals.   Treatment time included skilled education and facilitation of tasks to increase safety and independence with ADL's for improved functional independence and quality of life.     Discharge Recommendations:  Home with assist PRN, Patient would benefit from continued therapy after discharge    Speech Therapy:  Not applicable    Past Medical History:      Diagnosis Date    Hyperlipemia 9/24/2012    Hyperlipidemia     Hypertension     Hypothyroidism     Morbid obesity with BMI of 45.0-49.9, adult ([facility]     TONEY on CPAP 1/30/2020    PAF (paroxysmal atrial fibrillation) (HCC)     Seizures ([facility]     last sz 20+years ago    Vitamin D deficiency      Primary care provider: Adrianna Iglesias MD     Past Surgical History:      Procedure Laterality Date    APPENDECTOMY  1978    CATARACT REMOVAL  2004    [address]    TOTAL HIP ARTHROPLASTY Left 07/06/2012    TOTAL KNEE ARTHROPLASTY Left 01/24/2020     Allergies:    Latex and Pcn [penicillins]    Current Medications:    Current Facility-Administered Medications: magnesium oxide (MAG-OX) tablet 400 mg, 400 mg, Oral, Daily  Vitamin D (CHOLECALCIFEROL) tablet 5,000 Units, 5,000 Units, Oral, Daily  magnesium hydroxide (MILK OF Bilirubin 0.7 0.3 - 1.2 mg/dL    ALT 20 11 - 66 U/L   Vitamin D 25 Hydroxy    Collection Time: 02/01/20  9:37 AM   Result Value Ref Range    Vit D, 25-Hydroxy 23 (L) 30 - 100 ng/ml   Magnesium    Collection Time: 02/01/20  9:37 AM   Result Value Ref Range    Magnesium 1.5 (L) 1.6 - 2.4 mg/dL   Phosphorus    Collection Time: 02/01/20  9:37 AM   Result Value Ref Range    Phosphorus 3.9 2.4 - 4.7 mg/dL   Anion Gap    Collection Time: 02/01/20  9:37 AM   Result Value Ref Range    Anion Gap 16.0 8.0 - 16.0 meq/L   Glomerular Filtration Rate, Estimated    Collection Time: 02/01/20  9:37 AM   Result Value Ref Range    Est, Glom Filt Rate 62 (A) ml/min/1.73m2      No results found for: LABA1C  No results found for: EAG  Recent Labs     02/01/20  0937 01/30/20  0533 01/29/20  0546   WBC 22.0* 14.9* 13.7*   HGB 12.5 12.5 12.2   HCT 38.8 39.3 38.3   MCV 87.8 90.8 91.4    232 219     Xr Chest Standard (2 Vw)    Result Date: 1/31/2020  PROCEDURE: XR CHEST (2 VW) CLINICAL INFORMATION: hypoxia with resp distress. COMPARISON: December 26, 2020 TECHNIQUE: PA and lateral views the chest. FINDINGS: The heart remains stable. There are persistent diminished lung volumes. Minimal infrahilar atelectasis changes appear similar to prior study. Underlying infiltrate particularly at the left lower lobe is not excluded. There is no visualized effusion or pneumothorax. The exam is negative for congestive failure changes. There is no acute or suspicious pathology of the skeleton. Mild degenerative changes are noted. 1. Persistent diminished lung volumes with bibasilar atelectasis and/or infiltrate left greater than right. **This report has been created using voice recognition software. It may contain minor errors which are inherent in voice recognition technology. ** Final report electronically signed by Dr. Fatmata Eller on 1/31/2020 10:31 PM    Xr Abdomen (kub) (single Ap View)    Result Date: 1/31/2020  PROCEDURE: XR ABDOMEN (KUB) (SINGLE AP VIEW) CLINICAL INFORMATION: RIGHT sided abdominal pain. COMPARISON: No prior study. TECHNIQUE: A supine AP view of the abdomen was obtained. FINDINGS: There is a nonobstructive gas pattern visualized. There is no free air present. Post surgical changes of cholecystectomy and left hip arthroplasty are visualized. There are degenerative changes of the skeleton. 1. No evidence of acute bowel obstruction 2. Postsurgical changes noted. 3. Degenerative skeletal changes. **This report has been created using voice recognition software. It may contain minor errors which are inherent in voice recognition technology. ** Final report electronically signed by Dr. Gordo Lomeli on 1/31/2020 10:27 PM    Us Renal Limited    Result Date: 1/31/2020  PROCEDURE: US RENAL LIMITED CLINICAL INFORMATION: Hydronephrosis TECHNIQUE: Ultrasound of the kidneys and urinary bladder was performed. Grayscale and color images were obtained. COMPARISON: Renal ultrasound 1/27/2020 FINDINGS: The right kidney measures 10.1 x 5.6 x 5.3 cm and left kidney measures 9.7 x 5.2 x 5.2 cm. There is mild cortical thinning on the right side. Mild left-sided hydronephrosis is not significantly changed. No renal calcifications or masses are seen. Color Doppler demonstrates expected wave forms in the bilateral renal arteries. The arcuate resistive indices are mildly elevated at 0.8 bilaterally. The urinary bladder is unremarkable. There is no postvoid residual urine in the bladder. 1. Mild right-sided renal cortical thinning. 2. Mild left-sided hydronephrosis. 3. Mildly elevated bilateral arcuate resistive indices of uncertain clinical significance but possibly related to medical renal disease. 4. Unremarkable urinary bladder. Final report electronically signed by Dr. Marcelina Wilson on 1/31/2020 2:14 PM    Impression:  1. Status post total knee arthroplasty by Dr. Ricky Torres on 1/24/2020.  2. Gait instability.   3. Acute hypoxemic respiratory failure. 4. Healthcare associated pneumonia. 5. Atrial fibrillation with rapid ventricular response. 6. Acute kidney injury. 7. Excessive use of non steroidal anti inflammatory medications. 8. Leukocytosis. 9. Hypomagnesemia. 10. Hypertension. 11. Hyperlipidemia. 12. Hypothyroidism. 13. Incidental finding of complex left renal lesion. 14. Obstructive sleep apnea with use of CPAP. 15. History of seizures with the last occurring over 20 years ago. 16. History of vitamin D deficiency. 17. Morbid obesity; BMI of 44.4. 18. Hypovitaminosis D. Plan:   The patient demonstrates good potential to participate in an inpatient rehabilitation program involving at least 3 hours per day, 5 days per week of intensive rehabilitation. Rehabilitation services will include PT and OT/RT in order to improve functional status prior to discharge. Family education and training will be completed. Equipment evaluations and recommendations will be completed as appropriate. Medical management: Per primary team and Dr. Cami Barbour. Consultants: Internal Medicine, Nephrology, Cardiology, Orthopedic Surgery, Physical Medicine    Narcotic usage: Norco last 24 hour usage 15 mg. Last BM:  Stool Amount: Small (01/31/20 1948)    Acute/Rehabilitation Problems:  1. Status post total knee arthroplasty by Dr. Narinder Monson on 1/24/2020.  1. Continue using CPM as directed by orthopedic surgeon. 2. Wound care as directed. 3. Pain control. 1. Norco.  2. Tylenol. 3. Consider Lidoderm patches if additional basal pain control is needed  2. Gait instability. 1. PT/OT. 3. Acute hypoxemic respiratory failure. 1. Chest x-ray ordered on 2/1 which showed persistent diminished lung volumes with bibasilar atelectasis and/or infiltrate left greater than right. 2. Order written for incentive spirometer every 4 hours while awake. 3. DuoNeb nebulizer. 4. Will start cefepime for presumptive healthcare associated pneumonia.   Patient allergic to managed by the physicians and requiring 24 hour rehabilitation nursing care during this stay include pain control, wound care, management of acute kidney injury, electrolyte management, atrial fibrillation with RVR. The domains of functional impairment present in this patient which will require an intensive and interdisciplinary rehabilitation environment include self care, mobility, bowel/bladder management, pain management and safety. Estimated length of stay for this admission 12 days. Anticipated disposition: Home. The potential to achieve that is good. The post admission physician evaluation (GABRIEL) is consistent with the pre-admission assessment. See above findings to reflect the elements required in the GABRIEL. Patient's admitting condition is consistent with the findings of the preadmission assessment by the rehabilitation admissions coordinator.     Elmer Kong MD

## 2020-02-01 NOTE — PROGRESS NOTES
[address] [address]  INPATIENT PHYSICAL THERAPY  DAILY NOTE  [facility] - [id number]  Time In: 1400  Time Out: 1430  Timed Code Treatment Minutes: 30 Minutes  Minutes: 30          Date: 2020  Patient Name: Varsha Espinoza,  Gender:  female        MRN: [id number]  : 1955  (75 y.o.)     Referring Practitioner: KIM Noyola MD  Diagnosis: S/P total knee arthroplasty, left  Additional Pertinent Hx: Per admitting MD note on 20, patient is a 75 y.o. female who had a left TKR done at [facility] 2 days ago. She was recuperating well when she suddenly became hypoxemic, hypotensive and flipped into a-fib with RVR-HR in the 150s today. She was bolused with IVF and transferred to acute bed at [facility] for further care. To [facility] on      Prior Level of Function:  Lives With: Alone  Type of Home: House  Home Layout: One level  Home Access: Stairs to enter with rails  Entrance Stairs - Number of Steps: 2+1  Entrance Stairs - Rails: Left  Home Equipment: [address], Reacher, Sock aid, Long-handled shoehorn, Standard walker   Bathroom Shower/Tub: Tub/Shower unit  Bathroom Toilet: Standard  Bathroom Equipment: 3-in-1 commode    Receives Help From: Family  ADL Assistance: Independent  Homemaking Assistance: Independent  Homemaking Responsibilities: Yes  Ambulation Assistance: Independent  Transfer Assistance: Independent  Active : Yes  IADL Comments: 3 children live nearby and able to check in on patient and assist as needed. Neighbor Tesha Arroyo) will be able to help at discharge as well. (Daughter is a BOOTH). Additional Comments: Pt stating she was completely indep at home with ADL tasks and IADL tasks PTA, no AD PTA    Restrictions/Precautions:  Restrictions/Precautions: General Precautions, Fall Risk, Contact Precautions, Weight Bearing  Left Lower Extremity Weight Bearing: Weight Bearing As Tolerated    SUBJECTIVE: Pt is pleasant and cooperative. Motivated.      PAIN: 2/10- left knee OBJECTIVE:    Transfers:  Sit to Stand: Contact Guard Assistance, X 1  Stand to [facility], X 1    Ambulation:  Contact Guard Assistance, X 1  Distance: ~ 150 feet x 1 and 15 feet x 1   Surface: Level Tile  Device:Rolling Walker  Gait Deviations: Forward Flexed Posture and Slow Rayna    Stairs:  Contact Guard Assistance, X 1  Number of Steps: 3  Height: 6\" step with One Handrail   Bilateral hands on left rail       Exercise:  Patient was guided in 10 reps of exercise to both lower extremities. Ankle pumps, Glut sets and Quad sets. Exercises were completed for increased independence with functional mobility. Functional Outcome Measures: Completed      ASSESSMENT:  Assessment: Patient progressing toward established goals. Activity Tolerance:  Patient tolerance of  treatment: good.      Equipment Recommendations:Equipment Needed: Yes(RW, has standard)  Discharge Recommendations:  Continue to assess pending progress    Plan: Times per week: 5x/wk 90min, 1x/wk 30min  Current Treatment Recommendations: Strengthening, Gait Training, Patient/Caregiver Education & Training, ROM, Functional Mobility Training, Endurance Training, Transfer Training, Safety Education & Training, Home Exercise Program, Equipment Evaluation, Education, & procurement, ADL/Self-care Training, Stair training, Balance Training    Patient Education  Patient Education: Stairs    Goals:  Patient goals : go home  Short term goals  Time Frame for Short term goals: 1 week  Short term goal 1: Pt to be S for supine <> sit to get in/out of bed  Short term goal 2: Pt to be S for sit <> stand to get up to ambulate  Short term goal 3: Pt to ambulate >100 ft with RW with Supervision for household distances  Short term goal 4: Pt to negotiate 2 steps with L ascending rail at [facility] for home access  Short term goal 5: Pt time on TUG will improve to <=17 seconds with use of RW to decrease risk of falls  Short term goal 6: Pt AROM of L knee will improve to range from 0-95 degrees to allow for improved gait mechanics  Long term goals  Time Frame for Long term goals : 2 weeks  Long term goal 1: Pt to be mod I for supine <> sit to get in/out of bed  Long term goal 2: Pt to be mod I for sit <> stand to get up to ambulate  Long term goal 3: Pt to ambulate >100 ft with RW at [facility] for household distances  Long term goal 4: Pt to negotiate 2 steps with L ascending rail at [facility] for home access  Long term goal 5: Pt to perform car transfer at [facility] for transport needs  Long term goal 6: Pt time on TUG will improve to <=14 seconds with use of RW to decrease risk of falls    Following session, patient left in safe position with all fall risk precautions in place.

## 2020-02-01 NOTE — PROGRESS NOTES
[address]  EVALUATION    Time:   Time In: 0830  Time Out: 0930  Timed Code Treatment Minutes: 39 Minutes  Minutes: 60          Date: 2020  Patient Name: Mari Long,   Gender: female      MRN: [id number]  : 1955  (75 y.o.)  Referring Practitioner: Dr. Shell Nicholas  Diagnosis: s/p total Knee Arthroplasty, L   Additional Pertinent Hx: The patient is a 75 y.o. female who had a left TKR done at [facility] 2 days ago (2020) by Dr Hill Staton. She was recuperating well when she suddenly became hypoxemic, hypotensive and flipped into a-fib with RVR-HR in the 150s today. She was bolused with IVF and transferred to acute bed at [facility] for further care. Dopplers of LE Neg for DVT. VQ scan inconclusive for PE. CTA of chest negative for clot and emboli. Transfer to Rehab 2020    Restrictions/Precautions:  Restrictions/Precautions: General Precautions, Fall Risk, Contact Precautions, Weight Bearing  Left Lower Extremity Weight Bearing: Weight Bearing As Tolerated    Subjective  Chart Reviewed: Yes, Orders, History and Physical, Imaging, Progress Notes, Previous Admission  Patient assessed for rehabilitation services?: Yes  Family / Caregiver Present: No    Subjective: Pleasant and cooperative    Pain:  Pain Assessment  Patient Currently in Pain: Yes  Pain Assessment: 0-10  Pain Level: 2  Pain Type: Acute pain  Pain Location: Hip;Leg  Pain Orientation: Left    Social/Functional History:  Lives With: Alone  Type of Home: House  Home Layout: One level  Home Access: Stairs to enter with rails  Entrance Stairs - Number of Steps: 2+1 with 1 rail  Home Equipment: Cane, Reacher, Sock aid, Long-handled shoehorn, Standard walker   Bathroom Shower/Tub: Tub/Shower unit  Bathroom Toilet: Standard  Bathroom Equipment: 3-in-1 commode  IADL Comments: 3 children live nearby and able to check in on patient and assist as needed.  Neighbor Ian Byrd) will

## 2020-02-02 LAB
BASOPHILS # BLD: 0.3 %
BASOPHILS ABSOLUTE: 0.1 THOU/MM3 (ref 0–0.1)
EOSINOPHIL # BLD: 0.6 %
EOSINOPHILS ABSOLUTE: 0.1 THOU/MM3 (ref 0–0.4)
ERYTHROCYTE [DISTWIDTH] IN BLOOD BY AUTOMATED COUNT: 13.9 % (ref 11.5–14.5)
ERYTHROCYTE [DISTWIDTH] IN BLOOD BY AUTOMATED COUNT: 45.6 FL (ref 35–45)
HCT VFR BLD CALC: 35 % (ref 37–47)
HEMOGLOBIN: 11.2 GM/DL (ref 12–16)
IMMATURE GRANS (ABS): 0.81 THOU/MM3 (ref 0–0.07)
IMMATURE GRANULOCYTES: 3.5 %
LYMPHOCYTES # BLD: 6.8 %
LYMPHOCYTES ABSOLUTE: 1.6 THOU/MM3 (ref 1–4.8)
MCH RBC QN AUTO: 28.5 PG (ref 26–33)
MCHC RBC AUTO-ENTMCNC: 32 GM/DL (ref 32.2–35.5)
MCV RBC AUTO: 89.1 FL (ref 81–99)
MONOCYTES # BLD: 4 %
MONOCYTES ABSOLUTE: 0.9 THOU/MM3 (ref 0.4–1.3)
NUCLEATED RED BLOOD CELLS: 0 /100 WBC
PLATELET # BLD: 314 THOU/MM3 (ref 130–400)
PLATELET ESTIMATE: ADEQUATE
PMV BLD AUTO: 9.8 FL (ref 9.4–12.4)
RBC # BLD: 3.93 MILL/MM3 (ref 4.2–5.4)
SCAN OF BLOOD SMEAR: NORMAL
SEG NEUTROPHILS: 84.8 %
SEGMENTED NEUTROPHILS ABSOLUTE COUNT: 19.5 THOU/MM3 (ref 1.8–7.7)
TOXIC GRANULATION: PRESENT
WBC # BLD: 23 THOU/MM3 (ref 4.8–10.8)

## 2020-02-02 PROCEDURE — 6370000000 HC RX 637 (ALT 250 FOR IP): Performed by: PHYSICAL MEDICINE & REHABILITATION

## 2020-02-02 PROCEDURE — 6360000002 HC RX W HCPCS: Performed by: INTERNAL MEDICINE

## 2020-02-02 PROCEDURE — 6370000000 HC RX 637 (ALT 250 FOR IP): Performed by: INTERNAL MEDICINE

## 2020-02-02 PROCEDURE — 94760 N-INVAS EAR/PLS OXIMETRY 1: CPT

## 2020-02-02 PROCEDURE — 2580000003 HC RX 258: Performed by: INTERNAL MEDICINE

## 2020-02-02 PROCEDURE — 87040 BLOOD CULTURE FOR BACTERIA: CPT

## 2020-02-02 PROCEDURE — 36415 COLL VENOUS BLD VENIPUNCTURE: CPT

## 2020-02-02 PROCEDURE — 1180000000 HC REHAB R&B

## 2020-02-02 PROCEDURE — 2580000003 HC RX 258: Performed by: PHYSICAL MEDICINE & REHABILITATION

## 2020-02-02 PROCEDURE — 6360000002 HC RX W HCPCS: Performed by: PHYSICAL MEDICINE & REHABILITATION

## 2020-02-02 PROCEDURE — 85025 COMPLETE CBC W/AUTO DIFF WBC: CPT

## 2020-02-02 RX ORDER — FUROSEMIDE 20 MG/1
20 TABLET ORAL ONCE
Status: COMPLETED | OUTPATIENT
Start: 2020-02-02 | End: 2020-02-02

## 2020-02-02 RX ADMIN — FUROSEMIDE 20 MG: 20 TABLET ORAL at 08:45

## 2020-02-02 RX ADMIN — POTASSIUM CHLORIDE 20 MEQ: 1500 TABLET, EXTENDED RELEASE ORAL at 17:09

## 2020-02-02 RX ADMIN — VITAMIN D, TAB 1000IU (100/BT) 5000 UNITS: 25 TAB at 08:47

## 2020-02-02 RX ADMIN — LEVETIRACETAM 1000 MG: 500 TABLET, FILM COATED ORAL at 08:45

## 2020-02-02 RX ADMIN — MAGNESIUM SULFATE HEPTAHYDRATE 1 G: 500 INJECTION, SOLUTION INTRAMUSCULAR; INTRAVENOUS at 11:31

## 2020-02-02 RX ADMIN — CALCIUM ACETATE 667 MG: 667 CAPSULE ORAL at 20:25

## 2020-02-02 RX ADMIN — CEFEPIME 2 G: 2 INJECTION, POWDER, FOR SOLUTION INTRAMUSCULAR; INTRAVENOUS at 09:58

## 2020-02-02 RX ADMIN — LEVOTHYROXINE SODIUM 25 MCG: 25 TABLET ORAL at 06:05

## 2020-02-02 RX ADMIN — APIXABAN 5 MG: 5 TABLET, FILM COATED ORAL at 08:45

## 2020-02-02 RX ADMIN — ACETAMINOPHEN 650 MG: 325 TABLET ORAL at 08:43

## 2020-02-02 RX ADMIN — METOPROLOL SUCCINATE 50 MG: 50 TABLET, EXTENDED RELEASE ORAL at 08:44

## 2020-02-02 RX ADMIN — ACETAMINOPHEN 650 MG: 325 TABLET ORAL at 16:13

## 2020-02-02 RX ADMIN — FUROSEMIDE 20 MG: 20 TABLET ORAL at 17:07

## 2020-02-02 RX ADMIN — DILTIAZEM HYDROCHLORIDE 120 MG: 120 CAPSULE, COATED, EXTENDED RELEASE ORAL at 08:45

## 2020-02-02 RX ADMIN — CEFEPIME 2 G: 2 INJECTION, POWDER, FOR SOLUTION INTRAMUSCULAR; INTRAVENOUS at 18:25

## 2020-02-02 RX ADMIN — CALCIUM ACETATE 667 MG: 667 CAPSULE ORAL at 08:45

## 2020-02-02 RX ADMIN — POTASSIUM CHLORIDE 20 MEQ: 1500 TABLET, EXTENDED RELEASE ORAL at 08:44

## 2020-02-02 RX ADMIN — ACETAMINOPHEN 650 MG: 325 TABLET ORAL at 20:26

## 2020-02-02 RX ADMIN — SIMVASTATIN 80 MG: 40 TABLET, FILM COATED ORAL at 20:28

## 2020-02-02 RX ADMIN — APIXABAN 5 MG: 5 TABLET, FILM COATED ORAL at 20:25

## 2020-02-02 RX ADMIN — LEVETIRACETAM 1000 MG: 500 TABLET, FILM COATED ORAL at 20:25

## 2020-02-02 RX ADMIN — MAGNESIUM GLUCONATE 500 MG ORAL TABLET 400 MG: 500 TABLET ORAL at 08:44

## 2020-02-02 ASSESSMENT — PAIN DESCRIPTION - LOCATION
LOCATION: LEG
LOCATION: KNEE

## 2020-02-02 ASSESSMENT — PAIN DESCRIPTION - ORIENTATION
ORIENTATION: LEFT
ORIENTATION: LEFT

## 2020-02-02 ASSESSMENT — PAIN DESCRIPTION - FREQUENCY
FREQUENCY: INTERMITTENT
FREQUENCY: INTERMITTENT

## 2020-02-02 ASSESSMENT — PAIN DESCRIPTION - ONSET
ONSET: ON-GOING
ONSET: ON-GOING

## 2020-02-02 ASSESSMENT — PAIN DESCRIPTION - DESCRIPTORS
DESCRIPTORS: ACHING
DESCRIPTORS: ACHING

## 2020-02-02 ASSESSMENT — PAIN DESCRIPTION - PAIN TYPE
TYPE: SURGICAL PAIN
TYPE: SURGICAL PAIN

## 2020-02-02 ASSESSMENT — PAIN SCALES - GENERAL
PAINLEVEL_OUTOF10: 2

## 2020-02-02 ASSESSMENT — PAIN DESCRIPTION - PROGRESSION
CLINICAL_PROGRESSION: GRADUALLY IMPROVING
CLINICAL_PROGRESSION: GRADUALLY IMPROVING

## 2020-02-02 ASSESSMENT — PAIN DESCRIPTION - DIRECTION: RADIATING_TOWARDS: NO RADIATING

## 2020-02-02 NOTE — PLAN OF CARE
Care plan reviewed with patient and daughter. Patient and daughter   Problem: Falls - Risk of:  Goal: Will remain free from falls  Description  Will remain free from falls  2/2/2020 1344 by Loki Garner RN  Outcome: Ongoing  2/2/2020 0503 by Liliane Thomas RN  Outcome: Ongoing  Goal: Absence of physical injury  Description  Absence of physical injury  2/2/2020 1344 by Loki Garner RN  Outcome: Ongoing  Note:   Patient verbalizes understanding of fall precautions, uses call light appropriately. 2/2/2020 0503 by Liliane Thomas RN  Outcome: Ongoing     Problem: Bleeding:  Goal: Will show no signs and symptoms of excessive bleeding  Description  Will show no signs and symptoms of excessive bleeding  Outcome: Ongoing  Note:   No signs or symptoms of excessive bleeding noted. Problem: Mobility - Impaired:  Goal: Mobility will improve  Description  Mobility will improve  Outcome: Ongoing  Note:   Mobility improving     Problem: Infection - Surgical Site:  Goal: Will show no infection signs and symptoms  Description  Will show no infection signs and symptoms  Outcome: Ongoing  Note:   No signs or symptoms of infection noted. Problem: Breathing Pattern - Ineffective:  Goal: Ability to achieve and maintain a regular respiratory rate will improve  Description  Ability to achieve and maintain a regular respiratory rate will improve  Outcome: Ongoing  Note:   Breathing pattern and pulse ox improving, average 94 % on room air. Problem: Pain:  Goal: Pain level will decrease  Description  Pain level will decrease  Outcome: Ongoing  Goal: Control of acute pain  Description  Control of acute pain  Outcome: Ongoing  Goal: Control of chronic pain  Description  Control of chronic pain  Outcome: Ongoing  Note:   Patient satisfied with pain control. verbalize understanding of the plan of care and contribute to goal setting.

## 2020-02-03 LAB
ANION GAP SERPL CALCULATED.3IONS-SCNC: 14 MEQ/L (ref 8–16)
BACTERIA: ABNORMAL
BASOPHILS # BLD: 0.4 %
BASOPHILS ABSOLUTE: 0.1 THOU/MM3 (ref 0–0.1)
BILIRUBIN URINE: NEGATIVE
BLOOD, URINE: NEGATIVE
BUN BLDV-MCNC: 29 MG/DL (ref 7–22)
CALCIUM SERPL-MCNC: 8.8 MG/DL (ref 8.5–10.5)
CASTS: ABNORMAL /LPF
CASTS: ABNORMAL /LPF
CHARACTER, URINE: CLEAR
CHLORIDE BLD-SCNC: 102 MEQ/L (ref 98–111)
CO2: 23 MEQ/L (ref 23–33)
COLOR: YELLOW
CREAT SERPL-MCNC: 0.7 MG/DL (ref 0.4–1.2)
CRYSTALS: ABNORMAL
EOSINOPHIL # BLD: 1.7 %
EOSINOPHILS ABSOLUTE: 0.3 THOU/MM3 (ref 0–0.4)
EPITHELIAL CELLS, UA: ABNORMAL /HPF
ERYTHROCYTE [DISTWIDTH] IN BLOOD BY AUTOMATED COUNT: 14.1 % (ref 11.5–14.5)
ERYTHROCYTE [DISTWIDTH] IN BLOOD BY AUTOMATED COUNT: 47.8 FL (ref 35–45)
GFR SERPL CREATININE-BSD FRML MDRD: 83 ML/MIN/1.73M2
GLUCOSE BLD-MCNC: 101 MG/DL (ref 70–108)
GLUCOSE, URINE: NEGATIVE MG/DL
HCT VFR BLD CALC: 33.2 % (ref 37–47)
HEMOGLOBIN: 10.1 GM/DL (ref 12–16)
IMMATURE GRANS (ABS): 0.55 THOU/MM3 (ref 0–0.07)
IMMATURE GRANULOCYTES: 2.9 %
KETONES, URINE: NEGATIVE
LEUKOCYTE EST, POC: NEGATIVE
LYMPHOCYTES # BLD: 7.9 %
LYMPHOCYTES ABSOLUTE: 1.5 THOU/MM3 (ref 1–4.8)
MAGNESIUM: 2.1 MG/DL (ref 1.6–2.4)
MCH RBC QN AUTO: 28.1 PG (ref 26–33)
MCHC RBC AUTO-ENTMCNC: 30.4 GM/DL (ref 32.2–35.5)
MCV RBC AUTO: 92.5 FL (ref 81–99)
MISCELLANEOUS LAB TEST RESULT: ABNORMAL
MONOCYTES # BLD: 4.3 %
MONOCYTES ABSOLUTE: 0.8 THOU/MM3 (ref 0.4–1.3)
NITRITE, URINE: NEGATIVE
NUCLEATED RED BLOOD CELLS: 0 /100 WBC
PH UA: 6.5 (ref 5–9)
PLATELET # BLD: 340 THOU/MM3 (ref 130–400)
PMV BLD AUTO: 9.8 FL (ref 9.4–12.4)
POTASSIUM SERPL-SCNC: 4.7 MEQ/L (ref 3.5–5.2)
PROCALCITONIN: 0.78 NG/ML (ref 0.01–0.09)
PROTEIN UA: ABNORMAL MG/DL
RBC # BLD: 3.59 MILL/MM3 (ref 4.2–5.4)
RBC URINE: ABNORMAL /HPF
RENAL EPITHELIAL, UA: ABNORMAL
SEG NEUTROPHILS: 82.8 %
SEGMENTED NEUTROPHILS ABSOLUTE COUNT: 15.9 THOU/MM3 (ref 1.8–7.7)
SODIUM BLD-SCNC: 139 MEQ/L (ref 135–145)
SPECIFIC GRAVITY UA: 1.02 (ref 1–1.03)
UROBILINOGEN, URINE: 0.2 EU/DL (ref 0–1)
WBC # BLD: 19.2 THOU/MM3 (ref 4.8–10.8)
WBC UA: ABNORMAL /HPF
YEAST: ABNORMAL

## 2020-02-03 PROCEDURE — 81001 URINALYSIS AUTO W/SCOPE: CPT

## 2020-02-03 PROCEDURE — 99232 SBSQ HOSP IP/OBS MODERATE 35: CPT | Performed by: INTERNAL MEDICINE

## 2020-02-03 PROCEDURE — 1180000000 HC REHAB R&B

## 2020-02-03 PROCEDURE — 97110 THERAPEUTIC EXERCISES: CPT

## 2020-02-03 PROCEDURE — 6370000000 HC RX 637 (ALT 250 FOR IP): Performed by: PHYSICAL MEDICINE & REHABILITATION

## 2020-02-03 PROCEDURE — 97530 THERAPEUTIC ACTIVITIES: CPT

## 2020-02-03 PROCEDURE — 6370000000 HC RX 637 (ALT 250 FOR IP): Performed by: INTERNAL MEDICINE

## 2020-02-03 PROCEDURE — 85025 COMPLETE CBC W/AUTO DIFF WBC: CPT

## 2020-02-03 PROCEDURE — 36415 COLL VENOUS BLD VENIPUNCTURE: CPT

## 2020-02-03 PROCEDURE — 83735 ASSAY OF MAGNESIUM: CPT

## 2020-02-03 PROCEDURE — 97535 SELF CARE MNGMENT TRAINING: CPT

## 2020-02-03 PROCEDURE — 97116 GAIT TRAINING THERAPY: CPT

## 2020-02-03 PROCEDURE — 80048 BASIC METABOLIC PNL TOTAL CA: CPT

## 2020-02-03 PROCEDURE — 6360000002 HC RX W HCPCS: Performed by: PHYSICAL MEDICINE & REHABILITATION

## 2020-02-03 PROCEDURE — 84145 PROCALCITONIN (PCT): CPT

## 2020-02-03 PROCEDURE — 2580000003 HC RX 258: Performed by: PHYSICAL MEDICINE & REHABILITATION

## 2020-02-03 RX ORDER — LACTOBACILLUS RHAMNOSUS GG 10B CELL
1 CAPSULE ORAL
Status: DISCONTINUED | OUTPATIENT
Start: 2020-02-04 | End: 2020-02-03

## 2020-02-03 RX ORDER — SODIUM CHLORIDE 0.9 % (FLUSH) 0.9 %
10 SYRINGE (ML) INJECTION 2 TIMES DAILY
Status: DISCONTINUED | OUTPATIENT
Start: 2020-02-03 | End: 2020-02-05 | Stop reason: SDUPTHER

## 2020-02-03 RX ORDER — SODIUM CHLORIDE 0.9 % (FLUSH) 0.9 %
10 SYRINGE (ML) INJECTION PRN
Status: DISCONTINUED | OUTPATIENT
Start: 2020-02-03 | End: 2020-02-05 | Stop reason: SDUPTHER

## 2020-02-03 RX ORDER — LACTOBACILLUS RHAMNOSUS GG 10B CELL
1 CAPSULE ORAL 2 TIMES DAILY WITH MEALS
Status: DISCONTINUED | OUTPATIENT
Start: 2020-02-03 | End: 2020-02-09 | Stop reason: HOSPADM

## 2020-02-03 RX ADMIN — APIXABAN 5 MG: 5 TABLET, FILM COATED ORAL at 21:25

## 2020-02-03 RX ADMIN — LEVETIRACETAM 1000 MG: 500 TABLET, FILM COATED ORAL at 21:25

## 2020-02-03 RX ADMIN — CEFEPIME 2 G: 2 INJECTION, POWDER, FOR SOLUTION INTRAMUSCULAR; INTRAVENOUS at 18:19

## 2020-02-03 RX ADMIN — METOPROLOL SUCCINATE 50 MG: 50 TABLET, EXTENDED RELEASE ORAL at 08:08

## 2020-02-03 RX ADMIN — CALCIUM ACETATE 667 MG: 667 CAPSULE ORAL at 21:25

## 2020-02-03 RX ADMIN — SODIUM CHLORIDE, PRESERVATIVE FREE 10 ML: 5 INJECTION INTRAVENOUS at 21:20

## 2020-02-03 RX ADMIN — LEVETIRACETAM 1000 MG: 500 TABLET, FILM COATED ORAL at 08:08

## 2020-02-03 RX ADMIN — ACETAMINOPHEN 650 MG: 325 TABLET ORAL at 17:30

## 2020-02-03 RX ADMIN — FUROSEMIDE 20 MG: 20 TABLET ORAL at 05:55

## 2020-02-03 RX ADMIN — Medication 1 CAPSULE: at 16:52

## 2020-02-03 RX ADMIN — ACETAMINOPHEN 650 MG: 325 TABLET ORAL at 05:55

## 2020-02-03 RX ADMIN — FUROSEMIDE 20 MG: 20 TABLET ORAL at 12:58

## 2020-02-03 RX ADMIN — CALCIUM ACETATE 667 MG: 667 CAPSULE ORAL at 08:08

## 2020-02-03 RX ADMIN — APIXABAN 5 MG: 5 TABLET, FILM COATED ORAL at 08:08

## 2020-02-03 RX ADMIN — ACETAMINOPHEN 650 MG: 325 TABLET ORAL at 13:00

## 2020-02-03 RX ADMIN — VITAMIN D, TAB 1000IU (100/BT) 5000 UNITS: 25 TAB at 08:08

## 2020-02-03 RX ADMIN — CEFEPIME 2 G: 2 INJECTION, POWDER, FOR SOLUTION INTRAMUSCULAR; INTRAVENOUS at 04:12

## 2020-02-03 RX ADMIN — LEVOTHYROXINE SODIUM 25 MCG: 25 TABLET ORAL at 05:55

## 2020-02-03 RX ADMIN — POTASSIUM CHLORIDE 20 MEQ: 1500 TABLET, EXTENDED RELEASE ORAL at 16:52

## 2020-02-03 RX ADMIN — POTASSIUM CHLORIDE 20 MEQ: 1500 TABLET, EXTENDED RELEASE ORAL at 08:07

## 2020-02-03 RX ADMIN — ACETAMINOPHEN 650 MG: 325 TABLET ORAL at 21:26

## 2020-02-03 RX ADMIN — SIMVASTATIN 80 MG: 40 TABLET, FILM COATED ORAL at 21:25

## 2020-02-03 RX ADMIN — Medication 10 ML: at 18:19

## 2020-02-03 RX ADMIN — CEFEPIME 2 G: 2 INJECTION, POWDER, FOR SOLUTION INTRAMUSCULAR; INTRAVENOUS at 10:50

## 2020-02-03 RX ADMIN — MAGNESIUM GLUCONATE 500 MG ORAL TABLET 400 MG: 500 TABLET ORAL at 08:08

## 2020-02-03 RX ADMIN — DILTIAZEM HYDROCHLORIDE 120 MG: 120 CAPSULE, COATED, EXTENDED RELEASE ORAL at 08:08

## 2020-02-03 ASSESSMENT — PAIN SCALES - GENERAL
PAINLEVEL_OUTOF10: 2
PAINLEVEL_OUTOF10: 0
PAINLEVEL_OUTOF10: 2
PAINLEVEL_OUTOF10: 0
PAINLEVEL_OUTOF10: 5
PAINLEVEL_OUTOF10: 2

## 2020-02-03 ASSESSMENT — PAIN DESCRIPTION - PAIN TYPE
TYPE: SURGICAL PAIN

## 2020-02-03 ASSESSMENT — ENCOUNTER SYMPTOMS
CHEST TIGHTNESS: 0
DIARRHEA: 1
CONSTIPATION: 0
BACK PAIN: 0
NAUSEA: 0
BLOOD IN STOOL: 0
SORE THROAT: 0
EYE PAIN: 0
SHORTNESS OF BREATH: 0
ALLERGIC/IMMUNOLOGIC NEGATIVE: 1
TROUBLE SWALLOWING: 0

## 2020-02-03 ASSESSMENT — PAIN DESCRIPTION - DESCRIPTORS
DESCRIPTORS: ACHING

## 2020-02-03 ASSESSMENT — PAIN - FUNCTIONAL ASSESSMENT
PAIN_FUNCTIONAL_ASSESSMENT: ACTIVITIES ARE NOT PREVENTED

## 2020-02-03 ASSESSMENT — PAIN DESCRIPTION - ORIENTATION
ORIENTATION: LEFT

## 2020-02-03 ASSESSMENT — PAIN DESCRIPTION - LOCATION
LOCATION: KNEE

## 2020-02-03 NOTE — PROGRESS NOTES
self care for return home alone. Following session, patient left in safe position with all fall risk precautions in place.

## 2020-02-03 NOTE — PROGRESS NOTES
goals, currently has met 1 STG during her short stay. Pt has limitations in strength, ROM, endurance and balance. Pt would continue to benefit from skilled PT intervention to address deficits and to improve functional mobility and independence upon discharge. Activity Tolerance:  Patient tolerance of  treatment: good.       Equipment Recommendations:Equipment Needed: Yes(RW, has standard)  Discharge Recommendations:  Continue to assess pending progress    Plan: Times per week: 5x/wk 90min, 1x/wk 30min  Current Treatment Recommendations: Strengthening, Gait Training, Patient/Caregiver Education & Training, ROM, Functional Mobility Training, Endurance Training, Transfer Training, Safety Education & Training, Home Exercise Program, Equipment Evaluation, Education, & procurement, ADL/Self-care Training, Stair training, Balance Training    Patient Education  Patient Education: Plan of Care, [employer] Mobility, Gait, Stairs, Car Transfers, Verbal Exercise Instruction    Goals:  Patient goals : go home  Short term goals  Time Frame for Short term goals: 1 week  Short term goal 1: Pt to be S for supine <> sit to get in/out of bed NOT MET  Short term goal 2: Pt to be S for sit <> stand to get up to ambulate NOT MET  Short term goal 3: Pt to ambulate >100 ft with RW with Supervision for household distances NOT MET  Short term goal 4: Pt to negotiate 2 steps with L ascending rail at [facility] for home access GOAL MET, SEE LTG  Short term goal 5: Pt time on TUG will improve to <=17 seconds with use of RW to decrease risk of falls NOT MET  Short term goal 6: Pt AROM of L knee will improve to range from 0-95 degrees to allow for improved gait mechanics NOT MET  Long term goals  Time Frame for Long term goals : 2 weeks  Long term goal 1: Pt to be mod I for supine <> sit to get in/out of bed  Long term goal 2: Pt to be mod I for sit <> stand to get up to ambulate  Long term goal 3: Pt to ambulate >100 ft with RW at mod I for household distances  Long term goal 4: Pt to negotiate 2 steps with L ascending rail at mod I for home access NOT MET  Long term goal 5: Pt to perform car transfer at mod I for transport needs  Long term goal 6: Pt time on TUG will improve to <=14 seconds with use of RW to decrease risk of falls    Following session, patient left in safe position with all fall risk precautions in place. Myla Mackenzie PTA  Treatment session and note completed by Myla SIDDIQUI. Assessment and goal revision of Progress Note completed by Cookie Schaeffer.

## 2020-02-03 NOTE — PLAN OF CARE
Care plan reviewed with patient and  verbalize understanding of the plan of care and contribute   Problem: Breathing Pattern - Ineffective:  Goal: Ability to achieve and maintain a regular respiratory rate will improve  Description  Ability to achieve and maintain a regular respiratory rate will improve  Outcome: Met This Shift  Note:   Pulse ox 94 without oxygen     Problem: Infection - Surgical Site:  Goal: Will show no infection signs and symptoms  Description  Will show no infection signs and symptoms  Outcome: Met This Shift  Note:   No s/s     Problem: Bleeding:  Goal: Will show no signs and symptoms of excessive bleeding  Description  Will show no signs and symptoms of excessive bleeding  Outcome: Met This Shift  Note:   No s/s     Problem: Falls - Risk of:  Goal: Absence of physical injury  Description  Absence of physical injury  Outcome: Met This Shift  Note:   No injuires     Problem: Falls - Risk of:  Goal: Will remain free from falls  Description  Will remain free from falls  Outcome: Met This Shift  Note:   No falls     Problem: Pain:  Description  Pain management should include both nonpharmacologic and pharmacologic interventions. Goal: Pain level will decrease  Description  Pain level will decrease  Outcome: Met This Shift  Note:   Pain controlled with ice and pain meds     Problem: Pain:  Description  Pain management should include both nonpharmacologic and pharmacologic interventions. Goal: Control of acute pain  Description  Control of acute pain  Outcome: Met This Shift  Note:   Controlled with ice and pain meds     Problem: Pain:  Description  Pain management should include both nonpharmacologic and pharmacologic interventions.   Goal: Control of chronic pain  Description  Control of chronic pain  Outcome: Completed     Problem: Nutrition  Goal: Optimal nutrition therapy  2/3/2020 0924 by Stef Cantor RN  Outcome: Met This Shift  Note:   adequate  2/3/2020 1468 by Anson Suárez RD,

## 2020-02-03 NOTE — PROGRESS NOTES
know if that changes. Anticipate patient would benefit from continued therapy, however, barrier to discharge includes IV antibiotics. SW reviewed with patient team conference on Tuesday, 02/04/2020, to further discuss progress and discharge recommendations. SW to follow and maintain involvement in discharge planning. PHYSICAL THERAPY   Patient progressing toward established goals, currently has met 1 STG during her short stay. Pt has limitations in strength, ROM, endurance and balance. Pt would continue to benefit from skilled PT intervention to address deficits and to improve functional mobility and independence upon discharge. Equipment Needed: Yes(RW, has standard)    SPEECH THERAPY  N/A    OCCUPATIONAL THERAPY  Pt, Scott Pedersen, has made steady gains on IP rehab but is limited due to short length of stay thus far. She currently requires contact guard A for functional transfers, basic mobility and standing during ADL and IADL tasks. She completes UB ADLs at a set-up A level and requires min A for LB ADLs, but does report comptency with LHAE if needed. Pt completed daily routines without cue's for TKA precautions and reports she feels comfortable with precautions due to a previous TKA on other LE. She lives home alone so she will be required to be MI at discharge but does have intermittent support by daughter if needed. Equipment Needed: No  Other: Pt has LHAE if needed. Pt reports daughter is buying her a shower chair and grab bars to install in shower and near toilet. RECREATIONAL THERAPY  Patient has been offered participation in recreational therapy activities and participates as able. Pt states she does her own cooking, cleaning and driving-she is a part time nurses aid at [email or web address] enjoys reading the newspaper and working word puzzles-she enjoys spending time with her family-she likes to go out to eat and [place]-she enjoys Emilie Mcconnell wrestling and is a fan of [employer]

## 2020-02-03 NOTE — DISCHARGE INSTR - COC
Continuity of Care Form    Patient Name: Daphnie Johnson   :  1955  MRN:  [id number]    Admit date:  2020  Discharge date:  20    Code Status Order: Full Code   Advance Directives:   Advance Care Flowsheet Documentation     Date/Time Healthcare Directive Type of Healthcare Directive Copy in [address] Agent's Name Healthcare Agent's Phone Number    20 [phone]  No, patient does not have an advance directive for healthcare treatment -- -- -- -- --          Admitting Physician:  Geovanna Elizondo MD  PCP: Danica Jacobson, [facility] Unit/Room#: 6E-46/12-A  Discharging Unit Phone Number: [phone]    Emergency Contact:   Extended Emergency Contact Information  Primary Emergency Contact: [address] of [address] [place] [address] Phone: [phone]  Relation: Child  Secondary Emergency Contact: [address] of [address] [place] [address] Phone: [phone]  Relation: Child    Past Surgical History:  Past Surgical History:   Procedure Laterality Date    APPENDECTOMY  1978    CATARACT REMOVAL  2004    [address] Left 2012    TOTAL KNEE ARTHROPLASTY Left 2020       Immunization History:   Immunization History   Administered Date(s) Administered    Influenza Virus Vaccine 10/26/2019       Active Problems:  Patient Active Problem List   Diagnosis Code    Seizures ([facility] R56.9    Hypertension I10    Hypothyroidism E03.9    Hyperlipemia E78.5    Acute respiratory failure with hypoxemia ([address] J96.01    Atrial fibrillation with RVR (HCC) I48.91    GRANT (acute kidney injury) ([facility] N17.9    TONEY on CPAP G47.33, Z99.89    S/P total knee arthroplasty, left G74.376       Isolation/Infection:   Isolation          Contact        Patient Infection Status     Infection Onset Added Last Indicated Last Indicated By Review Planned Expiration Resolved Resolved By    VRE 20

## 2020-02-03 NOTE — PROGRESS NOTES
Pt. awake and up for therapy at 0700. Voices slept well with no new concerns. Left knee incision approx. Without drainage. Pt.  In cpm at 0900 at 68 degrees and pt. instructed to increase as tolerated till next therapy [id number]

## 2020-02-03 NOTE — PROGRESS NOTES
Physical Medicine & Rehabilitation  Progress Note    Chief Complaint:  s/p LEFT total knee arthroplasty/HAP    Subjective: Patient denies any medication side effects from the cefepime and states that she is having some mild diarrhea. She is agreeable to starting a probiotic to help with this issue. Her current labs and prior findings on chest x-ray were discussed with the patient and her daughter. The importance of using the incentive spirometer was stressed. Patient states that her current pain level is 2/10 and she has not used any of the Norco over the last 2 days. The CPM has been tolerated at a setting of 68 degrees without issues. Neither patient nor daughter have any other questions at this time. .    Rehabilitation:   Physical Therapy:  OBJECTIVE:  Bed Mobility:  Not Tested     Transfers:  Sit to Stand: Stand By Assistance, [address], with increased time for completion  Stand to Sit:Stand By Assistance, [address], with increased time for completion     Ambulation:  Stand By Assistance, [address], with increased time for completion  Distance: 100'x1, 90'x1, 20'x1 on carpet  Surface: Level Tile and Carpet  Device:Rolling Walker  Gait Deviations:  Slow Rayna, Decreased Step Length Bilaterally, Decreased Weight Shift Left, Decreased Gait Speed, Decreased Heel Strike Bilaterally, Hip Hikes right side, Outward translation of feet, and Mild Path Deviations     Balance:  Dynamic Standing Balance: Contact Guard Assistance  Pt completed ring toss while reaching out of FRANDY ~10'. Cues for weight shifting and posture. No UE support used. One rest break needed throughout due to fatigue.     Exercise:  Not tested     Functional Outcome Measures: Not completed     ASSESSMENT:  Assessment: Patient progressing toward established goals. Activity Tolerance:  Patient tolerance of  treatment: good.       Equipment Recommendations:Equipment Needed: Yes(RW, has other LE. She lives home alone so she will be required to be MI at discharge but does have intermittent support by daughter if needed. Activity Tolerance:  Patient tolerance of  treatment: fair. Discharge Recommendations: Home with assist PRN, Patient would benefit from continued therapy after discharge  Equipment Recommendations: Equipment Needed: No  Other: Pt has LHAE if needed. Pt reports daughter is buying her a shower chair and grab bars to install in shower and near toilet. Speech Therapy:  Not applicable    Review of Systems:  Review of Systems   Constitutional: Positive for activity change and fatigue. Negative for appetite change, chills and fever. HENT: Negative for nosebleeds, sore throat and trouble swallowing. Eyes: Negative for pain. Respiratory: Negative for chest tightness and shortness of breath. Gastrointestinal: Positive for diarrhea. Negative for blood in stool, constipation and nausea. Endocrine: Negative for cold intolerance. Genitourinary: Negative for dysuria, frequency and urgency. Musculoskeletal: Positive for arthralgias and gait problem. Negative for back pain. Skin: Negative for pallor. Allergic/Immunologic: Negative. Neurological: Positive for weakness. Negative for tremors, numbness and headaches. Hematological: Negative. Psychiatric/Behavioral: Negative for confusion, decreased concentration and dysphoric mood. The patient is not nervous/anxious. All other systems reviewed and are negative. Objective:  /70   Pulse 97   Temp 97.1 °F (36.2 °C) (Oral)   Resp 18   Ht 4' 11\" (1.499 m)   Wt 218 lb (98.9 kg)   SpO2 94%   BMI 44.03 kg/m²   CURRENT VITALS:  height is 4' 11\" (1.499 m) and weight is 218 lb (98.9 kg). Her oral temperature is 97.1 °F (36.2 °C). Her blood pressure is 128/70 and her pulse is 97. Her respiration is 18 and oxygen saturation is 94%. Body mass index is 44.03 kg/m².   Temperature Range (24h):Temp: 97.1 °F (36.2 °C) Temp  Av.1 °F (36.2 °C)  Min: 97.1 °F (36.2 °C)  Max: 97.1 °F (36.2 °C)  BP Range (46Q): Systolic (76NUI), XAR:091 , Min:128 , VS     Diastolic (30QCU), GIS:33, Min:70, Max:70    Pulse Range (24h): Pulse  Av  Min: 97  Max: 97  Respiration Range (24h): Resp  Av  Min: 18  Max: 18  Current Pulse Ox (24h):  SpO2: 94 %  Pulse Ox Range (24h):  SpO2  Av %  Min: 94 %  Max: 94 %  Oxygen Amount and Delivery: O2 Flow Rate (L/min): 1 L/min    awake  Orientation:   person, place, time, situation.     Mood: within normal limits  Affect: calm  General appearance:  in no acute distress, up in bed LEFT leg in CPM   Memory:   grossly normal  Attention/Concentration: normal  Language:  normal     Cranial Nerves:  cranial nerves II-XII are grossly intact  ROM:  abnormal - LEFT TKA  Motor Exam:  Motor exam is 5 out of 5 all extremities with the exception of LEFT leg not testable while in CPM     Tone:  Normal, LEFT leg not testable  Muscle bulk: within normal limits  Sensory:  Sensory intact  Coordination:   normal  Deep Tendon Reflexes:  Reflexes are intact and symmetrical bilaterally, with LEFT patella and achilles MSR not testted     Skin: warm and dry, no rash or erythema  Peripheral vascular: Pulses: Normal upper and lower extremity pulses; Edema: 1+    Diagnostics:   Recent Results (from the past 24 hour(s))   Microscopic Urinalysis    Collection Time: 20  3:50 AM   Result Value Ref Range    Glucose, Urine NEGATIVE NEGATIVE mg/dl    Bilirubin Urine NEGATIVE NEGATIVE    Ketones, Urine NEGATIVE NEGATIVE    Specific Gravity, UA 1.016 1.002 - 1.03    Blood, Urine NEGATIVE NEGATIVE    pH, UA 6.5 5.0 - 9.0    Protein, UA TRACE (A) NEGATIVE mg/dl    Urobilinogen, Urine 0.2 0.0 - 1.0 eu/dl    Nitrite, Urine NEGATIVE NEGATIVE    Leukocytes, UA NEGATIVE NEGATIVE    Color, UA YELLOW YELLOW-STR    Character, Urine CLEAR CLR-SL.MAXIMILIANO    RBC, UA 0-2 0-2/hpf /hpf    WBC, UA NONE SEEN 0-4/hpf /hpf    Epi Cells 0-2 Problems:  1. Hypertension. 1. Lasix 20 mg twice daily. 1. 20 mEq of potassium twice daily. 2. Cardizem CD. 3. Toprol-XL. 2. Hyperlipidemia. 1. Simvastatin. 3. Hypothyroidism. 1. Levothyroxine. 4. Incidental finding of complex left renal lesion. 1. Nephrology will follow up with the patient in the office for further work-up. 5. Obstructive sleep apnea with use of CPAP. 1. Fortunately  6. History of seizures with the last occurring over 20 years ago. 1. Keppra. 7. History of vitamin D deficiency. 1. Vitamin D 25-hydroxy was 23 on 2/1.  8. Morbid obesity; BMI of 44. 4.     Labs reviewed on:  1. 1/31.  2. 2/1.  3. 2/3.     Infectious Disease:  1. Cefepime. 2. Contact isolation for VRE. Missed Therapy Time:  None     DME:    Discharge Plan:    Greater than 50% of 25 minutes was spent with the patient and daughter on discussion of the treatment of the current pneumonia and a review of the labs to support this as well as encouraging the use of the incentive spirometer.     Charles Sun MD

## 2020-02-03 NOTE — PROGRESS NOTES
[address]  Occupational Therapy  Daily Note  Time:   Time In: 1430  Time Out: 1500  Timed Code Treatment Minutes: 30 Minutes  Minutes: 30    Date: 2/3/2020  Patient Name: Marti Ambriz,   Gender: female      Room: [facility]/053-A  MRN: [id number]  : 1955  (75 y.o.)  Referring Practitioner: Dr. Mame Cat  Diagnosis: s/p total Knee Arthroplasty, L   Additional Pertinent Hx: The patient is a 75 y.o. female who had a left TKR done at [facility] 2 days ago (2020) by Dr Teri Jones. She was recuperating well when she suddenly became hypoxemic, hypotensive and flipped into a-fib with RVR-HR in the 150s today. She was bolused with IVF and transferred to acute bed at [facility] for further care. Dopplers of LE Neg for DVT. VQ scan inconclusive for PE. CTA of chest negative for clot and emboli. Transfer to Rehab 2020    Restrictions/Precautions:  Restrictions/Precautions: General Precautions, Fall Risk, Contact Precautions, Weight Bearing  Left Lower Extremity Weight Bearing: Weight Bearing As Tolerated    SUBJECTIVE: Patient in chair upon arrival agreeable to OT treatment     PAIN: denies/10:     COGNITION: WFL    ADL:   Grooming: Stand By Assistance. at sink to wash hands   Toileting: Contact Guard Assistance. Toilet Transfer: Contact Guard Assistance. Josiane Rudolph BALANCE:  Sitting Balance:  Supervision. Standing Balance: Contact Guard Assistance. BED MOBILITY:  Not Tested    TRANSFERS:  Sit to Stand:  Contact Guard Assistance. Stand to Sit: Contact Guard Assistance. FUNCTIONAL MOBILITY:  Assistive Device: Rolling Walker  Assist Level:  Contact Guard Assistance. Distance:  To and from bathroom and To and from therapy apartment     ADDITIONAL ACTIVITIES:  Patient completed IADL takss of laundry this date with retrieving laundry from dryer with SBA, min cues for walker placement for increased safety, patient steady throughout with BUE release from walker while putting clothing into bag, upon arrival to room patient completed dynamic standing putting laundry away in [facility]. Tolerated well. Steady throughout. ASSESSMENT:  Assessment: Pt, Jeanne Pederson, has made steady gains on IP rehab but is limited due to short length of stay thus far. She currently requires contact guard A for functional transfers, basic mobility and standing during ADL and IADL tasks. She completes UB ADLs at a set-up A level and requires min A for LB ADLs, but does report comptency with LHAE if needed. Pt completed daily routines without cue's for TKA precautions and reports she feels comfortable with precautions due to a previous TKA on other LE. She lives home alone so she will be required to be MI at discharge but does have intermittent support by daughter if needed. Activity Tolerance:  Patient tolerance of  treatment: fair. Discharge Recommendations: Home with assist PRN, Patient would benefit from continued therapy after discharge  Equipment Recommendations: Equipment Needed: No  Other: Pt has LHAE if needed. Pt reports daughter is buying her a shower chair and grab bars to install in shower and near toilet. Plan: Times per week: 5x/wk x90 min and 1x/wk x 30 min  Plan weeks: 3  Current Treatment Recommendations: Endurance Training, Patient/Caregiver Education & Training, Balance Training, Functional Mobility Training, Safety Education & Training, Self-Care / ADL, Home Management Training    Patient Education  Patient Education: ADL's and IADL's    Goals  Short term goals  Time Frame for Short term goals: 2 weeks  Short term goal 1: Pt to increase endurance to be able to navigate [facility] distances requiring in home (approx 25 feet) using AD with SBA, no increase in SOB or need for rest breaks to complete simple homemaking tasks and ADLs  Short term goal 2: Pt to don/doff LB clothing including ALEJANDRA hose using LHAE or compensatory tech with min A to increase indep with self care.   Short term

## 2020-02-03 NOTE — PROGRESS NOTES
[facility]      Patient - Gigi Saha   MRN -  [id number]   Acct # - [de-identified]   - 1955      Date of Admission -  2020  3:38 PM  Date of evaluation -  2/3/2020  Room - [address] [place] [address], MD Primary Care Physician - Kavin Raymundo DO     Problem List      Active Hospital Problems    Diagnosis Date Noted    S/P total knee arthroplasty, left [Z96.652] 2020     Reason for Consult    Atelectasis with SOB requiring oxygen supplementation  HPI   History Obtained From: Patient, patient's daughter, and electronic medical record. Gigi Saha is a 75 y.o. female with a PMHx of Obesity, hypothyroidism, seizures, HLD, HTN, and S/P left total knee replacement on 20 at [facility] by [facility]. Pulmonology was consulted for sudden onset hypoxia and shortness of breath that started suddenly on POD 2. Pt. Had an acute kidney injury so CTA was delayed until 2020. It showed no evidence of PE, bibasilar atalectasis, surgical changes from prior left partial pneumonectomy, and a 6 mm nodule in the residual left lung. The lung nodule should be followed up with a CTA in 1 year. Patient has been off of oxygen since about 0900 on 2020 and is tolerating it well. She denies continued SOB and reports getting less tired with activity. She is having chest pain:No    Subjective/Events Past 24 hours/ROS   -0900 2020 taken off supplemental O2.  -Patient did not use CPAP or supplemental oxygen overnight  -Denies SOB, including with activity and physical therapy. She reports some fatigue with activity  -Denies chest pain, abdominal pain, cough, congestion, constipation, nausea, or diarrhea  -Pt.  States she has been using her incentive spirometer    PMHx   Past Medical History      Diagnosis Date    Hyperlipemia 2012    Hyperlipidemia     Hypertension     Hypothyroidism     Morbid obesity with BMI of 45.0-49.9, adult ([facility]     TONEY on CPAP 1/30/2020    PAF (paroxysmal atrial fibrillation) (HCC)     Seizures ([facility]     last sz 20+years ago    Vitamin D deficiency       Past Surgical History        Procedure Laterality Date    APPENDECTOMY  1978    CATARACT REMOVAL  2004 [address]    CHOLECYSTECTOMY  1978    TOTAL HIP ARTHROPLASTY Left 07/06/2012    TOTAL KNEE ARTHROPLASTY Left 01/24/2020     Meds    Current Medications    sodium chloride flush  10 mL Intravenous BID    cefepime  2 g Intravenous Q8H    magnesium oxide  400 mg Oral Daily    Vitamin D  5,000 Units Oral Daily    apixaban  5 mg Oral BID    calcium acetate  667 mg Oral BID    diltiazem  120 mg Oral Daily    furosemide  20 mg Oral BID    levETIRAcetam  1,000 mg Oral BID    levothyroxine  25 mcg Oral Daily    metoprolol succinate  50 mg Oral Daily    potassium chloride  20 mEq Oral BID WC    simvastatin  80 mg Oral Nightly     sodium chloride flush, magnesium hydroxide, HYDROcodone 5 mg - acetaminophen **OR** HYDROcodone 5 mg - acetaminophen, ipratropium-albuterol, loperamide, acetaminophen, docusate sodium, senna  IV Drips/Infusions    Home Medications  Medications Prior to Admission: apixaban (ELIQUIS) 5 MG TABS tablet, Take 1 tablet by mouth 2 times daily  diltiazem (CARDIZEM CD) 120 MG extended release capsule, Take 1 capsule by mouth daily  furosemide (LASIX) 20 MG tablet, Take 1 tablet by mouth 2 times daily  HYDROcodone-acetaminophen (NORCO) 5-325 MG per tablet, Take 1 tablet by mouth every 4 hours as needed for Pain for up to 3 days.   loperamide (IMODIUM) 2 MG capsule, Take 1 capsule by mouth 3 times daily as needed for Diarrhea  simvastatin (ZOCOR) 80 MG tablet, Take 80 mg by mouth nightly  metoprolol succinate (TOPROL XL) 50 MG extended release tablet, Take 50 mg by mouth daily  levETIRAcetam (KEPPRA) 1000 MG tablet, Take 1,000 mg by mouth 2 times daily  calcium acetate (PHOSLO) 667 MG capsule, Take  by mouth 2 times daily. phenytoin (DILANTIN) 100 MG ER capsule, Take 300 mg by mouth nightly. vitamin D (CHOLECALCIFEROL) 1000 UNIT TABS tablet, Take 1,000 Units by mouth 3 times daily. levothyroxine (SYNTHROID) 25 MCG tablet, Take 25 mcg by mouth Daily. Diet    DIET GENERAL;  Dietary Nutrition Supplements: Low Calorie High Protein Supplement  Allergies    Latex and Pcn [penicillins]  Social History     Social History     Socioeconomic History    Marital status:      Spouse name: Not on file    Number of children: Not on file    Years of education: Not on file    Highest education level: Not on file   Occupational History    Not on file   Social Needs    Financial resource strain: Not on file    Food insecurity:     Worry: Not on file     Inability: Not on file    Transportation needs:     Medical: Not on file     Non-medical: Not on file   Tobacco Use    Smoking status: Never Smoker    Smokeless tobacco: Never Used   Substance and Sexual Activity    Alcohol use: No    Drug use: No    Sexual activity: Not on file   Lifestyle    Physical activity:     Days per week: Not on file     Minutes per session: Not on file    Stress: Not on file   Relationships    Social connections:     Talks on phone: Not on file     Gets together: Not on file     Attends [employer] service: Not on file     Active member of club or organization: Not on file     Attends meetings of clubs or organizations: Not on file     Relationship status: Not on file    Intimate partner violence:     Fear of current or ex partner: Not on file     Emotionally abused: Not on file     Physically abused: Not on file     Forced sexual activity: Not on file   Other Topics Concern    Not on file   Social History Narrative    Not on file     Family History          Problem Relation Age of Onset    Arthritis Father     Arthritis Mother     Diabetes Brother      Sleep History    Has sleep apnea, uses a CPAP machine at night.  She mobility of extremities and no LE edema  Lymphadenopathy: No cervical adenopathy. Neurological: A&OX3  Skin: Warm and moist.    Labs  - Old records and notes have been reviewed in [facility]   ABG  Lab Results   Component Value Date    PH 7.36 01/26/2020    PO2 64 01/26/2020    PCO2 36 01/26/2020    HCO3 20 01/26/2020    O2SAT 91 01/26/2020     Lab Results   Component Value Date    IFIO2 5 01/26/2020     CBC  Recent Labs     02/01/20  0937 02/02/20  0629 02/03/20  0614   WBC 22.0* 23.0* 19.2*   RBC 4.42 3.93* 3.59*   HGB 12.5 11.2* 10.1*   HCT 38.8 35.0* 33.2*   MCV 87.8 89.1 92.5   MCH 28.3 28.5 28.1   MCHC 32.2 32.0* 30.4*    314 340   MPV 9.9 9.8 9.8      BMP  Recent Labs     02/01/20  0937 02/03/20  0614    139   K 4.0 4.7   CL 97* 102   CO2 22* 23   BUN 22 29*   CREATININE 0.9 0.7   GLUCOSE 136* 101   MG 1.5* 2.1   PHOS 3.9  --    CALCIUM 8.8 8.8     LFT  Recent Labs     02/01/20  0937   AST 49*   ALT 20   BILITOT 0.7   ALKPHOS 115     TROP  Lab Results   Component Value Date    TROPONINT 0.122 01/27/2020    TROPONINT 0.102 01/27/2020    TROPONINT 0.054 01/26/2020     BNP  No results for input(s): BNP in the last 72 hours. Lactic Acid  No results for input(s): LACTA in the last 72 hours. INR  No results for input(s): INR, PROTIME in the last 72 hours. PTT  No results for input(s): APTT in the last 72 hours. Glucose  No results for input(s): POCGLU in the last 72 hours. UA   Recent Labs     02/03/20  0350   SPECGRAV 1.016   PHUR 6.5   COLORU YELLOW   PROTEINU TRACE*   BLOODU NEGATIVE   RBCUA 0-2   WBCUA NONE SEEN   BACTERIA NONE SEEN   NITRU NEGATIVE   BILIRUBINUR NEGATIVE   UROBILINOGEN 0.2   KETUA NEGATIVE   LABCAST NONE SEEN  NONE SEEN   . Sleep studies   [facility]. Request has been placed for those results. Nursing is trying to contact their office again today.     EKG   01/29/2020 Normal sinus rhythm, Left axis deviation Right bundle branch block, Inferior infarct (cited on

## 2020-02-03 NOTE — PROGRESS NOTES
[address] [address]  INPATIENT PHYSICAL THERAPY  DAILY NOTE  [facility] - 7E-53/053-A    Time In: 1400  Time Out: 1430  Timed Code Treatment Minutes: 30 Minutes  Minutes: 30          Date: 2/3/2020  Patient Name: Virginia Mullins,  Gender:  female        MRN: [id number]  : 1955  (75 y.o.)     Referring Practitioner: KIM Bangura MD  Diagnosis: S/P total knee arthroplasty, left  Additional Pertinent Hx: Per admitting MD note on 20, patient is a 75 y.o. female who had a left TKR done at [facility] 2 days ago. She was recuperating well when she suddenly became hypoxemic, hypotensive and flipped into a-fib with RVR-HR in the 150s today. She was bolused with IVF and transferred to acute bed at [address] [place] [address] for further care. To [facility] on      Prior Level of Function:  Lives With: Alone  Type of Home: House  Home Layout: One level  Home Access: Stairs to enter with rails  Entrance Stairs - Number of Steps: 2+1  Entrance Stairs - Rails: Left  Home Equipment: Frankey [place], Reacher, Sock aid, Long-handled shoehorn, Standard walker   Bathroom Shower/Tub: Tub/Shower unit  Bathroom Toilet: Standard  Bathroom Equipment: 3-in-1 commode    Receives Help From: Family  ADL Assistance: Independent  Homemaking Assistance: Independent  Homemaking Responsibilities: Yes  Ambulation Assistance: Independent  Transfer Assistance: Independent  Active : Yes  IADL Comments: 3 children live nearby and able to check in on patient and assist as needed. Neighbor Ignacio Hashimoto) will be able to help at discharge as well. (Daughter is a BOOTH). Additional Comments: Pt stating she was completely indep at home with ADL tasks and IADL tasks PTA, no AD PTA    Restrictions/Precautions:  Restrictions/Precautions: General Precautions, Fall Risk, Contact Precautions, Weight Bearing  Left Lower Extremity Weight Bearing: Weight Bearing As Tolerated    SUBJECTIVE: Pt in BS chair upon arrival. Pt agreeable to therapy.  SpO2 at end of session 94%.    PAIN: 2/10, Left knee    OBJECTIVE:  Bed Mobility:  Not Tested    Transfers:  Sit to Stand: Stand By Assistance, [employer], with increased time for completion  Stand to Sit:Stand By Assistance, [employer], with increased time for completion    Ambulation:  Stand By Assistance, [employer], with increased time for completion  Distance: 100'x1, 90'x1, 20'x1 on carpet  Surface: Level Tile and Carpet  Device:Rolling Walker  Gait Deviations:  Slow Rayna, Decreased Step Length Bilaterally, Decreased Weight Shift Left, Decreased Gait Speed, Decreased Heel Strike Bilaterally, Hip Hikes right side, Outward translation of feet, and Mild Path Deviations    Balance:  Dynamic Standing Balance: Contact Guard Assistance  Pt completed ring toss while reaching out of FRANDY ~10'. Cues for weight shifting and posture. No UE support used. One rest break needed throughout due to fatigue. Exercise:  Not tested    Functional Outcome Measures: Not completed    ASSESSMENT:  Assessment: Patient progressing toward established goals. Activity Tolerance:  Patient tolerance of  treatment: good.       Equipment Recommendations:Equipment Needed: Yes(RW, has standard)  Discharge Recommendations:  Continue to assess pending progress    Plan: Times per week: 5x/wk 90min, 1x/wk 30min  Current Treatment Recommendations: Strengthening, Gait Training, Patient/Caregiver Education & Training, ROM, Functional Mobility Training, Endurance Training, Transfer Training, Safety Education & Training, Home Exercise Program, Equipment Evaluation, Education, & procurement, ADL/Self-care Training, Stair training, Balance Training    Patient Education  Patient Education: Plan of Care, Transfers, Gait, Verbal Exercise Instruction    Goals:  Patient goals : go home  Short term goals  Time Frame for Short term goals: 1 week  Short term goal 1: Pt to be S for supine <> sit to get in/out of bed  Short term goal 2: Pt to be S for sit <> stand to get up to ambulate  Short term goal 3: Pt to ambulate >100 ft with RW with Supervision for household distances  Short term goal 4: Pt to negotiate 2 steps with L ascending rail at [facility] for home access  Short term goal 5: Pt time on TUG will improve to <=17 seconds with use of RW to decrease risk of falls  Short term goal 6: Pt AROM of L knee will improve to range from 0-95 degrees to allow for improved gait mechanics  Long term goals  Time Frame for Long term goals : 2 weeks  Long term goal 1: Pt to be mod I for supine <> sit to get in/out of bed  Long term goal 2: Pt to be mod I for sit <> stand to get up to ambulate  Long term goal 3: Pt to ambulate >100 ft with RW at [facility] for household distances  Long term goal 4: Pt to negotiate 2 steps with L ascending rail at [facility] for home access  Long term goal 5: Pt to perform car transfer at [facility] for transport needs  Long term goal 6: Pt time on TUG will improve to <=14 seconds with use of RW to decrease risk of falls    Following session, patient left in safe position with all fall risk precautions in place.     Myla Mackenzie PTA

## 2020-02-03 NOTE — PLAN OF CARE
Problem: DISCHARGE BARRIERS  Goal: Patient's continuum of care needs are met  Note:   [address] [address]  Physical Medicine Case Management Assessment    [x] Inpatient Rehabilitation Unit  [] Transitional Care Unit    Patient Name: Mi Castrejon        MRN: [id number]    : 1955  (75 y.o.)  Gender: female     Date of Admission: 2020      Family/Social/Home Environment: Prior to knee replacement and medical complications, patient was independent with ADL's and personal care. Patient works part-time as a CNA at [employer]. Patient is currently on FMLA with return to work planned for 2020. Patient lives alone. Patient was driving, managing own medications, managing own finances, completing errands, meal preparation, laundry, and housekeeping. Patient's initial plan after surgery was to discharge home with family support (required to have someone stay for 24 hours), but due to medical complications patient felt it was necessary for continued hospitalization with rehab. Patient is unsure if anyone will be able to stay for 24 hours upon discharge, but if needed will be able to figure out. Patients neighbor and children able to provide increased support. Patient's daughter, Tiffany Tom, lives in [place], but works full time (BOOTH). Patient's son, Deni Gaitan, lives in [facility], but works in [employer]. Patient's son, Lissette Desouza, lives in [place], but works in [facility]. Patient has a standard walker that her sister purchased for her with her previous knee replacement. Patient has cpap machine that is dispensed through [employer]. Patient reports PCP, Dr. Lyn, retired and is now with Dr. Altaf Hernandez. Current barriers to discharge include IV antibiotic every eight hours until 2020. Patient currently not interest in taking home CPM machine, but will let SW know if that changes.      Social/Functional History  Lives With: Alone  Type of Home: House  Home Layout: One level  Home Access: Stairs to

## 2020-02-03 NOTE — PROGRESS NOTES
Nutrition Assessment    Type and Reason for Visit: Initial(New patient)    Nutrition Recommendations:   *Started Ensure High Protein BID. *Recommend a Multivitamin w/minerals daily. *Continue current diet as ordered. Nutrition Assessment: Pt. nutritionally compromised AEB pt report of decreased appetite over the past week since surgery consuming 26-50% most meals. At risk for further nutrition compromise r/t increased nutrient needs to aid in wound healing, underlying medical condition (HTN, obesity) and need for nutrition support. Nutrition recommendations/interventions as per above. Malnutrition Assessment:  · Malnutrition Status: At risk for malnutrition    Nutrition Risk Level: Moderate    Nutrient Needs:  · Estimated Daily Total Kcal: 1696-9587 kcal/day (13-15 kcal/kg - 98.9 kg on 2/1)  · Estimated Daily Protein (g): 53-89 g/day (1.2-2 g/kg - IBW 44.4 kg)    Nutrition Diagnosis:   · Problem: Inadequate oral intake  · Etiology: related to Insufficient energy/nutrient consumption     Signs and symptoms:  as evidenced by Diet history of poor intake    Objective Information:  · Nutrition-Focused Physical Findings: pt resting in bed; reports poor appetite over the past wee since surgery but states food is tasting better and ate most of her breakfast today; pt denies N/V/C- last BM x2 on 2/2; Meds: Lasix  · Wound Type: Surgical Wound(s/p left total knee arthroplasty on 1/24/20)  · Current Nutrition Therapies:  · Oral Diet Orders: General   · Oral Diet intake: 26-50%  · Oral Nutrition Supplement (ONS) Orders: Low Calorie High Protein Supplement(Chocolate Ensure High Protein BID)  · ONS intake: (Initiated today)  · Anthropometric Measures:  · Ht: 4' 11\" (149.9 cm)   · Current Body Wt: 218 lb (98.9 kg)(2/1; +1 non-pitting edema BLE)  · Admission Body Wt: 218 lb 4.8 oz (99 kg)(1/31; +1 non-pitting edema BLE)  · Usual Body Wt: 211 lb (95.7 kg)(per pt report over the past year.  Per EMR: 220 lb 4.8 oz on

## 2020-02-04 LAB
ERYTHROCYTE [DISTWIDTH] IN BLOOD BY AUTOMATED COUNT: 13.9 % (ref 11.5–14.5)
ERYTHROCYTE [DISTWIDTH] IN BLOOD BY AUTOMATED COUNT: 46.5 FL (ref 35–45)
HCT VFR BLD CALC: 32.6 % (ref 37–47)
HEMOGLOBIN: 10.2 GM/DL (ref 12–16)
MCH RBC QN AUTO: 28.5 PG (ref 26–33)
MCHC RBC AUTO-ENTMCNC: 31.3 GM/DL (ref 32.2–35.5)
MCV RBC AUTO: 91.1 FL (ref 81–99)
PLATELET # BLD: 480 THOU/MM3 (ref 130–400)
PMV BLD AUTO: 9.9 FL (ref 9.4–12.4)
RBC # BLD: 3.58 MILL/MM3 (ref 4.2–5.4)
WBC # BLD: 18.2 THOU/MM3 (ref 4.8–10.8)

## 2020-02-04 PROCEDURE — 97535 SELF CARE MNGMENT TRAINING: CPT

## 2020-02-04 PROCEDURE — 36415 COLL VENOUS BLD VENIPUNCTURE: CPT

## 2020-02-04 PROCEDURE — 6370000000 HC RX 637 (ALT 250 FOR IP): Performed by: INTERNAL MEDICINE

## 2020-02-04 PROCEDURE — 97116 GAIT TRAINING THERAPY: CPT

## 2020-02-04 PROCEDURE — 2580000003 HC RX 258: Performed by: PHYSICAL MEDICINE & REHABILITATION

## 2020-02-04 PROCEDURE — 97530 THERAPEUTIC ACTIVITIES: CPT

## 2020-02-04 PROCEDURE — 85027 COMPLETE CBC AUTOMATED: CPT

## 2020-02-04 PROCEDURE — 6360000002 HC RX W HCPCS: Performed by: PHYSICAL MEDICINE & REHABILITATION

## 2020-02-04 PROCEDURE — 6370000000 HC RX 637 (ALT 250 FOR IP): Performed by: PHYSICAL MEDICINE & REHABILITATION

## 2020-02-04 PROCEDURE — 97110 THERAPEUTIC EXERCISES: CPT

## 2020-02-04 PROCEDURE — 1180000000 HC REHAB R&B

## 2020-02-04 RX ADMIN — LEVETIRACETAM 1000 MG: 500 TABLET, FILM COATED ORAL at 08:07

## 2020-02-04 RX ADMIN — LEVOTHYROXINE SODIUM 25 MCG: 25 TABLET ORAL at 05:42

## 2020-02-04 RX ADMIN — VITAMIN D, TAB 1000IU (100/BT) 5000 UNITS: 25 TAB at 08:08

## 2020-02-04 RX ADMIN — ACETAMINOPHEN 650 MG: 325 TABLET ORAL at 06:31

## 2020-02-04 RX ADMIN — FUROSEMIDE 20 MG: 20 TABLET ORAL at 11:34

## 2020-02-04 RX ADMIN — ACETAMINOPHEN 650 MG: 325 TABLET ORAL at 13:40

## 2020-02-04 RX ADMIN — Medication 1 CAPSULE: at 16:54

## 2020-02-04 RX ADMIN — SIMVASTATIN 80 MG: 40 TABLET, FILM COATED ORAL at 21:04

## 2020-02-04 RX ADMIN — POTASSIUM CHLORIDE 20 MEQ: 1500 TABLET, EXTENDED RELEASE ORAL at 08:08

## 2020-02-04 RX ADMIN — CALCIUM ACETATE 667 MG: 667 CAPSULE ORAL at 21:05

## 2020-02-04 RX ADMIN — CEFEPIME 2 G: 2 INJECTION, POWDER, FOR SOLUTION INTRAMUSCULAR; INTRAVENOUS at 03:01

## 2020-02-04 RX ADMIN — LEVETIRACETAM 1000 MG: 500 TABLET, FILM COATED ORAL at 21:04

## 2020-02-04 RX ADMIN — APIXABAN 5 MG: 5 TABLET, FILM COATED ORAL at 21:06

## 2020-02-04 RX ADMIN — DILTIAZEM HYDROCHLORIDE 120 MG: 120 CAPSULE, COATED, EXTENDED RELEASE ORAL at 08:08

## 2020-02-04 RX ADMIN — CALCIUM ACETATE 667 MG: 667 CAPSULE ORAL at 08:08

## 2020-02-04 RX ADMIN — APIXABAN 5 MG: 5 TABLET, FILM COATED ORAL at 08:08

## 2020-02-04 RX ADMIN — CEFEPIME 2 G: 2 INJECTION, POWDER, FOR SOLUTION INTRAMUSCULAR; INTRAVENOUS at 11:34

## 2020-02-04 RX ADMIN — METOPROLOL SUCCINATE 50 MG: 50 TABLET, EXTENDED RELEASE ORAL at 08:08

## 2020-02-04 RX ADMIN — POTASSIUM CHLORIDE 20 MEQ: 1500 TABLET, EXTENDED RELEASE ORAL at 16:54

## 2020-02-04 RX ADMIN — FUROSEMIDE 20 MG: 20 TABLET ORAL at 05:42

## 2020-02-04 RX ADMIN — Medication 1 CAPSULE: at 08:08

## 2020-02-04 RX ADMIN — SODIUM CHLORIDE, PRESERVATIVE FREE 10 ML: 5 INJECTION INTRAVENOUS at 11:35

## 2020-02-04 ASSESSMENT — ENCOUNTER SYMPTOMS
BACK PAIN: 0
BLOOD IN STOOL: 0
TROUBLE SWALLOWING: 0
CONSTIPATION: 0
SHORTNESS OF BREATH: 0
EYE PAIN: 0
CHEST TIGHTNESS: 0
DIARRHEA: 0
SORE THROAT: 0
ALLERGIC/IMMUNOLOGIC NEGATIVE: 1
NAUSEA: 0

## 2020-02-04 ASSESSMENT — PAIN DESCRIPTION - ORIENTATION
ORIENTATION: LEFT
ORIENTATION: LEFT

## 2020-02-04 ASSESSMENT — PAIN SCALES - GENERAL
PAINLEVEL_OUTOF10: 0
PAINLEVEL_OUTOF10: 3
PAINLEVEL_OUTOF10: 0
PAINLEVEL_OUTOF10: 2

## 2020-02-04 ASSESSMENT — PAIN DESCRIPTION - LOCATION
LOCATION: KNEE
LOCATION: KNEE

## 2020-02-04 ASSESSMENT — PAIN DESCRIPTION - PAIN TYPE
TYPE: SURGICAL PAIN
TYPE: SURGICAL PAIN

## 2020-02-04 ASSESSMENT — PAIN DESCRIPTION - DESCRIPTORS: DESCRIPTORS: ACHING

## 2020-02-04 NOTE — PROGRESS NOTES
[address]  Occupational Therapy  Daily Note  Time:   Time In: 0830  Time Out: 0930  Timed Code Treatment Minutes: 60 Minutes  Minutes: 60    Date: 2020  Patient Name: Estefany Painting,   Gender: female      Room: [facility]/053-A  MRN: [id number]  : 1955  (75 y.o.)  Referring Practitioner: Dr. Carmelo Flanagan  Diagnosis: s/p total Knee Arthroplasty, L   Additional Pertinent Hx: The patient is a 75 y.o. female who had a left TKR done at [facility] 2 days ago (2020) by Dr Andria Arreola. She was recuperating well when she suddenly became hypoxemic, hypotensive and flipped into a-fib with RVR-HR in the 150s today. She was bolused with IVF and transferred to acute bed at [facility] for further care. Dopplers of LE Neg for DVT. VQ scan inconclusive for PE. CTA of chest negative for clot and emboli. Transfer to Rehab 2020    Restrictions/Precautions:  Restrictions/Precautions: General Precautions, Fall Risk, Contact Precautions, Weight Bearing  Left Lower Extremity Weight Bearing: Weight Bearing As Tolerated    SUBJECTIVE: Pt seated in recliner upon arrival, agreeable to OT. Pt expresses having loose stool and having to use the restroom a lot. Pt had BM near end of session x1 incontinent episode x2 continent. PAIN: 2/10    COGNITION: WFL    ADL:   Grooming: Stand By Assistance. wash hands  Upper Body Dressing: Set up Assistance. Lower Extremity Dressing: Minimal Assistance. with threading R LE  Toileting: Contact Guard Assistance. Toilet Transfer: Contact Guard Assistance. Clarene Search BALANCE:  Sitting Balance:  Supervision. Standing Balance: Contact Guard Assistance. During dynamic standing activity    BED MOBILITY:  Not Tested    TRANSFERS:  Sit to Stand:  Contact Guard Assistance. raised toilet seat, recliner, standard chair  Stand to Sit: [address] FUNCTIONAL MOBILITY:  Assistive Device: Rolling Walker  Assist Level:  Contact Guard Assistance. Distance: To and from therapy apartment  Within therapy apartment during IADL activity     ADDITIONAL ACTIVITIES:  -Pt completed IADL activity of grilling a sandwich within the therapy apartment requiring CGA for balance x10 min x2 events. Pt also completed dynamic standing activity of washing dishes requiring SBA for balance x8 min. Pt completed activities with no vc's required for L knee precautions. ASSESSMENT:  Activity Tolerance:  Patient tolerance of  treatment: good. Discharge Recommendations: Home with assist PRN, Patient would benefit from continued therapy after discharge  Equipment Recommendations: Equipment Needed: No  Other: Pt has LHAE if needed. Pt reports daughter is buying her a shower chair and grab bars to install in shower and near toilet. Plan: Times per week: 5x/wk x90 min and 1x/wk x 30 min  Plan weeks: 3  Current Treatment Recommendations: Endurance Training, Patient/Caregiver Education & Training, Balance Training, Functional Mobility Training, Safety Education & Training, Self-Care / ADL, Home Management Training    Patient Education  Patient Education: ADL's, IADL's, Energy Conservation, Home Safety and Assistive Device Safety    Goals  Short term goals  Time Frame for Short term goals: 2 weeks  Short term goal 1: Pt to increase endurance to be able to navigate HH distances requiring in home (approx 25 feet) using AD with SBA, no increase in SOB or need for rest breaks to complete simple homemaking tasks and ADLs  Short term goal 2: Pt to don/doff LB clothing including ALEJANDRA hose using LHAE or compensatory tech with CGA to increase indep with self care. Short term goal 3: Pt to increase standing endurance to 3 min with SBA, and no UE support to complete simple meal prep. Short term goal 4: Pt to complete toilet hygiene with adaptive strategies and SBA to increase indep with wiping bottom during toileting routine.   Long term goals  Time Frame for Long term goals : 3 weeks  Long term goal 1: Pt will complete light meal prep with Hannah to restore PLOF required for pt to return home alone. Long term goal 2: Pt will complete ADL routine including shower t/f with Hannah to increase indep with self care for return home alone. Following session, patient left in safe position with all fall risk precautions in place.       Yolande BOOTH/STELLA [id number]

## 2020-02-04 NOTE — PROGRESS NOTES
[address] [address]  INPATIENT PHYSICAL THERAPY  DAILY NOTE  [facility] - [id number]    Time In: 1330  Time Out: 1430  Timed Code Treatment Minutes: 60 Minutes  Minutes: 60          Date: 2020  Patient Name: Maynor Avila,  Gender:  female        MRN: [id number]  : 1955  (75 y.o.)     Referring Practitioner: KIM Ordoñez MD  Diagnosis: S/P total knee arthroplasty, left  Additional Pertinent Hx: Per admitting MD note on 20, patient is a 75 y.o. female who had a left TKR done at [facility] 2 days ago. She was recuperating well when she suddenly became hypoxemic, hypotensive and flipped into a-fib with RVR-HR in the 150s today. She was bolused with IVF and transferred to acute bed at [facility] for further care. To [facility] on      Prior Level of Function:  Lives With: Alone  Type of Home: House  Home Layout: One level  Home Access: Stairs to enter with rails  Entrance Stairs - Number of Steps: 2+1  Entrance Stairs - Rails: Left  Home Equipment: Carolyne Rubinstein, Reacher, Sock aid, Long-handled shoehorn, Standard walker   Bathroom Shower/Tub: Tub/Shower unit  Bathroom Toilet: Standard  Bathroom Equipment: 3-in-1 commode    Receives Help From: Family  ADL Assistance: Independent  Homemaking Assistance: Independent  Homemaking Responsibilities: Yes  Ambulation Assistance: Independent  Transfer Assistance: Independent  Active : Yes  IADL Comments: 3 children live nearby and able to check in on patient and assist as needed. Neighbor [employer]) will be able to help at discharge as well. (Daughter is a BOOTH). Additional Comments: Pt stating she was completely indep at home with ADL tasks and IADL tasks PTA, no AD PTA    Restrictions/Precautions:  Restrictions/Precautions: General Precautions, Fall Risk, Contact Precautions, Weight Bearing  Left Lower Extremity Weight Bearing: Weight Bearing As Tolerated    SUBJECTIVE: Patient in recliner upon arrival, agreed and cooperative for therapy.  Asking to get tylenol at beginning of session, RN notified and administered medication. Requesting to use bathroom at start of session and end of session, daughter in room to assist at end of session. PAIN: 2/10:Left knee    OBJECTIVE:  Bed Mobility:  Supine to Sit: Moderate Assistance, with verbal cues , with increased time for completion, to assist at trunk   Sit to Supine: Minimal Assistance, with verbal cues , with increased time for completion, to lightly assist at LLE   Scootin [address] to scoot segments of body in bed. Transfers:  Sit to Stand: Stand By Assistance, cues for hand placement  Stand to Sit:Stand By Assistance, cues for hand placement    Ambulation:  Contact Guard Assistance, with verbal cues   Distance: 150 ft. x2   Surface: Level Tile  Device:Rolling Walker  Gait Deviations: Forward Flexed Posture, Slow Rayna, Decreased Step Length Bilaterally, Decreased Weight Shift Left, Decreased Gait Speed and Decreased Heel Strike Bilaterally    Balance:  Dynamic Standing Balance: Stand By Assistance, with cues for safety, in bathroom for clothing management. Exercise:  Patient was guided in 1 set(s) 10 reps of exercise to both lower extremities. Patient rode Nu-step bike x5 minutes, L1 to work on knee ROM on LLE. Ankle pumps, Glut sets, Quad sets, Heelslides (Mod. A to complete full ROM), Short arc quads, Hip abduction/adduction (min. A on LLE) and Straight leg raises (Max. A on LLE). AAROM for left knee 1-90 degrees. Exercises were completed for increased independence with functional mobility. Functional Outcome Measures: Not completed       ASSESSMENT:  Assessment: Patient progressing toward established goals. Activity Tolerance:  Patient tolerance of  treatment: good.       Equipment Recommendations:Equipment Needed: Yes(RW ordered from [facility] 20)  Discharge Recommendations: Home with assistance PRN, Outpatient PT, Home with Home health PT Continue to assess pending progress    Plan: Times per week: 5x/wk 90min, 1x/wk 30min  Current Treatment Recommendations: Strengthening, Gait Training, Patient/Caregiver Education & Training, ROM, Functional Mobility Training, Endurance Training, Transfer Training, Safety Education & Training, Home Exercise Program, Equipment Evaluation, Education, & procurement, ADL/Self-care Training, Stair training, Balance Training    Patient Education  Patient Education: Plan of Care, Precautions/Restrictions, [employer] Mobility, Transfers, Reviewed Prior Education, Gait,  - Patient Verbalized Understanding    Goals:  Patient goals : go home  Short term goals  Time Frame for Short term goals: 1 week  Short term goal 1: Pt to be S for supine <> sit to get in/out of bed  Short term goal 2: Pt to be S for sit <> stand to get up to ambulate  Short term goal 3: Pt to ambulate >100 ft with RW with Supervision for household distances  Short term goal 4: Pt to negotiate 2+ steps with L ascending rail at [facility], curb step at [address] w/ RW for home access  Short term goal 5: Pt time on TUG will improve to <=17 seconds with use of RW to decrease risk of falls  Short term goal 6: Pt AROM of L knee will improve to range from 0-95 degrees to allow for improved gait mechanics  Long term goals  Time Frame for Long term goals : 2 weeks  Long term goal 1: Pt to be mod I for supine <> sit to get in/out of bed  Long term goal 2: Pt to be mod I for sit <> stand to get up to ambulate  Long term goal 3: Pt to ambulate >=150 ft with RW at mod I for household distances  Long term goal 4: Pt to negotiate 2+ steps with L ascending rail at mod I, curb step at [facility] I w/ RW for home access  Long term goal 5: Pt to perform car transfer at mod I for transport needs  Long term goal 6: Pt time on TUG will improve to <=14 seconds with use of RW to decrease risk of falls    Following session, patient left in safe position with all fall risk precautions in place.

## 2020-02-04 NOTE — PROGRESS NOTES
completed     ASSESSMENT:  Assessment: Patient progressing toward established goals. Activity Tolerance:  Patient tolerance of  treatment: good. Equipment Recommendations:Equipment Needed: Yes(RW ordered from [facility] 2/4/20)  Discharge Recommendations: Home with assistance PRN, Outpatient PT, Home with Home health PT Continue to assess pending progress     Occupational Therapy:  COGNITION: WFL     ADL:   Grooming: Stand By Assistance. wash hands  Toileting: Contact Guard Assistance. Pt experience 1 event of incontinence, depends changed. Toilet Transfer: Contact Guard Assistance. Lower Extremity Dressing: Minimal Assistance. With threading L LE  Tub Transfer: Minimal Assistance with balance during weight transferring, though pt able to lift L LE into tub with increased time and education. Pt completed dry transfer x2 events to practice safety with knee precautions during functional transfer. Pt tolerated well. Educated pt on placing one leg into the tub, then sit down and pull the other leg in. Pt demo'ed competency with this transfer technique.     BALANCE:  Sitting Balance:  Supervision. Standing Balance: Contact Guard Assistance. During dynamic standing activity.     BED MOBILITY:  Not Tested     TRANSFERS:  Sit to Stand:  Contact Guard Assistance. recliner, raised toilet seat, shower chair  Stand to Sit: Contact Guard Assistance.       FUNCTIONAL MOBILITY:  Assistive Device: Rolling Walker  Assist Level:  Contact Guard Assistance. Distance: To and from shower room To and from bathroom      ASSESSMENT:  Activity Tolerance:  Patient tolerance of  treatment: good. Discharge Recommendations: Home with assist PRN, Patient would benefit from continued therapy after discharge  Equipment Recommendations: Equipment Needed: No  Other: Pt has LHAE if needed. Pt reports daughter is buying her a shower chair and grab bars to install in shower and near toilet.    Speech Therapy:  Not applicable    Review of normal limits  Affect: calm  General appearance:  in no acute distress, up in wheelchairMemory:   grossly normal  Attention/Concentration: normal  Language:  normal     ROM:  abnormal - LEFT TKA  Motor Exam:  Motor exam is 5 out of 5 all extremities with the exception of LEFT leg not testable while in CPM     Tone:  Normal, LEFT leg not testable  Muscle bulk: within normal limits  Sensory:  Sensory intact  Coordination:   normal  Deep Tendon Reflexes:  Reflexes are intact and symmetrical bilaterally, with LEFT patella and achilles MSR not testted     Skin: warm and dry, no rash or erythema  Peripheral vascular: Pulses: Normal upper and lower extremity pulses; Edema: 1+    Diagnostics:   Recent Results (from the past 24 hour(s))   CBC    Collection Time: 02/04/20  5:49 AM   Result Value Ref Range    WBC 18.2 (H) 4.8 - 10.8 thou/mm3    RBC 3.58 (L) 4.20 - 5.40 mill/mm3    Hemoglobin 10.2 (L) 12.0 - 16.0 gm/dl    Hematocrit 32.6 (L) 37.0 - 47.0 %    MCV 91.1 81.0 - 99.0 fL    MCH 28.5 26.0 - 33.0 pg    MCHC 31.3 (L) 32.2 - 35.5 gm/dl    RDW-CV 13.9 11.5 - 14.5 %    RDW-SD 46.5 (H) 35.0 - 45.0 fL    Platelets 504 (H) 261 - 400 thou/mm3    MPV 9.9 9.4 - 12.4 fL     Labs Renal Latest Ref Rng & Units 2/3/2020 2/1/2020 1/31/2020 1/30/2020 1/29/2020   BUN 7 - 22 mg/dL 29(H) 22 25(H) 26(H) 29(H)   Cr 0.4 - 1.2 mg/dL 0.7 0.9 1.0 1.0 1.1   K 3.5 - 5.2 meq/L 4.7 4.0 4.0 4.5 3.7   Na 135 - 145 meq/L 139 135 138 140 140      Recent Labs     02/04/20  0549 02/03/20  0614 02/02/20  0629   WBC 18.2* 19.2* 23.0*   HGB 10.2* 10.1* 11.2*   HCT 32.6* 33.2* 35.0*   MCV 91.1 92.5 89.1   * 340 314      Impression:  1. Status post total knee arthroplasty by Dr. Narinder Monson on 1/24/2020.  2. Gait instability. 3. Acute hypoxemic respiratory failure. 4. Healthcare associated pneumonia. 5. Antibiotic associated diarrhea. 6. Atrial fibrillation with rapid ventricular response. 7. Acute kidney injury.   8. Excessive use of non steroidal prior labs. Further improvement to 0.7/29/83 on 2/3.  3. Avoid NSAIDs. 4. PhosLo. 7. Leukocytosis. 1. WBCs 22.0 on 2/1 compared to 14.9 on 1/30. Abnormal.    Decreased to 19.2 on 2/3 with left shift of 15.9. Further decreased to 18.2 on 2/4.  2. Pro calcitonin 0.78 on 2/3.  3. Low-grade temp of 99 today, with source possibly being the noted infiltrate on the chest x-ray from 2/1.  8. Nutrition:  Consultation to dietician for nutritional counseling and recommendations. 1. TotP 6.4, alb 2.5, Vitamin 25OH level of 23 on 2/1/2020.  2. Cholecalciferol 5000 IU daily. 9. Electrolytes. 1. Normal on 2/1 with exception of:  2. Hypomagnesemia of 1.5 on 2/1. Magnesium improved to 2.1 on 2/3.  3. Daily 400 mg of magnesium oxide ordered on 2/1. Starting on 2/5 will dose magnesium every other day. 10. Bladder: No issues  11. Bowel: Senna, colace, MOM  12. Rehabilitation nursing will be involved for bowel, bladder, skin, and pain management. Nursing will also provide education and training to patient and family. 13. Prophylaxis:  DVT: Eliquis. GI: None. 14.  and case management consultations for coordination of care and discharge planning. Chronic Problems:  1. Hypertension. 1. Lasix 20 mg twice daily. 1. 20 mEq of potassium twice daily. 2. Cardizem CD. 3. Toprol-XL. 2. Hyperlipidemia. 1. Simvastatin. 3. Hypothyroidism. 1. Levothyroxine. 4. Incidental finding of complex left renal lesion. 1. Nephrology will follow up with the patient in the office for further work-up. 5. Obstructive sleep apnea with use of CPAP. 1. Fortunately  6. History of seizures with the last occurring over 20 years ago. 1. Keppra. 7. History of vitamin D deficiency. 1. Vitamin D 25-hydroxy was 23 on 2/1.  8. Morbid obesity; BMI of 44. 4.     Labs reviewed on:  1. 1/31.  2. 2/1.  3. 2/3.  4. 2/4.     Infectious Disease:  1. Cefepime. 2. Contact isolation for VRE.     Missed Therapy Time:  None

## 2020-02-04 NOTE — PROGRESS NOTES
[facility]  Diagnosis List for Inpatient Rehab facility (IRF) - Patient Assessment Instrument (GOGO)    Patient Name: Gigi Saha        MRN: [id number]    : 1955  (66 y.o.)  Gender: female     Primary impairment requiring rehabilitation: 8.61 Status Post Unilateral Knee Replacement     Etiologic Diagnosis that led to the condition: Osteoarthritis of the left knee    Comorbid conditions affecting rehabilitation:  1. Status post total knee arthroplasty by Dr. Reggie Barnes on 2020.  2. Gait instability. 3. Acute hypoxemic respiratory failure. 4. Healthcare associated pneumonia. 5. Antibiotic associated diarrhea. 6. Atrial fibrillation with rapid ventricular response. 7. *Acute kidney injury. 8. Excessive use of non steroidal anti inflammatory medications. 9. Leukocytosis. 10. Hypomagnesemia. 11. Hypertension. 12. Hyperlipidemia. 13. Hypothyroidism. 14. Incidental finding of complex left renal lesion. 15. Obstructive sleep apnea with use of CPAP. 16. History of seizures with the last occurring over 20 years ago. 17. History of vitamin D deficiency. 18. *Morbid obesity; BMI of 44.4. 19. Hypovitaminosis D.   20. Reactive thrombocytosis.     Verito Renee MD

## 2020-02-04 NOTE — PROGRESS NOTES
required for pt to return home alone. Long term goal 2: Pt will complete ADL routine including shower t/f with Hannah to increase indep with self care for return home alone. Following session, patient left in safe position with all fall risk precautions in place.     [facility] BOOTH/L [id number]

## 2020-02-04 NOTE — PROGRESS NOTES
INTERNAL MEDICINE Progress Note  2/3/2020 9:47 PM  Subjective:   Admit Date: 1/31/2020  PCP: Evelin Sibley DO  Interval History: On rehab bed. No SOB, no chest pain, CTA chest negative for PE  no cough,     Objective:   Vitals: /68   Pulse 83   Temp 98.2 °F (36.8 °C) (Oral)   Resp 16   Ht 4' 11\" (1.499 m)   Wt 218 lb (98.9 kg)   SpO2 93%   BMI 44.03 kg/m²   General appearance: alert and cooperative with exam  HEENT:  atraumatic  Neck:  no JVD  Lungs: improved BS boris  Heart: S1, S2 normal  Abdomen: soft, non-tender; bowel sounds normal; no masses,  no organomegaly  Extremities: dressing left knee  Neurologic:  Alert, oriented, thought content appropriate      Medications:   Scheduled Meds:   sodium chloride flush  10 mL Intravenous BID    lactobacillus  1 capsule Oral BID WC    [START ON 2/5/2020] magnesium oxide  400 mg Oral Every Other Day    cefepime  2 g Intravenous Q8H    Vitamin D  5,000 Units Oral Daily    apixaban  5 mg Oral BID    calcium acetate  667 mg Oral BID    diltiazem  120 mg Oral Daily    furosemide  20 mg Oral BID    levETIRAcetam  1,000 mg Oral BID    levothyroxine  25 mcg Oral Daily    metoprolol succinate  50 mg Oral Daily    potassium chloride  20 mEq Oral BID WC    simvastatin  80 mg Oral Nightly     Continuous Infusions:      Lab Results:   CBC:   Recent Labs     02/01/20  0937 02/02/20  0629 02/03/20  0614   WBC 22.0* 23.0* 19.2*   HGB 12.5 11.2* 10.1*    314 340     BMP:    Recent Labs     02/01/20  0937 02/03/20  0614    139   K 4.0 4.7   CL 97* 102   CO2 22* 23   BUN 22 29*   CREATININE 0.9 0.7   GLUCOSE 136* 101     TSH:    Lab Results   Component Value Date    TSH 1.140 01/27/2020     TTE   Technically difficult study.   Ejection fraction was estimated at 55-60%.   There was trace tricuspid regurgitation.   Assuming RAP = 5 mmHg, the estimated RVSP = 49 mmHg.   Ascending aorta = 2.6 cm.    IVC not welll seen.     Renal USS  Minimal

## 2020-02-05 ENCOUNTER — APPOINTMENT (OUTPATIENT)
Dept: GENERAL RADIOLOGY | Age: 65
DRG: 559 | End: 2020-02-05
Attending: PHYSICAL MEDICINE & REHABILITATION
Payer: MEDICARE

## 2020-02-05 LAB
BASOPHILS # BLD: 0.5 %
BASOPHILS ABSOLUTE: 0.1 THOU/MM3 (ref 0–0.1)
EOSINOPHIL # BLD: 1 %
EOSINOPHILS ABSOLUTE: 0.2 THOU/MM3 (ref 0–0.4)
ERYTHROCYTE [DISTWIDTH] IN BLOOD BY AUTOMATED COUNT: 14.2 % (ref 11.5–14.5)
ERYTHROCYTE [DISTWIDTH] IN BLOOD BY AUTOMATED COUNT: 47.2 FL (ref 35–45)
HCT VFR BLD CALC: 31.1 % (ref 37–47)
HEMOGLOBIN: 9.8 GM/DL (ref 12–16)
IMMATURE GRANS (ABS): 0.51 THOU/MM3 (ref 0–0.07)
IMMATURE GRANULOCYTES: 2.9 %
LYMPHOCYTES # BLD: 11.7 %
LYMPHOCYTES ABSOLUTE: 2.1 THOU/MM3 (ref 1–4.8)
MAGNESIUM: 2.1 MG/DL (ref 1.6–2.4)
MCH RBC QN AUTO: 28.9 PG (ref 26–33)
MCHC RBC AUTO-ENTMCNC: 31.5 GM/DL (ref 32.2–35.5)
MCV RBC AUTO: 91.7 FL (ref 81–99)
MONOCYTES # BLD: 6.4 %
MONOCYTES ABSOLUTE: 1.1 THOU/MM3 (ref 0.4–1.3)
NUCLEATED RED BLOOD CELLS: 0 /100 WBC
PLATELET # BLD: 616 THOU/MM3 (ref 130–400)
PLATELET ESTIMATE: ABNORMAL
PMV BLD AUTO: 9.6 FL (ref 9.4–12.4)
RBC # BLD: 3.39 MILL/MM3 (ref 4.2–5.4)
SCAN OF BLOOD SMEAR: NORMAL
SEG NEUTROPHILS: 77.5 %
SEGMENTED NEUTROPHILS ABSOLUTE COUNT: 13.7 THOU/MM3 (ref 1.8–7.7)
WBC # BLD: 17.7 THOU/MM3 (ref 4.8–10.8)

## 2020-02-05 PROCEDURE — 83735 ASSAY OF MAGNESIUM: CPT

## 2020-02-05 PROCEDURE — 97110 THERAPEUTIC EXERCISES: CPT

## 2020-02-05 PROCEDURE — 36415 COLL VENOUS BLD VENIPUNCTURE: CPT

## 2020-02-05 PROCEDURE — 97116 GAIT TRAINING THERAPY: CPT

## 2020-02-05 PROCEDURE — 6370000000 HC RX 637 (ALT 250 FOR IP): Performed by: INTERNAL MEDICINE

## 2020-02-05 PROCEDURE — C1751 CATH, INF, PER/CENT/MIDLINE: HCPCS

## 2020-02-05 PROCEDURE — 1180000000 HC REHAB R&B

## 2020-02-05 PROCEDURE — 71046 X-RAY EXAM CHEST 2 VIEWS: CPT

## 2020-02-05 PROCEDURE — 97530 THERAPEUTIC ACTIVITIES: CPT

## 2020-02-05 PROCEDURE — 6360000002 HC RX W HCPCS: Performed by: PHYSICAL MEDICINE & REHABILITATION

## 2020-02-05 PROCEDURE — 85025 COMPLETE CBC W/AUTO DIFF WBC: CPT

## 2020-02-05 PROCEDURE — 97535 SELF CARE MNGMENT TRAINING: CPT

## 2020-02-05 PROCEDURE — 6370000000 HC RX 637 (ALT 250 FOR IP): Performed by: PHYSICAL MEDICINE & REHABILITATION

## 2020-02-05 PROCEDURE — 76937 US GUIDE VASCULAR ACCESS: CPT

## 2020-02-05 PROCEDURE — 2580000003 HC RX 258: Performed by: PHYSICAL MEDICINE & REHABILITATION

## 2020-02-05 RX ORDER — SODIUM CHLORIDE 0.9 % (FLUSH) 0.9 %
10 SYRINGE (ML) INJECTION PRN
Status: DISCONTINUED | OUTPATIENT
Start: 2020-02-05 | End: 2020-02-09 | Stop reason: HOSPADM

## 2020-02-05 RX ORDER — SODIUM CHLORIDE 0.9 % (FLUSH) 0.9 %
10 SYRINGE (ML) INJECTION EVERY 12 HOURS SCHEDULED
Status: DISCONTINUED | OUTPATIENT
Start: 2020-02-05 | End: 2020-02-09 | Stop reason: HOSPADM

## 2020-02-05 RX ORDER — LIDOCAINE HYDROCHLORIDE 10 MG/ML
5 INJECTION, SOLUTION EPIDURAL; INFILTRATION; INTRACAUDAL; PERINEURAL ONCE
Status: DISCONTINUED | OUTPATIENT
Start: 2020-02-05 | End: 2020-02-06

## 2020-02-05 RX ADMIN — Medication 1 CAPSULE: at 07:49

## 2020-02-05 RX ADMIN — APIXABAN 5 MG: 5 TABLET, FILM COATED ORAL at 19:51

## 2020-02-05 RX ADMIN — METOPROLOL SUCCINATE 50 MG: 50 TABLET, EXTENDED RELEASE ORAL at 07:48

## 2020-02-05 RX ADMIN — VITAMIN D, TAB 1000IU (100/BT) 5000 UNITS: 25 TAB at 07:49

## 2020-02-05 RX ADMIN — SODIUM CHLORIDE, PRESERVATIVE FREE 10 ML: 5 INJECTION INTRAVENOUS at 19:52

## 2020-02-05 RX ADMIN — APIXABAN 5 MG: 5 TABLET, FILM COATED ORAL at 07:48

## 2020-02-05 RX ADMIN — FUROSEMIDE 20 MG: 20 TABLET ORAL at 11:58

## 2020-02-05 RX ADMIN — POTASSIUM CHLORIDE 20 MEQ: 1500 TABLET, EXTENDED RELEASE ORAL at 17:24

## 2020-02-05 RX ADMIN — CEFEPIME 2 G: 2 INJECTION, POWDER, FOR SOLUTION INTRAMUSCULAR; INTRAVENOUS at 12:00

## 2020-02-05 RX ADMIN — Medication 1 CAPSULE: at 17:24

## 2020-02-05 RX ADMIN — LEVOTHYROXINE SODIUM 25 MCG: 25 TABLET ORAL at 06:40

## 2020-02-05 RX ADMIN — LOPERAMIDE HYDROCHLORIDE 2 MG: 2 CAPSULE ORAL at 09:08

## 2020-02-05 RX ADMIN — ACETAMINOPHEN 650 MG: 325 TABLET ORAL at 06:42

## 2020-02-05 RX ADMIN — CEFEPIME 2 G: 2 INJECTION, POWDER, FOR SOLUTION INTRAMUSCULAR; INTRAVENOUS at 19:51

## 2020-02-05 RX ADMIN — POTASSIUM CHLORIDE 20 MEQ: 1500 TABLET, EXTENDED RELEASE ORAL at 07:49

## 2020-02-05 RX ADMIN — LEVETIRACETAM 1000 MG: 500 TABLET, FILM COATED ORAL at 07:48

## 2020-02-05 RX ADMIN — CALCIUM ACETATE 667 MG: 667 CAPSULE ORAL at 07:48

## 2020-02-05 RX ADMIN — FUROSEMIDE 20 MG: 20 TABLET ORAL at 06:40

## 2020-02-05 RX ADMIN — SIMVASTATIN 80 MG: 40 TABLET, FILM COATED ORAL at 19:51

## 2020-02-05 RX ADMIN — LEVETIRACETAM 1000 MG: 500 TABLET, FILM COATED ORAL at 19:52

## 2020-02-05 RX ADMIN — MAGNESIUM GLUCONATE 500 MG ORAL TABLET 400 MG: 500 TABLET ORAL at 07:48

## 2020-02-05 RX ADMIN — DILTIAZEM HYDROCHLORIDE 120 MG: 120 CAPSULE, COATED, EXTENDED RELEASE ORAL at 07:48

## 2020-02-05 ASSESSMENT — ENCOUNTER SYMPTOMS
ALLERGIC/IMMUNOLOGIC NEGATIVE: 1
CHEST TIGHTNESS: 0
EYE PAIN: 0
NAUSEA: 0
SHORTNESS OF BREATH: 0
SORE THROAT: 0
DIARRHEA: 0
TROUBLE SWALLOWING: 0
CONSTIPATION: 0
BLOOD IN STOOL: 0
BACK PAIN: 0

## 2020-02-05 ASSESSMENT — PAIN SCALES - GENERAL
PAINLEVEL_OUTOF10: 0
PAINLEVEL_OUTOF10: 0
PAINLEVEL_OUTOF10: 2

## 2020-02-05 NOTE — PROGRESS NOTES
[facility]  INPATIENT PHYSICAL THERAPY  DAILY NOTE  [facility] - [id number]-A    Time In: 1330  Time Out: 1400  Timed Code Treatment Minutes: 30 Minutes  Minutes: 30          Date: 2020  Patient Name: Gemma Pratt,  Gender:  female        MRN: [id number]  : 1955  (75 y.o.)     Referring Practitioner: KIM Bass MD  Diagnosis: S/P total knee arthroplasty, left  Additional Pertinent Hx: Per admitting MD note on 20, patient is a 75 y.o. female who [facility]. She was recuperating well when she suddenly became hypoxemic, hypotensive and flipped into a-fib with RVR-HR in the 150s today. She was bolused with IVF and transferred to acute bed at [facility] for further care. To [facility] on      Prior Level of Function:  Lives With: Alone  Type of Home: House  Home Layout: One level  Home Access: Stairs to enter with rails  Entrance Stairs - Number of Steps: 2+1  Entrance Stairs - Rails: Left  Home Equipment: Edelmira Lang, Reacher, Sock aid, Long-handled shoehorn, Standard walker   Bathroom Shower/Tub: Tub/Shower unit  Bathroom Toilet: Standard  Bathroom Equipment: 3-in-1 commode    Receives Help From: Family  ADL Assistance: Independent  Homemaking Assistance: Independent  Homemaking Responsibilities: Yes  Ambulation Assistance: Independent  Transfer Assistance: Independent  Active : Yes  IADL Comments: 3 children live nearby and able to check in on patient and assist as needed. Neighbor Dex [employer]) will be able to help at discharge as well. (Daughter is a BOOTH). Additional Comments: Pt stating she was completely indep at home with ADL tasks and IADL tasks PTA, no AD PTA    Restrictions/Precautions:  Restrictions/Precautions: General Precautions, Fall Risk, Contact Precautions, Weight Bearing  Left Lower Extremity Weight Bearing: Weight Bearing As Tolerated    SUBJECTIVE: pt in recliner upon arrival and agrees to therapy.  Pt pleasant and cooperative. Requests to use restroom during session. Pt did inquire about how to get RW in and out of the car the correct way if she were to be on her own. Pt did request to ride NuStep to loosen up her knee. PAIN: L knee \"much bettter\"    OBJECTIVE:    Transfers:  Sit to Stand: Stand By Assistance, Contact Guard Assistance  Stand to Sit:Stand By Assistance, [address] [facility] By Assistance, with increased time for completion, with verbal cues  Extra time spent problem solving how to manage RW if pt were alone when getting in and out of drivers seat of car. Had pt sit down, fold up RW, and pull inside car across her lap and into passenger seat. And vice versa when exiting. Completed this method 2x. Pt needing extra time and cueing for technique with RW. Recommend  Continued practice with this task. Did talk through placing RW into back seat then amb to front seat however did not attempt with pt. Recommend practice with side stepping without RW prior to practicing this. Ambulation:  Stand By Assistance, [employer] Assistance  Distance: [id number] and [id number]  Surface: Level Tile  Device:Rolling Walker  Gait Deviations: Forward Flexed Posture, Slow Rayna, Decreased Step Length Bilaterally, Decreased Gait Speed, Decreased Heel Strike Bilaterally, Wide Base of Support, Mild Path Deviations and BLEs ER    Exercise:  NuStep level 1x6 mins for improved ROM to L knee and tolerance to mobility. pt reporting that her knee feels more loose after completion. Functional Outcome Measures: Completed       ASSESSMENT:  Assessment: Patient progressing toward established goals. Activity Tolerance:  Patient tolerance of  treatment: good. Pt tolerated session well. Pt demosw decreased stability on feet,ROM, strength and independence with functional tasks.  Pt will benefit from con PT at this [facility]     Equipment Recommendations:Equipment Needed: Yes(RW ordered from [facility] 2/4/20)  Discharge Recommendations: Continue to assess pending progress    Plan: Times per week: 5x/wk 90min, 1x/wk 30min  Current Treatment Recommendations: Strengthening, Gait Training, Patient/Caregiver Education & Training, ROM, Functional Mobility Training, Endurance Training, Transfer Training, Safety Education & Training, Home Exercise Program, Equipment Evaluation, Education, & procurement, ADL/Self-care Training, Stair training, Balance Training    Patient Education  Patient Education: Plan of Care, car, gait, transfers    Goals:  Patient goals : go home  Short term goals  Time Frame for Short term goals: 1 week  Short term goal 1: Pt to be S for supine <> sit to get in/out of bed  Short term goal 2: Pt to be S for sit <> stand to get up to ambulate  Short term goal 3: Pt to ambulate >100 ft with RW with Supervision for household distances  Short term goal 4: Pt to negotiate 2+ steps with L ascending rail at [facility], curb step at [facility] w/ RW for home access  Short term goal 5: Pt time on TUG will improve to <=17 seconds with use of RW to decrease risk of falls  Short term goal 6: Pt AROM of L knee will improve to range from 0-95 degrees to allow for improved gait mechanics  Long term goals  Time Frame for Long term goals : 2 weeks  Long term goal 1: Pt to be mod I for supine <> sit to get in/out of bed  Long term goal 2: Pt to be mod I for sit <> stand to get up to ambulate  Long term goal 3: Pt to ambulate >=150 ft with RW at mod I for household distances  Long term goal 4: Pt to negotiate 2+ steps with L ascending rail at [facility], curb step at [facility] w/ RW for home access  Long term goal 5: Pt to perform car transfer at [facility] I for transport needs  Long term goal 6: Pt time on TUG will improve to <=14 seconds with use of RW to decrease risk of falls    Following session, patient left in safe position with all fall risk precautions in place.

## 2020-02-05 NOTE — PLAN OF CARE
Problem: DISCHARGE BARRIERS  Goal: Patient's continuum of care needs are met  2/5/2020 1100 by REYNA Batista  Note:   Team conference held Tuesday, 02/04/2020. Recommendations of the team were explained to the patient by REYNA Sewell, and Dr. Chris Neil. Team is recommending that patient continue on acute inpatient rehab for five more days, with expected discharge date of Sunday, 02/09/2020. Plan for discharge on Sunday, 02/09/2020 due patient's need for continued IV antibiotics treatment. Following discharge, team is recommending continued PT as outpatient. Patient requesting to complete outpatient PT through [facility]. Patient does not wish to take CPM machine home at discharge. Care plan reviewed with patient. Patient verbalized understanding of the plan of care and contributed to goal setting. SW to follow and maintain involvement in discharge planning.

## 2020-02-05 NOTE — PROGRESS NOTES
Pt. Awake at 0730 and slightly tearful  Due to a death of a brother in law. Emotional support given . Discussed possible midline picc placement and pt.; agreeable   To. Dressing change to left knee with ace raps reapplied after am bath. Ice used intermittently to left knee. Pt. Had soft formed stools when first got up that changed to loose during her shower after breakfast. Imodium given and pt. Voices she does get diahhrea with  Eating corn and she had that yesterday.

## 2020-02-05 NOTE — PROGRESS NOTES
MOBILITY:  Not Tested    TRANSFERS:  Sit to Stand:  Contact Guard Assistance. Stand to Sit: Contact Guard Assistance. FUNCTIONAL MOBILITY:  Assistive Device: Rolling Walker  Assist Level:  Contact Guard Assistance. Distance: To and from bathroom and To and from shower room          ADDITIONAL ACTIVITIES:  Pt completed clothing retieval     ASSESSMENT:     Activity Tolerance:  Patient tolerance of  treatment: good. Discharge Recommendations: Continue to assess pending progress, Home with assist PRN  Equipment Recommendations: Equipment Needed: No  Other: Pt has LHAE if needed. Pt reports daughter is buying her a shower chair and grab bars to install in shower and near toilet. Plan: Times per week: 5x/wk x90 min and 1x/wk x 30 min  Plan weeks: 3  Current Treatment Recommendations: Endurance Training, Patient/Caregiver Education & Training, Balance Training, Functional Mobility Training, Safety Education & Training, Self-Care / ADL, Home Management Training    Patient Education  Patient Education: ADL's and IADL's    Goals  Short term goals  Time Frame for Short term goals: 2 weeks  Short term goal 1: Pt to increase endurance to be able to navigate [facility] distances requiring in home (approx 25 feet) using AD with SBA, no increase in SOB or need for rest breaks to complete simple homemaking tasks and ADLs  Short term goal 2: Pt to don/doff LB clothing including ALEJANDRA hose using LHAE or compensatory tech with CGA to increase indep with self care. Short term goal 3: Pt to increase standing endurance to 3 min with SBA, and no UE support to complete simple meal prep. Short term goal 4: Pt to complete toilet hygiene with adaptive strategies and SBA to increase indep with wiping bottom during toileting routine. Long term goals  Time Frame for Long term goals : 3 weeks  Long term goal 1: Pt will complete light meal prep with Hannah to restore PLOF required for pt to return home alone.   Long term goal 2: Pt will

## 2020-02-05 NOTE — PROGRESS NOTES
[facility]  INPATIENT PHYSICAL THERAPY  DAILY NOTE  [address] [facility] - 7E-53/053-A    Time In: 9778  Time Out: 1045  Timed Code Treatment Minutes: 60 Minutes  Minutes: 60          Date: 2020  Patient Name: Gemma Rosariokeeper,  Gender:  female        MRN: [id number]  : 1955  (75 y.o.)     Referring Practitioner: KIM Bass MD  Diagnosis: S/P total knee arthroplasty, left  Additional Pertinent Hx: Per admitting MD note on 20, patient is a 75 y.o. female who had a left TKR done at [facility] 2 days ago. She was recuperating well when she suddenly became hypoxemic, hypotensive and flipped into a-fib with RVR-HR in the 150s today. She was bolused with IVF and transferred to acute bed at [facility] for further care. To [facility] on      Prior Level of Function:  Lives With: Alone  Type of Home: House  Home Layout: One level  Home Access: Stairs to enter with rails  Entrance Stairs - Number of Steps: 2+1  Entrance Stairs - Rails: Left  Home Equipment: Edelmira Lang, Reacher, Sock aid, Long-handled shoehorn, Standard walker   Bathroom Shower/Tub: Tub/Shower unit  Bathroom Toilet: Standard  Bathroom Equipment: 3-in-1 commode    Receives Help From: Family  ADL Assistance: Independent  Homemaking Assistance: Independent  Homemaking Responsibilities: Yes  Ambulation Assistance: Independent  Transfer Assistance: Independent  Active : Yes  IADL Comments: 3 children live nearby and able to check in on patient and assist as needed. Neighbor Dex De Souza) will be able to help at discharge as well. (Daughter is a BOOTH). Additional Comments: Pt stating she was completely indep at home with ADL tasks and IADL tasks PTA, no AD PTA    Restrictions/Precautions:  Restrictions/Precautions: General Precautions, Fall Risk, Contact Precautions, Weight Bearing  Left Lower Extremity Weight Bearing: Weight Bearing As Tolerated    SUBJECTIVE: Pt in recliner upon arrival and agrees to therapy.  Pt pleasant and cooperative. Incontinent of small amount of bowel during session. Extra time needed for clean up and clothing change. PAIN: 8/10: L knee. Ice on at beginning and at end of session    OBJECTIVE:  Bed Mobility:  Rolling to Left: Stand By Assistance   Supine to Sit: Stand By Assistance  Sit to Supine: Stand By Assistance   Scooting: Stand By Assistance    Transfers:  Sit to Stand: Stand By Assistance  Stand to Sit:Stand By Assistance  Car:Stand By Assistance, with increased time for completion, with verbal cues completed transferring in and out of drivers seat. Good technique, cue needed to get RLE out of the car as well before leaning forward to scoot out    Ambulation:  Stand By Assistance  Distance: [id number]  Surface: Level Tile  Device:Rolling Walker  Gait Deviations: Forward Flexed Posture, Slow Rayna, Decreased Step Length Bilaterally, Decreased Weight Shift Left, Decreased Gait Speed, Decreased Heel Strike Bilaterally and Decreased Terminal Knee Extension  Fatigued easily, rest breaks needed during session    Stairs:  Stand By Assistance, with cues for safety  Number of Steps: 6  Height: 6\" step with One Handrail  Good technique, 1 cue needed to ensure whole foot is on the step    Balance:  Dynamic Standing Balance: Contact Guard Assistance, Minimal Assistance  Pt needing A for functional tasks/ clean up in bathroom due to incontinence of bowel. Pt stood ~3-4mins with RW suppport with static/dyn tasks. Pt did need A to change clothing and complete care. Extra time needed    Exercise:  Patient was guided in 1 set(s) 15 reps of exercise to both lower extremities. Ankle pumps, Glut sets, Quad sets, Heelslides, Short arc quads, Hip abduction/adduction, Bridges and abd bracing, pt needed AA with LLE heel slides. Exercises were completed for increased independence with functional mobility.     Functional Outcome Measures: Completed       ASSESSMENT:  Assessment: Patient progressing toward established goals. Activity Tolerance:  Patient tolerance of  treatment: good. Pt tolerated session well. Pt demos decreased endurance, fatigues quickly during session, weakness and decreased ROM on LLE. Pt will benefit from con tPT at this time to improve overall mobility, to return to PLOF and ensure safety with DC.      Equipment Recommendations:Equipment Needed: Yes(RW ordered from E 2/4/20)  Discharge Recommendations:  Continue to assess pending progress    Plan: Times per week: 5x/wk 90min, 1x/wk 30min  Current Treatment Recommendations: Strengthening, Gait Training, Patient/Caregiver Education & Training, ROM, Functional Mobility Training, Endurance Training, Transfer Training, Safety Education & Training, Home Exercise Program, Equipment Evaluation, Education, & procurement, ADL/Self-care Training, Stair training, Balance Training    Patient Education  Patient Education: Plan of Care, [employer] Mobility, Transfers, Gait, Stairs, Car Transfers, Verbal Exercise Instruction    Goals:  Patient goals : go home  Short term goals  Time Frame for Short term goals: 1 week  Short term goal 1: Pt to be S for supine <> sit to get in/out of bed  Short term goal 2: Pt to be S for sit <> stand to get up to ambulate  Short term goal 3: Pt to ambulate >100 ft with RW with Supervision for household distances  Short term goal 4: Pt to negotiate 2+ steps with L ascending rail at [facility], curb step at [facility] w/ RW for home access  Short term goal 5: Pt time on TUG will improve to <=17 seconds with use of RW to decrease risk of falls  Short term goal 6: Pt AROM of L knee will improve to range from 0-95 degrees to allow for improved gait mechanics  Long term goals  Time Frame for Long term goals : 2 weeks  Long term goal 1: Pt to be mod I for supine <> sit to get in/out of bed  Long term goal 2: Pt to be mod I for sit <> stand to get up to ambulate  Long term goal 3: Pt to ambulate >=150 ft with RW at mod I for household distances  Long term goal 4: Pt to negotiate 2+ steps with L ascending rail at mod I, curb step at Mod I w/ RW for home access  Long term goal 5: Pt to perform car transfer at mod I for transport needs  Long term goal 6: Pt time on TUG will improve to <=14 seconds with use of RW to decrease risk of falls    Following session, patient left in safe position with all fall risk precautions in place.

## 2020-02-05 NOTE — PROGRESS NOTES
Physical Medicine & Rehabilitation  Progress Note    Chief Complaint:  s/p LEFT total knee arthroplasty/HAP    Subjective: \"I feel good. \"  She states that she has used the CPM today to a setting of 70 degrees. Current pain is rated at 2/10. Patient informed that after discussion with nephrology her PhosLo can be stopped and she will follow-up with nephrology after discharge as was already planned. Patient had no other specific questions at this time. Rehabilitation:   Physical Therapy:  OBJECTIVE:     Transfers:  Sit to Stand: Stand By Assistance, [employer] Assistance  Stand to Sit:Stand By Assistance, [facility],  By Assistance, with increased time for completion, with verbal cues  Extra time spent problem solving how to manage RW if pt were alone when getting in and out of drivers seat of car. Had pt sit down, fold up RW, and pull inside car across her lap and into passenger seat. And vice versa when exiting. Completed this method 2x. Pt needing extra time and cueing for technique with RW. Recommend  Continued practice with this task. Did talk through placing RW into back seat then amb to front seat however did not attempt with pt. Recommend practice with side stepping without RW prior to practicing this.     Ambulation:  Stand By Assistance, Contact Guard Assistance  Distance: [id number] and [id number]  Surface: Level Tile  Device:Rolling Walker  Gait Deviations: Forward Flexed Posture, Slow Rayna, Decreased Step Length Bilaterally, Decreased Gait Speed, Decreased Heel Strike Bilaterally, Wide Base of Support, Mild Path Deviations and BLEs ER     Exercise:  NuStep level 1x6 mins for improved ROM to L knee and tolerance to mobility. pt reporting that her knee feels more loose after completion. Functional Outcome Measures: Completed     ASSESSMENT:  Assessment: Patient progressing toward established goals. Activity Tolerance:  Patient tolerance of  treatment: good.  Pt tolerated normal  Attention/Concentration: normal  Language:  normal     ROM:  abnormal - LEFT TKA  Motor Exam:  Motor exam is 5 out of 5 all extremities with the exception of LEFT leg not tested     Tone:  Normal, LEFT leg not testable  Muscle bulk: within normal limits  Sensory:  Sensory intact  Coordination:   normal  Deep Tendon Reflexes:  Reflexes are intact and symmetrical bilaterally, with LEFT patella and achilles MSR not testted     Skin: warm and dry, no rash or erythema  Peripheral vascular: Pulses: Normal upper and lower extremity pulses; Edema: 1+    Diagnostics:   Recent Results (from the past 24 hour(s))   CBC Auto Differential    Collection Time: 02/05/20  7:08 AM   Result Value Ref Range    WBC 17.7 (H) 4.8 - 10.8 thou/mm3    RBC 3.39 (L) 4.20 - 5.40 mill/mm3    Hemoglobin 9.8 (L) 12.0 - 16.0 gm/dl    Hematocrit 31.1 (L) 37.0 - 47.0 %    MCV 91.7 81.0 - 99.0 fL    MCH 28.9 26.0 - 33.0 pg    MCHC 31.5 (L) 32.2 - 35.5 gm/dl    RDW-CV 14.2 11.5 - 14.5 %    RDW-SD 47.2 (H) 35.0 - 45.0 fL    Platelets 999 (H) 236 - 400 thou/mm3    MPV 9.6 9.4 - 12.4 fL    Seg Neutrophils 77.5 %    Lymphocytes 11.7 %    Monocytes 6.4 %    Eosinophils 1.0 %    Basophils 0.5 %    Immature Granulocytes 2.9 %    Platelet Estimate INCREASED Adequate    Segs Absolute 13.7 (H) 1.8 - 7.7 thou/mm3    Lymphocytes Absolute 2.1 1.0 - 4.8 thou/mm3    Monocytes Absolute 1.1 0.4 - 1.3 thou/mm3    Eosinophils Absolute 0.2 0.0 - 0.4 thou/mm3    Basophils Absolute 0.1 0.0 - 0.1 thou/mm3    Immature Grans (Abs) 0.51 (H) 0.00 - 0.07 thou/mm3    nRBC 0 /100 wbc   Magnesium    Collection Time: 02/05/20  7:08 AM   Result Value Ref Range    Magnesium 2.1 1.6 - 2.4 mg/dL   Scan of Blood Smear    Collection Time: 02/05/20  7:08 AM   Result Value Ref Range    SCAN OF BLOOD SMEAR see below      Labs Renal Latest Ref Rng & Units 2/3/2020 2/1/2020 1/31/2020 1/30/2020 1/29/2020   BUN 7 - 22 mg/dL 29(H) 22 25(H) 26(H) 29(H)   Cr 0.4 - 1.2 mg/dL 0.7 0.9 1.0 1.0 1.1   K 3.5 - 5.2 meq/L 4.7 4.0 4.0 4.5 3.7   Na 135 - 145 meq/L 139 135 138 140 140      Recent Labs     02/05/20  0708 02/04/20  0549 02/03/20  0614   WBC 17.7* 18.2* 19.2*   HGB 9.8* 10.2* 10.1*   HCT 31.1* 32.6* 33.2*   MCV 91.7 91.1 92.5   * 480* 340      Impression:  1. Status post total knee arthroplasty by Dr. Saba Chau on 1/24/2020.  2. Gait instability. 3. Acute hypoxemic respiratory failure. 4. Healthcare associated pneumonia. 5. Antibiotic associated diarrhea. 6. Atrial fibrillation with rapid ventricular response. 7. Acute kidney injury. 8. Excessive use of non steroidal anti inflammatory medications. 9. Leukocytosis. 10. Hypomagnesemia. 11. Hypertension. 12. Hyperlipidemia. 13. Hypothyroidism. 14. Incidental finding of complex left renal lesion. 15. Obstructive sleep apnea with use of CPAP. 16. History of seizures with the last occurring over 20 years ago. 17. History of vitamin D deficiency. 18. Morbid obesity; BMI of 44.4. 19. Hypovitaminosis D. Plan:     Medical management: Per primary team and Dr. Dayanara Jordan.     Consultants: Internal Medicine, Nephrology, Cardiology, Orthopedic Surgery, Physical Medicine     Narcotic usage: Norco last 96 hour usage None. Stable. Last BM:  Stool Amount: Medium (02/05/20 0906)    FUNCTIONAL OUTCOMES TOOLS:    HANSEN -      Tinetti -      TUG - Timed Up and Go: 18    Acute/Rehabilitation Problems:  1. Status post total knee arthroplasty by Dr. Saba Chau on 1/24/2020.  1. Continue using CPM as directed by orthopedic surgeon. 2. Wound care as directed. 3. Pain control. 1. Norco.  2. Tylenol. 3. Consider Lidoderm patches if additional basal pain control is needed. Defer for now. 4. Continue with CPM.  Patient currently at 70 degrees on 2/5.  2. Gait instability. 1. PT/OT. 2. TUG 13 seconds on 2/1 and slight decrease to 18 seconds on 2/4.  60-69 years:  8.1 seconds; 70-79 years: 9.2 seconds; 80-99 years: 11.3 seconds.    3. Acute hypoxemic

## 2020-02-06 PROBLEM — J96.01 ACUTE RESPIRATORY FAILURE WITH HYPOXEMIA (HCC): Status: RESOLVED | Noted: 2020-01-26 | Resolved: 2020-02-06

## 2020-02-06 LAB
ERYTHROCYTE [DISTWIDTH] IN BLOOD BY AUTOMATED COUNT: 14 % (ref 11.5–14.5)
ERYTHROCYTE [DISTWIDTH] IN BLOOD BY AUTOMATED COUNT: 47.8 FL (ref 35–45)
HCT VFR BLD CALC: 30.2 % (ref 37–47)
HEMOGLOBIN: 9.3 GM/DL (ref 12–16)
MCH RBC QN AUTO: 28.5 PG (ref 26–33)
MCHC RBC AUTO-ENTMCNC: 30.8 GM/DL (ref 32.2–35.5)
MCV RBC AUTO: 92.6 FL (ref 81–99)
PLATELET # BLD: 679 THOU/MM3 (ref 130–400)
PMV BLD AUTO: 9.3 FL (ref 9.4–12.4)
RBC # BLD: 3.26 MILL/MM3 (ref 4.2–5.4)
WBC # BLD: 15.2 THOU/MM3 (ref 4.8–10.8)

## 2020-02-06 PROCEDURE — 6370000000 HC RX 637 (ALT 250 FOR IP): Performed by: PHYSICAL MEDICINE & REHABILITATION

## 2020-02-06 PROCEDURE — 6360000002 HC RX W HCPCS: Performed by: PHYSICAL MEDICINE & REHABILITATION

## 2020-02-06 PROCEDURE — 1180000000 HC REHAB R&B

## 2020-02-06 PROCEDURE — 36415 COLL VENOUS BLD VENIPUNCTURE: CPT

## 2020-02-06 PROCEDURE — 97110 THERAPEUTIC EXERCISES: CPT

## 2020-02-06 PROCEDURE — 97530 THERAPEUTIC ACTIVITIES: CPT

## 2020-02-06 PROCEDURE — 97535 SELF CARE MNGMENT TRAINING: CPT

## 2020-02-06 PROCEDURE — 2580000003 HC RX 258: Performed by: PHYSICAL MEDICINE & REHABILITATION

## 2020-02-06 PROCEDURE — 85027 COMPLETE CBC AUTOMATED: CPT

## 2020-02-06 PROCEDURE — 6370000000 HC RX 637 (ALT 250 FOR IP): Performed by: INTERNAL MEDICINE

## 2020-02-06 PROCEDURE — 97116 GAIT TRAINING THERAPY: CPT

## 2020-02-06 RX ADMIN — SODIUM CHLORIDE, PRESERVATIVE FREE 10 ML: 5 INJECTION INTRAVENOUS at 04:33

## 2020-02-06 RX ADMIN — LEVOTHYROXINE SODIUM 25 MCG: 25 TABLET ORAL at 06:21

## 2020-02-06 RX ADMIN — LEVETIRACETAM 1000 MG: 500 TABLET, FILM COATED ORAL at 20:12

## 2020-02-06 RX ADMIN — METOPROLOL SUCCINATE 50 MG: 50 TABLET, EXTENDED RELEASE ORAL at 08:36

## 2020-02-06 RX ADMIN — CEFEPIME 2 G: 2 INJECTION, POWDER, FOR SOLUTION INTRAMUSCULAR; INTRAVENOUS at 20:13

## 2020-02-06 RX ADMIN — SIMVASTATIN 80 MG: 40 TABLET, FILM COATED ORAL at 20:11

## 2020-02-06 RX ADMIN — APIXABAN 5 MG: 5 TABLET, FILM COATED ORAL at 22:00

## 2020-02-06 RX ADMIN — ACETAMINOPHEN 650 MG: 325 TABLET ORAL at 07:30

## 2020-02-06 RX ADMIN — Medication 1 CAPSULE: at 08:38

## 2020-02-06 RX ADMIN — LEVETIRACETAM 1000 MG: 500 TABLET, FILM COATED ORAL at 08:33

## 2020-02-06 RX ADMIN — POTASSIUM CHLORIDE 20 MEQ: 1500 TABLET, EXTENDED RELEASE ORAL at 17:42

## 2020-02-06 RX ADMIN — DILTIAZEM HYDROCHLORIDE 120 MG: 120 CAPSULE, COATED, EXTENDED RELEASE ORAL at 08:37

## 2020-02-06 RX ADMIN — APIXABAN 5 MG: 5 TABLET, FILM COATED ORAL at 08:37

## 2020-02-06 RX ADMIN — SODIUM CHLORIDE, PRESERVATIVE FREE 10 ML: 5 INJECTION INTRAVENOUS at 12:41

## 2020-02-06 RX ADMIN — CEFEPIME 2 G: 2 INJECTION, POWDER, FOR SOLUTION INTRAMUSCULAR; INTRAVENOUS at 04:33

## 2020-02-06 RX ADMIN — POTASSIUM CHLORIDE 20 MEQ: 1500 TABLET, EXTENDED RELEASE ORAL at 08:38

## 2020-02-06 RX ADMIN — VITAMIN D, TAB 1000IU (100/BT) 5000 UNITS: 25 TAB at 08:32

## 2020-02-06 RX ADMIN — FUROSEMIDE 20 MG: 20 TABLET ORAL at 12:40

## 2020-02-06 RX ADMIN — Medication 1 CAPSULE: at 17:42

## 2020-02-06 RX ADMIN — FUROSEMIDE 20 MG: 20 TABLET ORAL at 06:20

## 2020-02-06 RX ADMIN — ACETAMINOPHEN 650 MG: 325 TABLET ORAL at 14:36

## 2020-02-06 RX ADMIN — SODIUM CHLORIDE, PRESERVATIVE FREE 10 ML: 5 INJECTION INTRAVENOUS at 20:13

## 2020-02-06 RX ADMIN — CEFEPIME 2 G: 2 INJECTION, POWDER, FOR SOLUTION INTRAMUSCULAR; INTRAVENOUS at 12:38

## 2020-02-06 ASSESSMENT — PAIN DESCRIPTION - PROGRESSION: CLINICAL_PROGRESSION: NOT CHANGED

## 2020-02-06 ASSESSMENT — PAIN SCALES - GENERAL
PAINLEVEL_OUTOF10: 2

## 2020-02-06 ASSESSMENT — PAIN DESCRIPTION - ORIENTATION: ORIENTATION: LEFT

## 2020-02-06 ASSESSMENT — ENCOUNTER SYMPTOMS
TROUBLE SWALLOWING: 0
SHORTNESS OF BREATH: 0
BACK PAIN: 0
BLOOD IN STOOL: 0
CHEST TIGHTNESS: 0
DIARRHEA: 0
CONSTIPATION: 0
EYE PAIN: 0
NAUSEA: 0
ALLERGIC/IMMUNOLOGIC NEGATIVE: 1
SORE THROAT: 0

## 2020-02-06 ASSESSMENT — PAIN DESCRIPTION - LOCATION
LOCATION: KNEE
LOCATION: KNEE

## 2020-02-06 ASSESSMENT — PAIN DESCRIPTION - PAIN TYPE
TYPE: SURGICAL PAIN
TYPE: SURGICAL PAIN

## 2020-02-06 ASSESSMENT — PAIN DESCRIPTION - DESCRIPTORS: DESCRIPTORS: ACHING

## 2020-02-06 ASSESSMENT — PAIN DESCRIPTION - ONSET: ONSET: ON-GOING

## 2020-02-06 ASSESSMENT — PAIN DESCRIPTION - FREQUENCY: FREQUENCY: INTERMITTENT

## 2020-02-06 ASSESSMENT — PAIN - FUNCTIONAL ASSESSMENT: PAIN_FUNCTIONAL_ASSESSMENT: ACTIVITIES ARE NOT PREVENTED

## 2020-02-06 NOTE — PLAN OF CARE
Care plan reviewed with patient and. Patient   Problem: Falls - Risk of:  Goal: Will remain free from falls  Description  Will remain free from falls  Outcome: Ongoing  Goal: Absence of physical injury  Description  Absence of physical injury  Outcome: Ongoing  Note:   Patient verbalizes understanding of fall precautions, uses call light appropriately. Problem: Bleeding:  Goal: Will show no signs and symptoms of excessive bleeding  Description  Will show no signs and symptoms of excessive bleeding  Outcome: Ongoing  Note:   No signs or symptoms of excessive bleeding noted. Problem: Mobility - Impaired:  Goal: Mobility will improve  Description  Mobility will improve  Outcome: Ongoing  Note:   Mobility improving daily. Problem: Infection - Surgical Site:  Goal: Will show no infection signs and symptoms  Description  Will show no infection signs and symptoms  Outcome: Ongoing  Note:   No signs or symptoms of infection noted. Problem: Breathing Pattern - Ineffective:  Goal: Ability to achieve and maintain a regular respiratory rate will improve  Description  Ability to achieve and maintain a regular respiratory rate will improve  Outcome: Ongoing  Note:   Respiratory rate improving daily. Problem: Pain:  Goal: Pain level will decrease  Description  Pain level will decrease  Outcome: Ongoing  Goal: Control of acute pain  Description  Control of acute pain  Outcome: Ongoing  Note:   Patient  satisfied with pain control. Problem: Nutrition  Goal: Optimal nutrition therapy  Outcome: Ongoing  Note:   Eating  % of most meals. Problem: DISCHARGE BARRIERS  Goal: Patient's continuum of care needs are met  Outcome: Ongoing  Note:   Working toward goal of discharge to home. verbalize understanding of the plan of care and contribute to goal setting.

## 2020-02-06 NOTE — PROGRESS NOTES
[address]  Occupational Therapy  Daily Note  Time:  Time In: 07  Time Out: 0830  Timed Code Treatment Minutes: 60 Minutes  Minutes: 60    Date: 2020  Patient Name: Daphnie Johnson,   Gender: female      Room: [facility]/053-A  MRN: [id number]  : 1955  (75 y.o.)  Referring Practitioner: Dr. Chris Neil  Diagnosis: s/p total Knee Arthroplasty, L   Additional Pertinent Hx: The patient is a 75 y.o. female who had a left TKR done at [facility] 2 days ago (2020) by Dr Narinder Monson. She was recuperating well when she suddenly became hypoxemic, hypotensive and flipped into a-fib with RVR-HR in the 150s today. She was bolused with IVF and transferred to acute bed at [facility] for further care. Dopplers of LE Neg for DVT. VQ scan inconclusive for PE. CTA of chest negative for clot and emboli. Transfer to Rehab 2020    Restrictions/Precautions:  Restrictions/Precautions: General Precautions, Fall Risk, Contact Precautions, Weight Bearing  Left Lower Extremity Weight Bearing: Weight Bearing As Tolerated    SUBJECTIVE: Pt washing up in bathroom with student nurse upon arrival, agreeable to OT. PAIN: 2/10: L knee pain    COGNITION: WNL    ADL:   Upper Extremity Dressing: Stand By Assistance and with set-up. Lower Extremity Dressing: Stand By Assistance and with set-up. Education done on [facility] for donning socks and shoes, pt demo'ed competence. BALANCE:  Sitting Balance:  Modified Independent. Standing Balance: Stand By Assistance. BED MOBILITY:  Not Tested    TRANSFERS:  Sit to Stand:  Stand By Assistance. BSC, standard chair  Stand to Sit: Stand By Assistance. FUNCTIONAL MOBILITY:  Assistive Device: Rolling Walker  Assist Level:  Stand By Assistance. Distance:  To and from bathroom and To and from therapy apartment     ADDITIONAL ACTIVITIES:  -Pt completed dynamic standing IADL activity of washing laundry within the therapy apartment requiring SBA when standing x5 min.   -Pt completed IADL activity of gathering linens around therapy gym with CGA  utilizing LHAE to gather linens from lower surface. After gathering linens, pt pinned linens on clothesline and folded them, requiring SBA for static standing x8 min x2 events with rest breaks due to fatigue. Pt required no vc's for knee precautions. ASSESSMENT:  Activity Tolerance:  Patient tolerance of  treatment: good. Discharge Recommendations: Continue to assess pending progress, Home with assist PRN  Equipment Recommendations: Equipment Needed: No  Other: Pt has LHAE if needed. Pt reports daughter is buying her a shower chair and grab bars to install in shower and near toilet. Plan: Times per week: 5x/wk x90 min and 1x/wk x 30 min  Plan weeks: 3  Current Treatment Recommendations: Endurance Training, Patient/Caregiver Education & Training, Balance Training, Functional Mobility Training, Safety Education & Training, Self-Care / ADL, Home Management Training    Patient Education  Patient Education: ADL's, IADL's, Equipment Education and Assistive Device Safety    Goals  Short term goals  Time Frame for Short term goals: 2 weeks  Short term goal 1: Pt to increase endurance to be able to navigate HH distances requiring in home (approx 25 feet) using AD with SBA, no increase in SOB or need for rest breaks to complete simple homemaking tasks and ADLs  Short term goal 2: Pt to don/doff LB clothing including ALEJANDRA hose using LHAE or compensatory tech with CGA to increase indep with self care. Short term goal 3: Pt to increase standing endurance to 3 min with SBA, and no UE support to complete simple meal prep. Short term goal 4: Pt to complete toilet hygiene with adaptive strategies and SBA to increase indep with wiping bottom during toileting routine.   Long term goals  Time Frame for Long term goals : 3 weeks  Long term goal 1: Pt will complete light meal prep with Hannah to restore PLOF required for pt to

## 2020-02-06 NOTE — PROGRESS NOTES
Physical Medicine & Rehabilitation  Progress Note    Chief Complaint:  s/p LEFT total knee arthroplasty/HAP    Subjective:   Pain is 2/10 in her left knee and she states that her pain is well controlled. She did ask if the supplemental oxygen could be discontinued that is connected to her CPAP as she does not use this at home. Her preferred pharmacy for her impending discharge was confirmed today. Patient states that she feels really good today and has no signs at this time. Rehabilitation:   Physical Therapy:  OBJECTIVE:     Transfers:  Sit to Stand: Modified Independent  Stand to Sit:Supervision into recliner due to higher height, Mod. I into standard height chairs. Car:Supervision, patient completed car transfer with folding and placing RW into backseat of car, then used side of car to walk up to front seat and open door and get into car. Extra time to left LE into car but no assist or cues needed. When getting out of car, walked along side and then opened backseat door and removed walker and un-folded it and placed in front of her without any difficulty.      Ambulation:  Modified Independent   Distance: 80 ft. X1, 50 ft. X1; 75 ft. X1; 150 ft x1   Surface: Level Tile and Carpet  Device:Rolling Walker  Gait Deviations: Forward Flexed Posture, Decreased Step Length on Left, Decreased Heel Strike Bilaterally, Decreased Terminal Knee Extension on LLE during stance time and decreased knee flexion on LLE during swing phase.      Stairs:  Stairs:  6\" steps. X 8 using both hands on left ascending rail and Modified Independent. Platform:  6\" platform X 1 using Rolling Walker and Modified Independent.      Balance:  Dynamic Standing Balance: Modified Independent, with intermittent support on RW while completing bathroom activities     Functional Outcome Measures: Not completed     ASSESSMENT:  Assessment: Patient progressing toward established goals.   Activity Tolerance:  Patient tolerance of  treatment: good.      Equipment Recommendations:Equipment Needed: Yes(RW ordered from [facility] 2/4/20)  Discharge Recommendations: Home with assist PRN, OP PT     Occupational Therapy:  COGNITION: WNL     ADL:   No ADL's completed this session. .     BALANCE:  Sitting Balance:  Modified Independent. Standing Balance: Stand By Assistance.       BED MOBILITY:  Not Tested     TRANSFERS:  Sit to Stand:  Stand By Assistance. recliner  Stand to Sit: Stand By Assistance.       FUNCTIONAL MOBILITY:  Assistive Device: Rolling Walker  Assist Level:  Stand By Assistance. Distance: Within room      ADDITIONAL ACTIVITIES:  -Pt completed B UE exercises in all joints and all planes with 2# weight x10 reps x1 set. Activity completed to increase strength and endurance of B UE to increase independence with functional transfers.   -Pt completed IADL activity of folding laundry with SBA for standing balance x8 min. No vc's needed for twisting at the knee during activity.      ASSESSMENT:  Activity Tolerance:  Patient tolerance of  treatment: good. Discharge Recommendations: Continue to assess pending progress, Home with assist PRN  Equipment Recommendations: Equipment Needed: No  Other: Pt has LHAE if needed. Pt reports daughter is buying her a shower chair and grab bars to install in shower and near toilet. Plan: Times per week: 5x/wk x90 min and 1x/wk x 30 min  Plan weeks: 3  Current Treatment Recommendations: Endurance Training, Patient/Caregiver Education & Training, Balance Training, Functional Mobility Training, Safety Education & Training, Self-Care / ADL, Home Management Training    Review of Systems:  Review of Systems   Constitutional: Positive for activity change. Negative for appetite change, chills, fatigue and fever. HENT: Negative for nosebleeds, sore throat and trouble swallowing. Eyes: Negative for pain. Respiratory: Negative for chest tightness and shortness of breath.     Gastrointestinal: Negative for blood in stool, constipation, diarrhea and nausea. Endocrine: Negative for cold intolerance. Genitourinary: Negative for dysuria, frequency and urgency. Musculoskeletal: Positive for arthralgias and gait problem. Negative for back pain. Skin: Negative for pallor. Allergic/Immunologic: Negative. Neurological: Positive for weakness. Negative for tremors, numbness and headaches. Hematological: Negative. Psychiatric/Behavioral: Negative for confusion, decreased concentration and dysphoric mood. The patient is not nervous/anxious. All other systems reviewed and are negative. Objective:  BP (!) 120/58   Pulse 74   Temp 98.7 °F (37.1 °C) (Oral)   Resp 18   Ht 4' 11\" (1.499 m)   Wt 206 lb 6.4 oz (93.6 kg)   SpO2 92%   BMI 41.69 kg/m²   CURRENT VITALS:  height is 4' 11\" (1.499 m) and weight is 206 lb 6.4 oz (93.6 kg). Her oral temperature is 98.7 °F (37.1 °C). Her blood pressure is 120/58 (abnormal) and her pulse is 74. Her respiration is 18 and oxygen saturation is 92%. Body mass index is 41.69 kg/m².   Temperature Range (24h):Temp: 98.7 °F (37.1 °C) Temp  Av.4 °F (36.9 °C)  Min: 98 °F (36.7 °C)  Max: 98.7 °F (37.1 °C)  BP Range (58I): Systolic (55NPH), ATR:923 , Min:115 , UCL:632     Diastolic (92GEK), MANJIT:90, Min:58, Max:60    Pulse Range (24h): Pulse  Av.3  Min: 74  Max: 88  Respiration Range (24h): Resp  Av  Min: 16  Max: 18  Current Pulse Ox (24h):  SpO2: 92 %  Pulse Ox Range (24h):  SpO2  Av.7 %  Min: 92 %  Max: 95 %  Oxygen Amount and Delivery: O2 Flow Rate (L/min): 1 L/min    awake  Orientation:   person, place, time, situation.     Mood: within normal limits  Affect: calm  General appearance:  in no acute distress, up in chair    Memory:   grossly normal  Attention/Concentration: normal  Language:  normal     ROM:  abnormal - LEFT TKA  Motor Exam:  Motor exam is 5 out of 5 all extremities with the exception of LEFT leg not tested     Tone:  Normal, LEFT leg not Date: 1/31/2020  PROCEDURE: US RENAL LIMITED CLINICAL INFORMATION: Hydronephrosis TECHNIQUE: Ultrasound of the kidneys and urinary bladder was performed. Grayscale and color images were obtained. COMPARISON: Renal ultrasound 1/27/2020 FINDINGS: The right kidney measures 10.1 x 5.6 x 5.3 cm and left kidney measures 9.7 x 5.2 x 5.2 cm. There is mild cortical thinning on the right side. Mild left-sided hydronephrosis is not significantly changed. No renal calcifications or masses are seen. Color Doppler demonstrates expected wave forms in the bilateral renal arteries. The arcuate resistive indices are mildly elevated at 0.8 bilaterally. The urinary bladder is unremarkable. There is no postvoid residual urine in the bladder. 1. Mild right-sided renal cortical thinning. 2. Mild left-sided hydronephrosis. 3. Mildly elevated bilateral arcuate resistive indices of uncertain clinical significance but possibly related to medical renal disease. 4. Unremarkable urinary bladder. Final report electronically signed by Dr. Kimberly Culp on 1/31/2020 2:14 PM    Impression:  1. Status post total knee arthroplasty by Dr. Tony Barbour on 1/24/2020.  2. Gait instability. 3. Acute hypoxemic respiratory failure. 4. Healthcare associated pneumonia. 5. Antibiotic associated diarrhea. 6. Atrial fibrillation with rapid ventricular response. 7. Acute kidney injury. 8. Excessive use of non steroidal anti inflammatory medications. 9. Leukocytosis. 10. Hypomagnesemia. 11. Hypertension. 12. Hyperlipidemia. 13. Hypothyroidism. 14. Incidental finding of complex left renal lesion. 15. Obstructive sleep apnea with use of CPAP. 16. History of seizures with the last occurring over 20 years ago. 17. History of vitamin D deficiency. 18. Morbid obesity; BMI of 44.4. 19. Hypovitaminosis D.   20. Reactive thrombocytosis.     Plan:     Medical management: Per primary team and Dr. Mery Fernando.     Consultants: Internal Medicine, Nephrology, Cardiology, Orthopedic Surgery, Gastroenterology, Physical Medicine     Narcotic usage: Norco last 5 day usage None. Stable. Last BM:  Stool Amount: Medium (02/05/20 0906)    FUNCTIONAL OUTCOMES TOOLS:    HANSEN -      Tinetti -      TUG - Timed Up and Go: 18    Acute/Rehabilitation Problems:  1. Status post total knee arthroplasty by Dr. Saba Chau on 1/24/2020.  1. Continue using CPM as directed by orthopedic surgeon. 2. Wound care as directed. 3. Pain control. 1. Norco.  Discontinued on 2/7 due to non-usage over the last 6 days. 2. Tylenol. 3. Consider Lidoderm patches if additional basal pain control is needed. Defer for now. 4. Continue with CPM.  Patient currently at 70 degrees on 2/5.  2. Gait instability. 1. PT/OT. 2. TUG 13 seconds on 2/1 and slight decrease to 18 seconds on 2/4.  60-69 years:  8.1 seconds; 70-79 years: 9.2 seconds; 80-99 years: 11.3 seconds. 3. Acute hypoxemic respiratory failure. 1. Chest x-ray on 2/1 which showed persistent diminished lung volumes with bibasilar atelectasis and/or infiltrate left greater than right. 2. Chest x-ray on 2/5 without any noted intrathoracic process. 3. Order written for incentive spirometer every 4 hours while awake. 4. DuoNeb nebulizer. 5. Started cefepime for presumptive healthcare associated pneumonia. Patient allergic to penicillins. 4. Antibiotic associated diarrhea. 1. Imodium. 2. Culturelle twice daily with meals ordered on 2/3.  5. Atrial fibrillation with rapid ventricular response. 1. Cardizem CD. 2. Toprol-XL. 6. Acute kidney injury/Excessive use of non steroidal anti inflammatory medications. 1. Nephrology following. 2. Cr/BUN/GFR on 2/1 was 0.9/22/62 improved over prior labs. Further improvement to 0.7/29/83 on 2/3.  3. Avoid NSAIDs. 4. PhosLo. 1. Consulted with nephrology today with agreement to discontinue this medication and follow-up with nephrology after discharge.   Medication discontinued on 2/4.  7. Leukocytosis. 1. WBCs 22.0 on 2/1 compared to 14.9 on 1/30. Abnormal.    Decreased to 19.2 on 2/3 with left shift of 15.9. Further decreased to 18.2 on 2/4. WBCs further improved to 17.7 on 2/5 in the setting of missing 2 doses of cefepime secondary to losing IV access. 2. Midline placed on 2/5. Plan on continuing patient on 3 days of Omnicef after discharge. 3. Pro calcitonin 0.78 on 2/3.  4. Low-grade temp of 99 today, with source possibly being the noted infiltrate on the chest x-ray from 2/1.  8. Nutrition:  Consultation to dietician for nutritional counseling and recommendations. 1. TotP 6.4, alb 2.5, Vitamin 25OH level of 23 on 2/1/2020.  2. Cholecalciferol 5000 IU daily. 9. Electrolytes. 1. Normal on 2/1 with exception of:  2. Hypomagnesemia of 1.5 on 2/1. Magnesium improved to 2.1 on 2/3. Stable at 2.1 on 2/5. Consider discontinuing magnesium. 3. Daily 400 mg of magnesium oxide ordered on 2/1. Starting on 2/5 will dose magnesium every other day. 10. Bladder: No issues  11. Bowel: Senna, colace, MOM  12. Rehabilitation nursing will be involved for bowel, bladder, skin, and pain management. Nursing will also provide education and training to patient and family. 13. Prophylaxis:  DVT: Eliquis. GI: None. 14.  and case management consultations for coordination of care and discharge planning. Chronic Problems:  1. Hypertension. 1. Lasix 20 mg twice daily. 1. 20 mEq of potassium twice daily. 2. Cardizem CD. 3. Toprol-XL. 2. Hyperlipidemia. 1. Simvastatin. 3. Hypothyroidism. 1. Levothyroxine. 4. Incidental finding of complex left renal lesion. 1. Nephrology will follow up with the patient in the office for further work-up. 5. Obstructive sleep apnea with use of CPAP. 1. Fortunately  6. History of seizures with the last occurring over 20 years ago. 1. Keppra. 7. History of vitamin D deficiency.   1. Vitamin D 25-hydroxy was 23 on 2/1.  8. Morbid obesity; BMI of 44. 4.     Labs reviewed on:  1. 1/31.  2. 2/1.  3. 2/3.  4. 2/4.  5. 2/5.     Infectious Disease:  1. Cefepime. 2. Contact isolation for VRE. Missed Therapy Time:  None     DME:    Discharge Plan:  Estimated Discharge Date: 2/9   Destination: outpatient therapies  Services at Discharge: Outpatient Physical Therapy 3x week  Is patient appropriate for an outpatient driving evaluation? no  Equipment at Discharge: CPM, RW, BSC    Greater than 50% of 20 minutes was spent with the patient discussing her current labs and medications planned for her discharge on Sunday.     Hetal Holguin MD

## 2020-02-06 NOTE — PROGRESS NOTES
Education & Training, Home Exercise Program, Equipment Evaluation, Education, & procurement, ADL/Self-care Training, Stair training, Balance Training    Patient Education  Patient Education: Plan of Care, Home Exercise Program, Precautions/Restrictions, [employer] Mobility, Transfers, Reviewed Prior Education, Gait, Stairs,  - Patient Verbalized Understanding, - Patient Requires Continued Education    Goals:  Patient goals : go home  Short term goals  Time Frame for Short term goals: 1 week  Short term goal 1: Pt to be S for supine <> sit to get in/out of bed  Short term goal 2: Pt to be S for sit <> stand to get up to ambulate  Short term goal 3: Pt to ambulate >100 ft with RW with Supervision for household distances  Short term goal 4: Pt to negotiate 2+ steps with L ascending rail at [facility], curb step at [facility] w/ RW for home access  Short term goal 5: Pt time on TUG will improve to <=17 seconds with use of RW to decrease risk of falls  Short term goal 6: Pt AROM of L knee will improve to range from 0-95 degrees to allow for improved gait mechanics  Long term goals  Time Frame for Long term goals : 2 weeks  Long term goal 1: Pt to be mod I for supine <> sit to get in/out of bed  Long term goal 2: Pt to be mod I for sit <> stand to get up to ambulate  Long term goal 3: Pt to ambulate >=150 ft with RW at mod I for household distances  Long term goal 4: Pt to negotiate 2+ steps with L ascending rail at mod I, curb step at Mod I w/ RW for home access  Long term goal 5: Pt to perform car transfer at mod I for transport needs  Long term goal 6: Pt time on TUG will improve to <=14 seconds with use of RW to decrease risk of falls    Following session, patient left in safe position with all fall risk precautions in place.

## 2020-02-06 NOTE — PROGRESS NOTES
INTERNAL MEDICINE Progress Note  2/6/2020 5:59 PM  Subjective:   Admit Date: 1/31/2020  PCP: Katt Estrada, DO  Interval History:   No fever,  No SOB,   no chest pain,   no cough,     Objective:   Vitals: BP (!) 120/58   Pulse 74   Temp 98.7 °F (37.1 °C) (Oral)   Resp 18   Ht 4' 11\" (1.499 m)   Wt 206 lb 6.4 oz (93.6 kg)   SpO2 92%   BMI 41.69 kg/m²   General appearance: alert and cooperative with exam  HEENT:  atraumatic  Neck:  no JVD  Lungs: CTA boris  Heart: S1, S2 normal  Abdomen: soft, non-tender; bowel sounds normal; no masses,  no organomegaly  Extremities: healing left knee  Neurologic:  Alert, oriented, thought content appropriate      Medications:   Scheduled Meds:   lidocaine 1 % injection  5 mL Intradermal Once    sodium chloride flush  10 mL Intravenous 2 times per day    lactobacillus  1 capsule Oral BID WC    magnesium oxide  400 mg Oral Every Other Day    cefepime  2 g Intravenous Q8H    Vitamin D  5,000 Units Oral Daily    apixaban  5 mg Oral BID    diltiazem  120 mg Oral Daily    furosemide  20 mg Oral BID    levETIRAcetam  1,000 mg Oral BID    levothyroxine  25 mcg Oral Daily    metoprolol succinate  50 mg Oral Daily    potassium chloride  20 mEq Oral BID WC    simvastatin  80 mg Oral Nightly     Continuous Infusions:      Lab Results:   CBC:   Recent Labs     02/04/20  0549 02/05/20  0708 02/06/20  1200   WBC 18.2* 17.7* 15.2*   HGB 10.2* 9.8* 9.3*   * 616* 679*     BMP:    No results for input(s): NA, K, CL, CO2, BUN, CREATININE, GLUCOSE in the last 72 hours. TSH:    Lab Results   Component Value Date    TSH 1.140 01/27/2020     TTE   Technically difficult study.   Ejection fraction was estimated at 55-60%.   There was trace tricuspid regurgitation.   Assuming RAP = 5 mmHg, the estimated RVSP = 49 mmHg.   Ascending aorta = 2.6 cm.  IVC not welll seen.     Renal USS  Minimal hydronephrosis bilaterally. There is also cortical thinning bilaterally.  Nonspecific complex focus left kidney    VL DUP LOWER EXTREMITY VENOUS BILATERAL  No evidence of deep venous thrombosis in either lower extremity. NM LUNG VENT/PERFUSION (VQ)  1. Indeterminate probability for pulmonary embolism. Consider further evaluation with chest CTA. CTA chest:  1. No evidence of pulmonary embolus. 2. Bibasilar atelectasis, right greater than left. 3. Surgical changes from prior left partial pneumonectomy. 4. 6 mm nodule in the residual left lung. Consider follow-up CT in one year. Assessment and Plan:   1. Pneumonia- HAP  2. Acute hypoxemic resp failure, improved  3. A fib, on BB, cardizem, NOAC  4. S/p left total knee replacement  5. HTN  6. Hypothyroidism  7. TONEY on nocte CPAP  8. Seizure(h/o)     Cont IV antibiotic  cont NOAC eliquis.  CCB, BB  Bronchodilators,   Am labs    Idris Vilchis MD

## 2020-02-06 NOTE — PLAN OF CARE
Problem: Nutrition  Goal: Optimal nutrition therapy  2/6/2020 1432 by Brian Ramírez, RD, LD  Outcome: Ongoing   Nutrition Problem: Inadequate oral intake  Intervention: Food and/or Nutrient Delivery: Continue current diet, Discontinue ONS, Vitamin Supplement(Recommend a Multivitamin w/minerals daily.  Discontinued Ensure High Protein BID per pt request.)  Nutritional Goals: Pt will consume 75% or more of meals during LOS

## 2020-02-06 NOTE — PROGRESS NOTES
RECREATIONAL THERAPY  MISSED TREATMENT    []Transitional Care Unit  [x]Inpatient Rehabilitation    Date:  2/6/2020            Patient Name: Chang Palma           MRN: [id number]  Sabas: [de-identified]          YOB: 1955 (66 y.o.)       Gender: female   Diagnosis: s/p total Knee Arthroplasty, L   Physician: Referring Practitioner: Dr. Kirsten Knutson TREATMENT:    []Hold treatment per nursing request  []Patient refusal  []Family declined treatment  []Patient at testing and/or off the floor  []Patient unavailable, with PT/OT/nursing, etc.  [x]Other declined playing Ogine with peers today due to fatigue   Santino Cruz, [address] 2/6/2020

## 2020-02-06 NOTE — PROGRESS NOTES
[address]  Occupational Therapy  Daily Note  Time:   Time In: 1200  Time Out: 1230  Timed Code Treatment Minutes: 30 Minutes  Minutes: 30     Date: 2020  Patient Name: Josh Street,   Gender: female      Room: [facility]/3-A  MRN: [id number]  : 1955  (75 y.o.)  Referring Practitioner: Dr. Candis Saab  Diagnosis: s/p total Knee Arthroplasty, L   Additional Pertinent Hx: The patient is a 75 y.o. female who had a left TKR done at [facility] 2 days ago (2020) by Dr Saira Rowland. She was recuperating well when she suddenly became hypoxemic, hypotensive and flipped into a-fib with RVR-HR in the 150s today. She was bolused with IVF and transferred to acute bed at [facility] for further care. Dopplers of LE Neg for DVT. VQ scan inconclusive for PE. CTA of chest negative for clot and emboli. Transfer to Rehab 2020    Restrictions/Precautions:  Restrictions/Precautions: General Precautions, Fall Risk, Contact Precautions, Weight Bearing  Left Lower Extremity Weight Bearing: Weight Bearing As Tolerated    SUBJECTIVE: Pt seated in recliner upon arrival, agreeable to OT. PAIN: No c/o    COGNITION: WNL    ADL:   No ADL's completed this session. Marie Whelanem BALANCE:  Sitting Balance:  Modified Independent. Standing Balance: Stand By Assistance. BED MOBILITY:  Not Tested    TRANSFERS:  Sit to Stand:  Stand By Assistance. recliner  Stand to Sit: Stand By Assistance. FUNCTIONAL MOBILITY:  Assistive Device: Rolling Walker  Assist Level:  Stand By Assistance. Distance: Within room     ADDITIONAL ACTIVITIES:  -Pt completed B UE exercises in all joints and all planes with 2# weight x10 reps x1 set. Activity completed to increase strength and endurance of B UE to increase independence with functional transfers.   -Pt completed IADL activity of folding laundry with SBA for standing balance x8 min. No vc's needed for twisting at the knee during activity.

## 2020-02-07 LAB
ANION GAP SERPL CALCULATED.3IONS-SCNC: 10 MEQ/L (ref 8–16)
BUN BLDV-MCNC: 23 MG/DL (ref 7–22)
CALCIUM SERPL-MCNC: 8.9 MG/DL (ref 8.5–10.5)
CHLORIDE BLD-SCNC: 102 MEQ/L (ref 98–111)
CO2: 21 MEQ/L (ref 23–33)
CREAT SERPL-MCNC: 0.9 MG/DL (ref 0.4–1.2)
ERYTHROCYTE [DISTWIDTH] IN BLOOD BY AUTOMATED COUNT: 14.3 % (ref 11.5–14.5)
ERYTHROCYTE [DISTWIDTH] IN BLOOD BY AUTOMATED COUNT: 46.8 FL (ref 35–45)
GFR SERPL CREATININE-BSD FRML MDRD: 62 ML/MIN/1.73M2
GLUCOSE BLD-MCNC: 110 MG/DL (ref 70–108)
HCT VFR BLD CALC: 31.6 % (ref 37–47)
HEMOGLOBIN: 9.7 GM/DL (ref 12–16)
MAGNESIUM: 2.3 MG/DL (ref 1.6–2.4)
MCH RBC QN AUTO: 28 PG (ref 26–33)
MCHC RBC AUTO-ENTMCNC: 30.7 GM/DL (ref 32.2–35.5)
MCV RBC AUTO: 91.1 FL (ref 81–99)
PLATELET # BLD: 809 THOU/MM3 (ref 130–400)
PMV BLD AUTO: 9.5 FL (ref 9.4–12.4)
POTASSIUM SERPL-SCNC: 4.2 MEQ/L (ref 3.5–5.2)
POTASSIUM SERPL-SCNC: 5.9 MEQ/L (ref 3.5–5.2)
RBC # BLD: 3.47 MILL/MM3 (ref 4.2–5.4)
SODIUM BLD-SCNC: 133 MEQ/L (ref 135–145)
VITAMIN D 25-HYDROXY: 26 NG/ML (ref 30–100)
WBC # BLD: 14.7 THOU/MM3 (ref 4.8–10.8)

## 2020-02-07 PROCEDURE — 6360000002 HC RX W HCPCS: Performed by: PHYSICAL MEDICINE & REHABILITATION

## 2020-02-07 PROCEDURE — 97110 THERAPEUTIC EXERCISES: CPT

## 2020-02-07 PROCEDURE — 1180000000 HC REHAB R&B

## 2020-02-07 PROCEDURE — 36415 COLL VENOUS BLD VENIPUNCTURE: CPT

## 2020-02-07 PROCEDURE — 6370000000 HC RX 637 (ALT 250 FOR IP): Performed by: INTERNAL MEDICINE

## 2020-02-07 PROCEDURE — 97530 THERAPEUTIC ACTIVITIES: CPT

## 2020-02-07 PROCEDURE — 6370000000 HC RX 637 (ALT 250 FOR IP): Performed by: PHYSICAL MEDICINE & REHABILITATION

## 2020-02-07 PROCEDURE — 97116 GAIT TRAINING THERAPY: CPT

## 2020-02-07 PROCEDURE — 97535 SELF CARE MNGMENT TRAINING: CPT

## 2020-02-07 PROCEDURE — 85027 COMPLETE CBC AUTOMATED: CPT

## 2020-02-07 PROCEDURE — 80048 BASIC METABOLIC PNL TOTAL CA: CPT

## 2020-02-07 PROCEDURE — 82306 VITAMIN D 25 HYDROXY: CPT

## 2020-02-07 PROCEDURE — 2580000003 HC RX 258: Performed by: PHYSICAL MEDICINE & REHABILITATION

## 2020-02-07 PROCEDURE — 83735 ASSAY OF MAGNESIUM: CPT

## 2020-02-07 RX ADMIN — LEVETIRACETAM 1000 MG: 500 TABLET, FILM COATED ORAL at 10:12

## 2020-02-07 RX ADMIN — CEFEPIME 2 G: 2 INJECTION, POWDER, FOR SOLUTION INTRAMUSCULAR; INTRAVENOUS at 11:57

## 2020-02-07 RX ADMIN — APIXABAN 5 MG: 5 TABLET, FILM COATED ORAL at 10:10

## 2020-02-07 RX ADMIN — Medication 1 CAPSULE: at 10:12

## 2020-02-07 RX ADMIN — ACETAMINOPHEN 650 MG: 325 TABLET ORAL at 17:20

## 2020-02-07 RX ADMIN — ACETAMINOPHEN 650 MG: 325 TABLET ORAL at 07:07

## 2020-02-07 RX ADMIN — SIMVASTATIN 80 MG: 40 TABLET, FILM COATED ORAL at 22:45

## 2020-02-07 RX ADMIN — CEFEPIME 2 G: 2 INJECTION, POWDER, FOR SOLUTION INTRAMUSCULAR; INTRAVENOUS at 22:41

## 2020-02-07 RX ADMIN — Medication 10 ML: at 22:45

## 2020-02-07 RX ADMIN — CEFEPIME 2 G: 2 INJECTION, POWDER, FOR SOLUTION INTRAMUSCULAR; INTRAVENOUS at 04:30

## 2020-02-07 RX ADMIN — Medication 10 ML: at 22:44

## 2020-02-07 RX ADMIN — LEVETIRACETAM 1000 MG: 500 TABLET, FILM COATED ORAL at 22:45

## 2020-02-07 RX ADMIN — DILTIAZEM HYDROCHLORIDE 120 MG: 120 CAPSULE, COATED, EXTENDED RELEASE ORAL at 10:11

## 2020-02-07 RX ADMIN — VITAMIN D, TAB 1000IU (100/BT) 5000 UNITS: 25 TAB at 10:13

## 2020-02-07 RX ADMIN — POTASSIUM CHLORIDE 20 MEQ: 1500 TABLET, EXTENDED RELEASE ORAL at 10:14

## 2020-02-07 RX ADMIN — POTASSIUM CHLORIDE 20 MEQ: 1500 TABLET, EXTENDED RELEASE ORAL at 17:20

## 2020-02-07 RX ADMIN — APIXABAN 5 MG: 5 TABLET, FILM COATED ORAL at 22:45

## 2020-02-07 RX ADMIN — SODIUM CHLORIDE, PRESERVATIVE FREE 10 ML: 5 INJECTION INTRAVENOUS at 22:46

## 2020-02-07 RX ADMIN — HYDROCODONE BITARTRATE AND ACETAMINOPHEN 1 TABLET: 5; 325 TABLET ORAL at 22:46

## 2020-02-07 RX ADMIN — LEVOTHYROXINE SODIUM 25 MCG: 25 TABLET ORAL at 05:11

## 2020-02-07 RX ADMIN — FUROSEMIDE 20 MG: 20 TABLET ORAL at 11:56

## 2020-02-07 RX ADMIN — FUROSEMIDE 20 MG: 20 TABLET ORAL at 05:11

## 2020-02-07 RX ADMIN — METOPROLOL SUCCINATE 50 MG: 50 TABLET, EXTENDED RELEASE ORAL at 10:13

## 2020-02-07 RX ADMIN — Medication 1 CAPSULE: at 17:21

## 2020-02-07 ASSESSMENT — PAIN SCALES - GENERAL
PAINLEVEL_OUTOF10: 2

## 2020-02-07 ASSESSMENT — ENCOUNTER SYMPTOMS
BACK PAIN: 0
TROUBLE SWALLOWING: 0
BLOOD IN STOOL: 0
DIARRHEA: 0
ALLERGIC/IMMUNOLOGIC NEGATIVE: 1
EYE PAIN: 0
CONSTIPATION: 0
SHORTNESS OF BREATH: 0
CHEST TIGHTNESS: 0
NAUSEA: 0
SORE THROAT: 0

## 2020-02-07 NOTE — PROGRESS NOTES
therapy. PAIN: None Indicated    OBJECTIVE:  Bed Mobility:  Rolling to Left: Modified Independent   Rolling to Right: Modified Independent   Supine to Sit: Modified Independent  Sit to Supine: Modified Independent     Transfers:  Sit to Stand: Modified Independent  Stand to Sit:Modified Independent    Ambulation:  Modified Independent  Distance: 150'x2  Surface: Level Tile  Device:Rolling Walker  Gait Deviations: Forward Flexed Posture, Slow Rayna, Decreased Step Length on Left, Decreased Weight Shift Left, Decreased Gait Speed and Decreased Heel Strike on Left    Stairs:  Stairs:  6\" steps. X 4 using One Handrail and Modified Independent. Exercise:  Patient rode [employer] bike 5', L1 to work on knee ROM and strength for L LE  Placed CPM machine on pt. At end of session and attended for 3 cycles to ensure comfort and adequate ROM. ROM set to 0-74 degrees with education on increasing as L knee loosens up. Completed for increased ROM for improved functional mobility. Functional Outcome Measures: Not completed       ASSESSMENT:  Assessment: Patient progressing toward established goals. Activity Tolerance:  Patient tolerance of  treatment: good.       Equipment Recommendations:Equipment Needed: Yes(RW ordered from [facility] 2/4/20)  Discharge Recommendations:  Continue to assess pending progress    Plan: Times per week: 5x/wk 90min, 1x/wk 30min  Current Treatment Recommendations: Strengthening, Gait Training, Patient/Caregiver Education & Training, ROM, Functional Mobility Training, Endurance Training, Transfer Training, Safety Education & Training, Home Exercise Program, Equipment Evaluation, Education, & procurement, ADL/Self-care Training, Stair training, Balance Training    Patient Education  Patient Education: Plan of Care, [employer] Mobility, Transfers, Gait, Stairs, Verbal Exercise Instruction    Goals:  Patient goals : go home  Short term goals  Time Frame for Short term goals: 1 week  Short term goal 1: Pt to be

## 2020-02-07 NOTE — PROGRESS NOTES
17.5  Min: 17  Max: 18  Current Pulse Ox (24h):  SpO2: 93 %  Pulse Ox Range (24h):  SpO2  Av %  Min: 92 %  Max: 97 %  Oxygen Amount and Delivery: O2 Flow Rate (L/min): 1 L/min    awake  Orientation:   person, place, time, situation.     Mood: within normal limits  Affect: calm  General appearance:  in no acute distress, up in hallway walking with daughter at her side using a 2 wheeled walker with nice symmetric gait pattern    Memory:   grossly normal  Attention/Concentration: normal  Language:  normal     ROM:  abnormal - LEFT TKA  Motor Exam:  Motor exam is 5 out of 5 all extremities with the exception of LEFT leg not tested     Tone:  Normal, LEFT leg not testable  Muscle bulk: within normal limits  Sensory:  Sensory intact  Coordination:   normal  Deep Tendon Reflexes:  Reflexes are intact and symmetrical bilaterally, with LEFT patella and achilles MSR not testted     Skin: warm and dry, no rash or erythema  Peripheral vascular: Pulses: Normal upper and lower extremity pulses; Edema: 1+    Diagnostics:   Recent Results (from the past 24 hour(s))   CBC    Collection Time: 20 12:00 PM   Result Value Ref Range    WBC 15.2 (H) 4.8 - 10.8 thou/mm3    RBC 3.26 (L) 4.20 - 5.40 mill/mm3    Hemoglobin 9.3 (L) 12.0 - 16.0 gm/dl    Hematocrit 30.2 (L) 37.0 - 47.0 %    MCV 92.6 81.0 - 99.0 fL    MCH 28.5 26.0 - 33.0 pg    MCHC 30.8 (L) 32.2 - 35.5 gm/dl    RDW-CV 14.0 11.5 - 14.5 %    RDW-SD 47.8 (H) 35.0 - 45.0 fL    Platelets 170 (H) 479 - 400 thou/mm3    MPV 9.3 (L) 9.4 - 12.4 fL   CBC    Collection Time: 20  6:45 AM   Result Value Ref Range    WBC 14.7 (H) 4.8 - 10.8 thou/mm3    RBC 3.47 (L) 4.20 - 5.40 mill/mm3    Hemoglobin 9.7 (L) 12.0 - 16.0 gm/dl    Hematocrit 31.6 (L) 37.0 - 47.0 %    MCV 91.1 81.0 - 99.0 fL    MCH 28.0 26.0 - 33.0 pg    MCHC 30.7 (L) 32.2 - 35.5 gm/dl    RDW-CV 14.3 11.5 - 14.5 %    RDW-SD 46.8 (H) 35.0 - 45.0 fL    Platelets 515 (H) 957 - 400 thou/mm3    MPV 9.5 9.4 - 12.4 fL Detail Level: Detailed Basic Metabolic Panel    Collection Time: 02/07/20  6:45 AM   Result Value Ref Range    Sodium 133 (L) 135 - 145 meq/L    Potassium 5.9 (H) 3.5 - 5.2 meq/L    Chloride 102 98 - 111 meq/L    CO2 21 (L) 23 - 33 meq/L    Glucose 110 (H) 70 - 108 mg/dL    BUN 23 (H) 7 - 22 mg/dL    CREATININE 0.9 0.4 - 1.2 mg/dL    Calcium 8.9 8.5 - 10.5 mg/dL   Magnesium    Collection Time: 02/07/20  6:45 AM   Result Value Ref Range    Magnesium 2.3 1.6 - 2.4 mg/dL   Anion Gap    Collection Time: 02/07/20  6:45 AM   Result Value Ref Range    Anion Gap 10.0 8.0 - 16.0 meq/L   Glomerular Filtration Rate, Estimated    Collection Time: 02/07/20  6:45 AM   Result Value Ref Range    Est, Glom Filt Rate 62 (A) ml/min/1.73m2   Potassium    Collection Time: 02/07/20  9:30 AM   Result Value Ref Range    Potassium 4.2 3.5 - 5.2 meq/L     Labs Renal Latest Ref Rng & Units 2/7/2020 2/7/2020 2/3/2020 2/1/2020 1/31/2020   BUN 7 - 22 mg/dL - 23(H) 29(H) 22 25(H)   Cr 0.4 - 1.2 mg/dL - 0.9 0.7 0.9 1.0   K 3.5 - 5.2 meq/L 4.2 5. 9(H) 4.7 4.0 4.0   Na 135 - 145 meq/L - 133(L) 139 135 138      Recent Labs     02/07/20  0645 02/06/20  1200 02/05/20  0708   WBC 14.7* 15.2* 17.7*   HGB 9.7* 9.3* 9.8*   HCT 31.6* 30.2* 31.1*   MCV 91.1 92.6 91.7   * 679* 616*      Xr Chest Standard (2 Vw)    Result Date: 2/5/2020  PROCEDURE: XR CHEST (2 VW) CLINICAL INFORMATION: Interval image for prior infiltrates. COMPARISON: Chest x-ray 1/30/2020. TECHNIQUE: PA and lateral views of the chest were obtained. FINDINGS: The lungs are clear. The cardiac silhouette and pulmonary vasculature are within normal limits. There is no significant pleural effusion or pneumothorax. Visualized portions of the upper abdomen are within normal limits. The osseous structures are intact. No acute fractures or suspicious osseous lesions. There is no acute intrathoracic process. **This report has been created using voice recognition software.  It may contain minor errors which are inherent in voice recognition technology. ** Final report electronically signed by Dr Jaimee Smith on 2/5/2020 1:01 PM    Xr Chest Standard (2 Vw)    Result Date: 1/31/2020  PROCEDURE: XR CHEST (2 VW) CLINICAL INFORMATION: hypoxia with resp distress. COMPARISON: December 26, 2020 TECHNIQUE: PA and lateral views the chest. FINDINGS: The heart remains stable. There are persistent diminished lung volumes. Minimal infrahilar atelectasis changes appear similar to prior study. Underlying infiltrate particularly at the left lower lobe is not excluded. There is no visualized effusion or pneumothorax. The exam is negative for congestive failure changes. There is no acute or suspicious pathology of the skeleton. Mild degenerative changes are noted. 1. Persistent diminished lung volumes with bibasilar atelectasis and/or infiltrate left greater than right. **This report has been created using voice recognition software. It may contain minor errors which are inherent in voice recognition technology. ** Final report electronically signed by Dr. Will Damian on 1/31/2020 10:31 PM    Xr Abdomen (kub) (single Ap View)    Result Date: 1/31/2020  PROCEDURE: XR ABDOMEN (KUB) (SINGLE AP VIEW) CLINICAL INFORMATION: RIGHT sided abdominal pain. COMPARISON: No prior study. TECHNIQUE: A supine AP view of the abdomen was obtained. FINDINGS: There is a nonobstructive gas pattern visualized. There is no free air present. Post surgical changes of cholecystectomy and left hip arthroplasty are visualized. There are degenerative changes of the skeleton. 1. No evidence of acute bowel obstruction 2. Postsurgical changes noted. 3. Degenerative skeletal changes. **This report has been created using voice recognition software. It may contain minor errors which are inherent in voice recognition technology. ** Final report electronically signed by Dr. Will Damian on 1/31/2020 10:27 PM    Us Renal Limited    Result Date: Orthopedic Surgery, Gastroenterology, Physical Medicine     Narcotic usage: Norco last 5 day usage None. Stable. Last BM:  Stool Amount: Medium (02/05/20 0906)    FUNCTIONAL OUTCOMES TOOLS:    HANSEN -      Tinetti -      TUG - Timed Up and Go: 18    Acute/Rehabilitation Problems:  1. Status post total knee arthroplasty by Dr. Tony Barbour on 1/24/2020.  1. Continue using CPM as directed by orthopedic surgeon. 2. Wound care as directed. 3. Pain control. 1. Norco.  Discontinued on 2/7 due to non-usage over the last 6 days. 2. Tylenol. 3. Consider Lidoderm patches if additional basal pain control is needed. Defer for now as pain levels have been well controlled with simply Tylenol  4. Continue with CPM.  Patient currently at 70 degrees on 2/5.  2. Gait instability. 1. PT/OT. 2. TUG 13 seconds on 2/1 and slight decrease to 18 seconds on 2/4.  60-69 years:  8.1 seconds; 70-79 years: 9.2 seconds; 80-99 years: 11.3 seconds. 3. Acute hypoxemic respiratory failure. 1. Chest x-ray on 2/1 which showed persistent diminished lung volumes with bibasilar atelectasis and/or infiltrate left greater than right. 2. Chest x-ray on 2/5 without any noted intrathoracic process. 3. Order written for incentive spirometer every 4 hours while awake. 4. DuoNeb nebulizer. Discontinued on 2/8.  5. Started cefepime for presumptive healthcare associated pneumonia. Patient allergic to penicillins. 6. Lasix 20 mg twice daily. 1. 20 mEq of potassium twice daily. 2. Discontinued on 2/7 as patient has had no further issues with oxygenation. 4. Antibiotic associated diarrhea. 1. Imodium. 2. Culturelle twice daily with meals ordered on 2/3.  5. Atrial fibrillation with rapid ventricular response. 1. Cardizem CD. 2. Toprol-XL. 6. Acute kidney injury/Excessive use of non steroidal anti inflammatory medications. 1. Nephrology following. 2. Cr/BUN/GFR on 2/1 was 0.9/22/62 improved over prior labs.   Further improvement to 0.7/29/83 on 2/3.  On 2/7 mild decline to 0.9/23/62. 3. Avoid NSAIDs. 4. PhosLo. 1. Consulted with nephrology today with agreement to discontinue this medication and follow-up with nephrology after discharge. Medication discontinued on 2/4.  7. Leukocytosis. 1. WBCs 22.0 on 2/1 compared to 14.9 on 1/30. Abnormal.    Decreased to 19.2 on 2/3 with left shift of 15.9. Further decreased to 18.2 on 2/4.  14.7 on 2/7. WBCs further improved to 17.7 on 2/5 in the setting of missing 2 doses of cefepime secondary to losing IV access. 2. Midline placed on 2/5. Plan on continuing patient on 3 days of Omnicef after discharge. 3. Pro calcitonin 0.78 on 2/3.  4. Low-grade temp of 99 today, with source possibly being the noted infiltrate on the chest x-ray from 2/1.  8. Nutrition:  Consultation to dietician for nutritional counseling and recommendations. 1. TotP 6.4, alb 2.5, Vitamin 25OH level of 23 on 2/1/2020.  2. Cholecalciferol 5000 IU daily. 9. Electrolytes. 1. Normal on 2/1 with exception of:  2. Hypomagnesemia of 1.5 on 2/1. Magnesium improved to 2.1 on 2/3. Stable at 2.1 on 2/5. Magnesium 2.3 on 2/7. Magnesium discontinued. 10. Daily 400 mg of magnesium oxide ordered on 2/1. Starting on 2/5 will dose magnesium every other day. 11. Bladder: No issues  12. Bowel: Senna, colace, MOM  13. Rehabilitation nursing will be involved for bowel, bladder, skin, and pain management. Nursing will also provide education and training to patient and family. 14. Prophylaxis:  DVT: Eliquis. GI: None. 15.  and case management consultations for coordination of care and discharge planning. Chronic Problems:  2. Hypertension. 1. Cardizem CD. 2. Toprol-XL. 3. Hyperlipidemia. 1. Simvastatin. 4. Hypothyroidism. 1. Levothyroxine. 5. Incidental finding of complex left renal lesion. 1. Nephrology will follow up with the patient in the office for further work-up. 6. Obstructive sleep apnea with use of CPAP. 1. Fortunately  7. History of seizures with the last occurring over 20 years ago. 1. Keppra. 8. History of vitamin D deficiency. 1. Vitamin D 25-hydroxy was 23 on 2/1.  9. Morbid obesity; BMI of 44. 4.     Labs reviewed on:  1. 1/31.  2. 2/1.  3. 2/3.  4. 2/4.  5. 2/5.  6. 2/7.     Infectious Disease:  1. Cefepime. 2. Contact isolation for VRE. Missed Therapy Time:  None     DME:    Discharge Plan:  Estimated Discharge Date: 2/9   Destination: outpatient therapies  Services at Discharge: Outpatient Physical Therapy 3x week  Is patient appropriate for an outpatient driving evaluation? no  Equipment at Discharge: CPM, RW, BSC    Greater than 50% of 20 minutes was spent with the patient discussing her progress and changes made to her medication regimen in preparation for going home.   Benita Beckham MD

## 2020-02-07 NOTE — PROGRESS NOTES
[address] [address]  INPATIENT PHYSICAL THERAPY  DAILY NOTE  [facility] - 7E-53/053-A    Time In: 0830  Time Out: 0930  Timed Code Treatment Minutes: 60 Minutes  Minutes: 60          Date: 2020  Patient Name: Shane Love,  Gender:  female        MRN: [id number]  : 1955  (75 y.o.)     Referring Practitioner: KIM Galicia MD  Diagnosis: S/P total knee arthroplasty, left  Additional Pertinent Hx: Per admitting MD note on 20, patient is a 75 y.o. female who had a left TKR done at [facility] 2 days ago. She was recuperating well when she suddenly became hypoxemic, hypotensive and flipped into a-fib with RVR-HR in the 150s today. She was bolused with IVF and transferred to acute bed at [facility] for further care. To [facility] on      Prior Level of Function:  Lives With: Alone  Type of Home: House  Home Layout: One level  Home Access: Stairs to enter with rails  Entrance Stairs - Number of Steps: 2+1  Entrance Stairs - Rails: Left  Home Equipment: [place] Penta, Reacher, Sock aid, Long-handled shoehorn, Standard walker   Bathroom Shower/Tub: Tub/Shower unit  Bathroom Toilet: Standard  Bathroom Equipment: 3-in-1 commode    Receives Help From: Family  ADL Assistance: Independent  Homemaking Assistance: Independent  Homemaking Responsibilities: Yes  Ambulation Assistance: Independent  Transfer Assistance: Independent  Active : Yes  IADL Comments: 3 children live nearby and able to check in on patient and assist as needed. Neighbor Renee Trinidad) will be able to help at discharge as well. (Daughter is a BOOTH). Additional Comments: Pt stating she was completely indep at home with ADL tasks and IADL tasks PTA, no AD PTA    Restrictions/Precautions:  Restrictions/Precautions: General Precautions, Fall Risk, Contact Precautions, Weight Bearing  Left Lower Extremity Weight Bearing: Weight Bearing As Tolerated    SUBJECTIVE: Pt in BS chair upon arrival. Pt Left knee incision healing well.  Pt agreeable to therapy. PAIN: 2/10: Left knee    OBJECTIVE:  Bed Mobility:  Rolling to Left: Modified Independent   Rolling to Right: Modified Independent   Supine to Sit: Supervision  Sit to Supine: Supervision     Transfers:  Sit to Stand: Supervision  Stand to [address] [place] [address], with increased time for completion    Ambulation:  Modified Independent, with increased time for completion  Distance: 150'x2, 30'x1, 20'x1, 20'x1 on ramp, 10'1, short distance throughout gym  Surface: Level Tile and Ramp  Device:Rolling Walker  Gait Deviations: Forward Flexed Posture, Decreased Step Length on Left, Decreased Weight Shift Left, Decreased Gait Speed and Decreased Heel Strike on Left  Pt educated on ambulating on uneven surfaces. Stairs:  Stairs:  6\" steps. X 12 using One Handrail and Modified Independent. Platform:  6\" platform X 1 using Rolling Walker and Modified Independent. Pt performed platform twice with the first attempt requiring Contact Guard Assist due to sequencing. Balance:  Pt reached to pick object from floor using reacher with RW support, Modified Independent  Dynamic Standing Balance, CGA. Pt stood on foam pad ~5' in parallel bars while reaching outside FRANDY to play ring toss. Pt Demonstrated with minimal sway. Exercise:  Patient was guided in 1 set(s) 10 reps of exercise to both lower extremities. Glut sets, Long arc quads, Seated hip flexion, Seated heel/toe raises and Seated isometric hip adduction. Exercises were completed for increased independence with functional mobility. Functional Outcome Measures: Not completed       ASSESSMENT:  Assessment: Patient progressing toward established goals. Activity Tolerance:  Patient tolerance of  treatment: good.       Equipment Recommendations:Equipment Needed: Yes(RW ordered from [facility] 2/4/20)  Discharge Recommendations:  Continue to assess pending progress    Plan: Times per week: 5x/wk 90min, 1x/wk 30min  Current Treatment Recommendations: Strengthening, Gait Training, Patient/Caregiver Education & Training, ROM, Functional Mobility Training, Endurance Training, Transfer Training, Safety Education & Training, Home Exercise Program, Equipment Evaluation, Education, & procurement, ADL/Self-care Training, Stair training, Balance Training    Patient Education  Patient Education: Plan of Care, [employer] Mobility, Transfers, Gait, Stairs, Car Transfers, Verbal Exercise Instruction    Goals:  Patient goals : go home  Short term goals  Time Frame for Short term goals: 1 week  Short term goal 1: Pt to be S for supine <> sit to get in/out of bed  Short term goal 2: Pt to be S for sit <> stand to get up to ambulate  Short term goal 3: Pt to ambulate >100 ft with RW with Supervision for household distances  Short term goal 4: Pt to negotiate 2+ steps with L ascending rail at [facility], curb step at [facility] w/ RW for home access  Short term goal 5: Pt time on TUG will improve to <=17 seconds with use of RW to decrease risk of falls  Short term goal 6: Pt AROM of L knee will improve to range from 0-95 degrees to allow for improved gait mechanics  Long term goals  Time Frame for Long term goals : 2 weeks  Long term goal 1: Pt to be mod I for supine <> sit to get in/out of bed  Long term goal 2: Pt to be mod I for sit <> stand to get up to ambulate  Long term goal 3: Pt to ambulate >=150 ft with RW at mod I for household distances  Long term goal 4: Pt to negotiate 2+ steps with L ascending rail at [facility], curb step at [facility] w/ RW for home access  Long term goal 5: Pt to perform car transfer at mod I for transport needs  Long term goal 6: Pt time on TUG will improve to <=14 seconds with use of RW to decrease risk of falls    Following session, patient left in safe position with all fall risk precautions in place.     Myla Mackenzie PTA

## 2020-02-07 NOTE — PROGRESS NOTES
[facility] [address]  Occupational Therapy  Daily Note  Time:   Time In: 1030  Time Out: 1200  Timed Code Treatment Minutes: 90 Minutes  Minutes: 90          Date: 2020  Patient Name: Taylor Schulz,   Gender: female      Room: [facility]/053-A  MRN: [id number]  : 1955  (75 y.o.)  Referring Practitioner: Dr. Radha Houser  Diagnosis: s/p total Knee Arthroplasty, L   Additional Pertinent Hx: The patient is a 75 y.o. female who had a left TKR done at [facility] 2 days ago (2020) by Dr Sam Comer. She was recuperating well when she suddenly became hypoxemic, hypotensive and flipped into a-fib with RVR-HR in the 150s today. She was bolused with IVF and transferred to acute bed at [facility] for further care. Dopplers of LE Neg for DVT. VQ scan inconclusive for PE. CTA of chest negative for clot and emboli. Transfer to Rehab 2020    Restrictions/Precautions:  Restrictions/Precautions: General Precautions, Fall Risk, Contact Precautions, Weight Bearing  Left Lower Extremity Weight Bearing: Weight Bearing As Tolerated    SUBJECTIVE: pt sitting in recliner when arrived. Pt agreeable to OT     PAIN: pt did not state any pain during session     COGNITION: WFL    ADL:   Eating: Independent  Grooming: modified independent   to stand at sink to comb hair and oral care   Bathing: Minimal Assistance, with set-up and with verbal cues . to complete sponge bathing at sink with min a for L foot   Upper Extremity Dressing: Modified Independent. pt able to get item herself from closet and put on in sitting   Lower Extremity Dressing: Stand by  Assistance, with verbal cues  and with increased time for completion. pt require verbal cues for technique of using sock aide and reacher and shoe horn for donning pants, socks and shoes sitting in recliner   Toileting: Modified Independent. able to complete turner and bottom care   Toilet Transfer: Modified Independent. to RTS .     BALANCE:  Sitting

## 2020-02-07 NOTE — PLAN OF CARE
Problem: DISCHARGE BARRIERS  Goal: Patient's continuum of care needs are met  2/7/2020 1438 by REYNA Barrera  Note:   Plan remains for discharge home on Sunday, 02/09/2020, after completion of IV antibiotics. Patient requested outpatient PT through [facility] in [facility].  Outpatient OT arranged accordingly and appointment time placed on AVS.

## 2020-02-08 LAB
BLOOD CULTURE, ROUTINE: NORMAL
BLOOD CULTURE, ROUTINE: NORMAL

## 2020-02-08 PROCEDURE — 6370000000 HC RX 637 (ALT 250 FOR IP): Performed by: INTERNAL MEDICINE

## 2020-02-08 PROCEDURE — 6360000002 HC RX W HCPCS: Performed by: PHYSICAL MEDICINE & REHABILITATION

## 2020-02-08 PROCEDURE — 1180000000 HC REHAB R&B

## 2020-02-08 PROCEDURE — 97110 THERAPEUTIC EXERCISES: CPT

## 2020-02-08 PROCEDURE — 97535 SELF CARE MNGMENT TRAINING: CPT

## 2020-02-08 PROCEDURE — 97116 GAIT TRAINING THERAPY: CPT

## 2020-02-08 PROCEDURE — 6370000000 HC RX 637 (ALT 250 FOR IP): Performed by: PHYSICAL MEDICINE & REHABILITATION

## 2020-02-08 PROCEDURE — 2580000003 HC RX 258: Performed by: PHYSICAL MEDICINE & REHABILITATION

## 2020-02-08 RX ADMIN — DILTIAZEM HYDROCHLORIDE 120 MG: 120 CAPSULE, COATED, EXTENDED RELEASE ORAL at 08:23

## 2020-02-08 RX ADMIN — LEVOTHYROXINE SODIUM 25 MCG: 25 TABLET ORAL at 06:25

## 2020-02-08 RX ADMIN — ACETAMINOPHEN 650 MG: 325 TABLET ORAL at 17:50

## 2020-02-08 RX ADMIN — SIMVASTATIN 80 MG: 40 TABLET, FILM COATED ORAL at 20:20

## 2020-02-08 RX ADMIN — Medication 10 ML: at 20:49

## 2020-02-08 RX ADMIN — SODIUM CHLORIDE, PRESERVATIVE FREE 10 ML: 5 INJECTION INTRAVENOUS at 11:31

## 2020-02-08 RX ADMIN — SODIUM CHLORIDE, PRESERVATIVE FREE 10 ML: 5 INJECTION INTRAVENOUS at 20:21

## 2020-02-08 RX ADMIN — LEVETIRACETAM 1000 MG: 500 TABLET, FILM COATED ORAL at 08:24

## 2020-02-08 RX ADMIN — CEFEPIME 2 G: 2 INJECTION, POWDER, FOR SOLUTION INTRAMUSCULAR; INTRAVENOUS at 20:21

## 2020-02-08 RX ADMIN — METOPROLOL SUCCINATE 50 MG: 50 TABLET, EXTENDED RELEASE ORAL at 08:24

## 2020-02-08 RX ADMIN — APIXABAN 5 MG: 5 TABLET, FILM COATED ORAL at 20:20

## 2020-02-08 RX ADMIN — VITAMIN D, TAB 1000IU (100/BT) 5000 UNITS: 25 TAB at 08:23

## 2020-02-08 RX ADMIN — CEFEPIME 2 G: 2 INJECTION, POWDER, FOR SOLUTION INTRAMUSCULAR; INTRAVENOUS at 11:30

## 2020-02-08 RX ADMIN — LEVETIRACETAM 1000 MG: 500 TABLET, FILM COATED ORAL at 20:20

## 2020-02-08 RX ADMIN — CEFEPIME 2 G: 2 INJECTION, POWDER, FOR SOLUTION INTRAMUSCULAR; INTRAVENOUS at 04:14

## 2020-02-08 RX ADMIN — Medication 1 CAPSULE: at 17:06

## 2020-02-08 RX ADMIN — ACETAMINOPHEN 650 MG: 325 TABLET ORAL at 07:50

## 2020-02-08 RX ADMIN — APIXABAN 5 MG: 5 TABLET, FILM COATED ORAL at 08:23

## 2020-02-08 RX ADMIN — Medication 1 CAPSULE: at 08:24

## 2020-02-08 ASSESSMENT — PAIN DESCRIPTION - PAIN TYPE
TYPE: SURGICAL PAIN

## 2020-02-08 ASSESSMENT — PAIN - FUNCTIONAL ASSESSMENT
PAIN_FUNCTIONAL_ASSESSMENT: ACTIVITIES ARE NOT PREVENTED

## 2020-02-08 ASSESSMENT — PAIN DESCRIPTION - ORIENTATION
ORIENTATION: LEFT

## 2020-02-08 ASSESSMENT — PAIN DESCRIPTION - DESCRIPTORS
DESCRIPTORS: ACHING

## 2020-02-08 ASSESSMENT — PAIN SCALES - GENERAL
PAINLEVEL_OUTOF10: 1
PAINLEVEL_OUTOF10: 1
PAINLEVEL_OUTOF10: 2
PAINLEVEL_OUTOF10: 0
PAINLEVEL_OUTOF10: 1
PAINLEVEL_OUTOF10: 2

## 2020-02-08 ASSESSMENT — PAIN DESCRIPTION - ONSET: ONSET: ON-GOING

## 2020-02-08 ASSESSMENT — PAIN DESCRIPTION - LOCATION
LOCATION: INCISION
LOCATION: KNEE

## 2020-02-08 ASSESSMENT — PAIN DESCRIPTION - FREQUENCY: FREQUENCY: INTERMITTENT

## 2020-02-08 ASSESSMENT — PAIN DESCRIPTION - PROGRESSION: CLINICAL_PROGRESSION: NOT CHANGED

## 2020-02-08 NOTE — PROGRESS NOTES
PORTABLE PATIENT PROFILE  Jam Kimbrough [facility]  [id number]-[id number]-A    MEDICAL DIAGNOSIS/CONDITION:   Patient Active Problem List   Diagnosis    [facility]    Hypertension    Hypothyroidism    Hyperlipemia    Atrial fibrillation with RVR ([facility]    GRANT (acute kidney injury) ([facility]    TONEY on CPAP    S/P total knee arthroplasty, left       INSURANCE INFORMATION:  Payor: MEDICARE /  /  /     ADVANCED DIRECTIVES:   Advance Directives (For Healthcare)  Healthcare Directive: No, patient does not have an advance directive for healthcare treatment  Full Code     EMERGENCY CONTACT:       RISK FACTORS:   Social History     Tobacco Use    Smoking status: Never Smoker    Smokeless tobacco: Never Used   Substance Use Topics    Alcohol use: No       ALLERGIES:  Allergies   Allergen Reactions    Latex Rash    Pcn [Penicillins] Hives       FUNCTIONAL STATUS:                                EQUIPMENT AND DEVICES:  Assistive Devices  Assistive Devices: Walker, Eyeglasses    SWALLOWING:   Difficulty Chewing or Swallowing Food: No    VISION AND HEARING:   Sensory Problems  Visual impairment: Glasses    PHYSICIANS INVOLVED WITH CARE:    Benita Beckham MD  No ref.  provider found  DO Benita Pena MD      Immunization History   Administered Date(s) Administered    Influenza Virus Vaccine 10/26/2019         Portable Patient Profile was provided to patient and/or family on 2/8/2020

## 2020-02-08 NOTE — PROGRESS NOTES
[address]  Occupational Therapy  Daily Note  Time:   Time In: 0730  Time Out: 0800  Timed Code Treatment Minutes: 30 Minutes  Minutes: 30    Date: 2020  Patient Name: Keyla Hardy,   Gender: female      Room: [facility]/053-A  MRN: [id number]  : 1955  (75 y.o.)  Referring Practitioner: Dr. Angelica Rajan  Diagnosis: s/p total Knee Arthroplasty, L   Additional Pertinent Hx: The patient is a 75 y.o. female who had a left TKR done at [facility] 2 days ago (2020) by Dr Tony Barbour. She was recuperating well when she suddenly became hypoxemic, hypotensive and flipped into a-fib with RVR-HR in the 150s today. She was bolused with IVF and transferred to acute bed at [facility] for further care. Dopplers of LE Neg for DVT. VQ scan inconclusive for PE. CTA of chest negative for clot and emboli. Transfer to Rehab 2020    Restrictions/Precautions:  Restrictions/Precautions: General Precautions, Fall Risk, Contact Precautions, Weight Bearing  Left Lower Extremity Weight Bearing: Weight Bearing As Tolerated    SUBJECTIVE: Upon arrival patient was sleeping in bed. Pt was agreeable to OT session. Pt is very pleasant and eager to be going home tomorrow. PAIN: 1/10: Left Knee     COGNITION: WFL    ADL:   Grooming: Modified Independent. To comb hair, and wash/dry face while standing sinkside. Pt was able to retreive all items from shelf and transport to sink. Bathing: Modified Independent. To complete UB sponge bath while standing at sink. Pt was able to set up wash basin and retrieve all soap. Pt politely declined need to complete LB sponge bath. Upper Extremity Dressing: Modified Independent. Pt able to retrieve all items from cabinet and don/doff shirts in standing. Lower Extremity Dressing: Stand By Assistance, with verbal cues  and with increased time for completion. To don pants with reacher this date. Pt declining need to don shoes or change socks this date.  Pt

## 2020-02-08 NOTE — PROGRESS NOTES
therapy. Therapist assisted with taking CPM off of LLE. PAIN: No pain number indicated. OBJECTIVE:  Bed Mobility:  Supine to Sit: Modified Independent  Scooting: Modified Independent    Transfers:  Sit to Stand: Modified Independent  Stand to Sit:Modified Independent  Car:Modified Independent, folding and un-folding RW and placing in  Backset of car and then walking along car to get into front seat. Ambulation:  Modified Independent  Distance: 150 ft. X2   Surface: Level Tile  Device:Rolling Walker  Gait Deviations: Forward Flexed Posture, Slow Rayna, Decreased Weight Shift Left, Decreased Gait Speed and Decreased Heel Strike Bilaterally    Exercise:. Patient rode Nu-step bike x10 minutes, L2 using all 4 extremities to work on increasing ROM to LLE. [place] Exercises were completed for increased independence with functional mobility. Functional Outcome Measures: Not completed       ASSESSMENT:  Assessment: Patient progressing toward established goals. Activity Tolerance:  Patient tolerance of  treatment: good. Equipment Recommendations:Equipment Needed: Yes(RW ordered from E 2/4/20)  Discharge Recommendations: Home with assist PRN, OP PT     Plan: Times per week: 5x/wk 90min, 1x/wk 30min  Current Treatment Recommendations: Strengthening, Gait Training, Patient/Caregiver Education & Training, ROM, Functional Mobility Training, Endurance Training, Transfer Training, Safety Education & Training, Home Exercise Program, Equipment Evaluation, Education, & procurement, ADL/Self-care Training, Stair training, Balance Training    Patient Education  Patient Education: Plan of Care, Precautions/Restrictions, Transfers, Gait, Car Transfers,  - Patient Verbalized Understanding    Goals:  Patient goals : go home  Short term goals  Time Frame for Short term goals: 1 week  Short term goal 1: Pt to be S for supine <> sit to get in/out of bed-GOAL MET, see LTG.    Short term goal 2: Pt to be S for sit <> stand to

## 2020-02-08 NOTE — PROGRESS NOTES
Indeterminate probability for pulmonary embolism. Consider further evaluation with chest CTA. CTA chest:  1. No evidence of pulmonary embolus. 2. Bibasilar atelectasis, right greater than left. 3. Surgical changes from prior left partial pneumonectomy. 4. 6 mm nodule in the residual left lung. Consider follow-up CT in one year. Assessment and Plan:   1. Pneumonia- HAP  2. Acute hypoxemic resp failure, improved  3. A fib, on BB, cardizem, NOAC  4. S/p left total knee replacement  5. HTN  6. Hypothyroidism  7. TONEY on nocte CPAP  8. Seizure(h/o)     Cont IV antibiotic  eliquis.  CCB, BB  Bronchodilators,     Alexey Ventura MD

## 2020-02-09 VITALS
HEART RATE: 87 BPM | TEMPERATURE: 99 F | RESPIRATION RATE: 16 BRPM | WEIGHT: 205.56 LBS | HEIGHT: 59 IN | SYSTOLIC BLOOD PRESSURE: 113 MMHG | DIASTOLIC BLOOD PRESSURE: 56 MMHG | BODY MASS INDEX: 41.44 KG/M2 | OXYGEN SATURATION: 95 %

## 2020-02-09 LAB
ERYTHROCYTE [DISTWIDTH] IN BLOOD BY AUTOMATED COUNT: 14.1 % (ref 11.5–14.5)
ERYTHROCYTE [DISTWIDTH] IN BLOOD BY AUTOMATED COUNT: 46.6 FL (ref 35–45)
HCT VFR BLD CALC: 31.4 % (ref 37–47)
HEMOGLOBIN: 9.5 GM/DL (ref 12–16)
MCH RBC QN AUTO: 27.7 PG (ref 26–33)
MCHC RBC AUTO-ENTMCNC: 30.3 GM/DL (ref 32.2–35.5)
MCV RBC AUTO: 91.5 FL (ref 81–99)
PLATELET # BLD: 798 THOU/MM3 (ref 130–400)
PMV BLD AUTO: 8.9 FL (ref 9.4–12.4)
RBC # BLD: 3.43 MILL/MM3 (ref 4.2–5.4)
WBC # BLD: 14 THOU/MM3 (ref 4.8–10.8)

## 2020-02-09 PROCEDURE — 2580000003 HC RX 258: Performed by: PHYSICAL MEDICINE & REHABILITATION

## 2020-02-09 PROCEDURE — 36415 COLL VENOUS BLD VENIPUNCTURE: CPT

## 2020-02-09 PROCEDURE — 6360000002 HC RX W HCPCS: Performed by: PHYSICAL MEDICINE & REHABILITATION

## 2020-02-09 PROCEDURE — 6370000000 HC RX 637 (ALT 250 FOR IP): Performed by: PHYSICAL MEDICINE & REHABILITATION

## 2020-02-09 PROCEDURE — 85027 COMPLETE CBC AUTOMATED: CPT

## 2020-02-09 PROCEDURE — 6370000000 HC RX 637 (ALT 250 FOR IP): Performed by: INTERNAL MEDICINE

## 2020-02-09 RX ORDER — DILTIAZEM HYDROCHLORIDE 120 MG/1
120 CAPSULE, COATED, EXTENDED RELEASE ORAL DAILY
Qty: 30 CAPSULE | Refills: 3 | Status: SHIPPED | OUTPATIENT
Start: 2020-02-09 | End: 2020-06-24

## 2020-02-09 RX ORDER — CEFDINIR 300 MG/1
300 CAPSULE ORAL 2 TIMES DAILY
Qty: 6 CAPSULE | Refills: 0 | Status: SHIPPED | OUTPATIENT
Start: 2020-02-09 | End: 2020-02-12

## 2020-02-09 RX ORDER — LACTOBACILLUS RHAMNOSUS GG 10B CELL
1 CAPSULE ORAL 2 TIMES DAILY WITH MEALS
Qty: 8 CAPSULE | Refills: 0 | Status: SHIPPED | OUTPATIENT
Start: 2020-02-09 | End: 2020-02-13

## 2020-02-09 RX ADMIN — Medication 1 CAPSULE: at 09:25

## 2020-02-09 RX ADMIN — ACETAMINOPHEN 650 MG: 325 TABLET ORAL at 08:16

## 2020-02-09 RX ADMIN — CEFEPIME 2 G: 2 INJECTION, POWDER, FOR SOLUTION INTRAMUSCULAR; INTRAVENOUS at 11:44

## 2020-02-09 RX ADMIN — CEFEPIME 2 G: 2 INJECTION, POWDER, FOR SOLUTION INTRAMUSCULAR; INTRAVENOUS at 04:06

## 2020-02-09 RX ADMIN — Medication 10 ML: at 04:13

## 2020-02-09 RX ADMIN — LEVOTHYROXINE SODIUM 25 MCG: 25 TABLET ORAL at 06:01

## 2020-02-09 RX ADMIN — DILTIAZEM HYDROCHLORIDE 120 MG: 120 CAPSULE, COATED, EXTENDED RELEASE ORAL at 08:09

## 2020-02-09 RX ADMIN — LEVETIRACETAM 1000 MG: 500 TABLET, FILM COATED ORAL at 08:09

## 2020-02-09 RX ADMIN — SODIUM CHLORIDE, PRESERVATIVE FREE 10 ML: 5 INJECTION INTRAVENOUS at 09:30

## 2020-02-09 RX ADMIN — Medication 10 ML: at 11:49

## 2020-02-09 RX ADMIN — APIXABAN 5 MG: 5 TABLET, FILM COATED ORAL at 08:09

## 2020-02-09 RX ADMIN — VITAMIN D, TAB 1000IU (100/BT) 5000 UNITS: 25 TAB at 08:09

## 2020-02-09 RX ADMIN — LOPERAMIDE HYDROCHLORIDE 2 MG: 2 CAPSULE ORAL at 09:28

## 2020-02-09 RX ADMIN — METOPROLOL SUCCINATE 50 MG: 50 TABLET, EXTENDED RELEASE ORAL at 08:09

## 2020-02-09 ASSESSMENT — PAIN SCALES - GENERAL
PAINLEVEL_OUTOF10: 2
PAINLEVEL_OUTOF10: 0
PAINLEVEL_OUTOF10: 1
PAINLEVEL_OUTOF10: 2

## 2020-02-09 ASSESSMENT — PAIN DESCRIPTION - PROGRESSION
CLINICAL_PROGRESSION: NOT CHANGED

## 2020-02-09 ASSESSMENT — PAIN DESCRIPTION - PAIN TYPE
TYPE: SURGICAL PAIN

## 2020-02-09 ASSESSMENT — PAIN DESCRIPTION - ONSET
ONSET: ON-GOING
ONSET: ON-GOING

## 2020-02-09 ASSESSMENT — PAIN - FUNCTIONAL ASSESSMENT: PAIN_FUNCTIONAL_ASSESSMENT: ACTIVITIES ARE NOT PREVENTED

## 2020-02-09 ASSESSMENT — PAIN DESCRIPTION - FREQUENCY
FREQUENCY: INTERMITTENT
FREQUENCY: INTERMITTENT

## 2020-02-09 ASSESSMENT — PAIN DESCRIPTION - LOCATION
LOCATION: INCISION

## 2020-02-09 ASSESSMENT — PAIN DESCRIPTION - ORIENTATION
ORIENTATION: LEFT

## 2020-02-09 ASSESSMENT — PAIN DESCRIPTION - DESCRIPTORS
DESCRIPTORS: ACHING
DESCRIPTORS: ACHING

## 2020-02-09 NOTE — PROGRESS NOTES
Order received for DML removal .  Patient  in chair. Transparent dressing removed. Skin accessed appears pink,warm and dry. Area cleaned with chloro prep, bio patch applied, sterile gauze placed over bio patch, DBL midline removed with catheter intact, pressure held with sterile gauze for five minutes. New transparent dressing applied. Educated the patient dressing stays on for 24 hours, signs and symptoms of infection and notify physician  if any blood or oozing. Primary RN notified.

## 2020-02-09 NOTE — PLAN OF CARE
Care plan reviewed with patient and   Problem: Pain:  Description  Pain management should include both nonpharmacologic and pharmacologic interventions. Goal: Pain level will decrease  Description  Pain level will decrease  2/9/2020 0139 by Cosmo Price RN  Outcome: Ongoing  Note:   Pain complaints as documented. Agreeable to take PRN pain medications if needed. Pain goal of \"No pain\"      verbalize understanding of the plan of care and contribute to goal setting.

## 2020-02-09 NOTE — PROGRESS NOTES
Reviewed discharge paper work, medications and future Dr. appointments with patient and daughter was present in room at that time. Verbalizes understanding. Patient take via wheelchair to front lobby at 1410.

## 2020-02-12 NOTE — PROGRESS NOTES
[address]  DISCHARGE NOTE    Date: 2020  Patient Name: Daphnie Johnson,   Gender: female      MRN: [id number]  : 1955  (77 y.o.)  Referring Practitioner: Dr. Chris Neil  Diagnosis: s/p total Knee Arthroplasty, L   Additional Pertinent Hx: The patient is a 75 y.o. female who had a left TKR done at [facility] 2 days ago (2020) by Dr Narinder Monson. She was recuperating well when she suddenly became hypoxemic, hypotensive and flipped into a-fib with RVR-HR in the 150s today. She was bolused with IVF and transferred to acute bed at [facility] for further care. Dopplers of LE Neg for DVT. VQ scan inconclusive for PE. CTA of chest negative for clot and emboli. Transfer to Rehab 2020    Restrictions/Precautions:  Restrictions/Precautions: General Precautions, Fall Risk, Contact Precautions, Weight Bearing    Left Lower Extremity Weight Bearing: Weight Bearing As Tolerated                 Past Medical History:   Diagnosis Date    Hyperlipemia 2012    Hyperlipidemia     Hypertension     Hypothyroidism     Morbid obesity with BMI of 45.0-49.9, adult ([address]     TONEY on CPAP 2020    [facility]     Seizures [facility]     last sz 20+years ago    Vitamin D deficiency      Past Surgical History:   Procedure Laterality Date    APPENDECTOMY      CATARACT REMOVAL       SECTION      CHOLECYSTECTOMY      TOTAL HIP ARTHROPLASTY Left 2012    TOTAL KNEE ARTHROPLASTY Left 2020           Subjective  Assessment completed for discharge only. Assessment:  Assessment: Pt, Kitty Law, has made steady gains on IP rehab but is limited due to short length of stay thus far. She currently requires contact guard A for functional transfers, basic mobility and standing during ADL and IADL tasks.  She completes UB ADLs at a set-up A level and requires min A for LB ADLs, but does report comptency with LHAE if needed. Pt completed daily routines without cue's for TKA precautions and reports she feels comfortable with precautions due to a previous TKA on other LE. She lives home alone so she will be required to be MI at discharge but does have intermittent support by daughter if needed. Equipment Recommendations:  Equipment Needed: No  Other: Pt has LHAE if needed. Pt reports daughter is buying her a shower chair and grab bars to install in shower and near toilet. Plan:  Discharge patient to Pt discharged home with family to assist PRN. No further OT services warrented. Goals:  Short term goals  Time Frame for Short term goals: 2 weeks  Short term goal 1: Pt to increase endurance to be able to navigate [facility] distances requiring in home (approx 25 feet) using AD with SBA, no increase in SOB or need for rest breaks to complete simple homemaking tasks and ADLs MET  Short term goal 2: Pt to don/doff LB clothing including ALEJANDRA hose using LHAE or compensatory tech with CGA to increase indep with self care. MET  Short term goal 3: Pt to increase standing endurance to 3 min with SBA, and no UE support to complete simple meal prep. MET  Short term goal 4: Pt to complete toilet hygiene with adaptive strategies and SBA to increase indep with wiping bottom during toileting routine. MET  Long term goals  Time Frame for Long term goals : 3 weeks  Long term goal 1: Pt will complete light meal prep with Hannah to restore PLOF required for pt to return home alone. MET   Long term goal 2: Pt will complete ADL routine including shower t/f with Hannah to increase indep with self care for return home alone.  MET

## 2020-03-12 LAB
BUN BLDV-MCNC: 13 MG/DL
CALCIUM SERPL-MCNC: 9.1 MG/DL
CHLORIDE BLD-SCNC: 103 MMOL/L
CO2: 24 MMOL/L
CREAT SERPL-MCNC: 0.7 MG/DL
GFR CALCULATED: >60
GLUCOSE BLD-MCNC: 97 MG/DL
PHOSPHORUS: 2.9 MG/DL
POTASSIUM SERPL-SCNC: 4.2 MMOL/L
SODIUM BLD-SCNC: 141 MMOL/L

## 2020-05-07 LAB
ALBUMIN SERPL-MCNC: 2 G/DL
ALP BLD-CCNC: 107 U/L
ALT SERPL-CCNC: 62 U/L
ANION GAP SERPL CALCULATED.3IONS-SCNC: 13 MMOL/L
AST SERPL-CCNC: 129 U/L
B-TYPE NATRIURETIC PEPTIDE: 98 PG/ML
BASOPHILS ABSOLUTE: ABNORMAL
BASOPHILS RELATIVE PERCENT: ABNORMAL
BILIRUB SERPL-MCNC: 0.5 MG/DL (ref 0.1–1.4)
BUN BLDV-MCNC: 18 MG/DL
CALCIUM SERPL-MCNC: 9.1 MG/DL
CHLORIDE BLD-SCNC: 99 MMOL/L
CO2: 25 MMOL/L
CREAT SERPL-MCNC: 1.33 MG/DL
EOSINOPHILS ABSOLUTE: ABNORMAL
EOSINOPHILS RELATIVE PERCENT: ABNORMAL
GFR CALCULATED: 40
GLUCOSE BLD-MCNC: 116 MG/DL
HCT VFR BLD CALC: 34.9 % (ref 36–46)
HEMOGLOBIN: 10.5 G/DL (ref 12–16)
LYMPHOCYTES ABSOLUTE: ABNORMAL
LYMPHOCYTES RELATIVE PERCENT: ABNORMAL
MCH RBC QN AUTO: ABNORMAL PG
MCHC RBC AUTO-ENTMCNC: ABNORMAL G/DL
MCV RBC AUTO: ABNORMAL FL
MONOCYTES ABSOLUTE: ABNORMAL
MONOCYTES RELATIVE PERCENT: ABNORMAL
NEUTROPHILS ABSOLUTE: ABNORMAL
NEUTROPHILS RELATIVE PERCENT: ABNORMAL
PLATELET # BLD: 597 K/ΜL
PMV BLD AUTO: ABNORMAL FL
POTASSIUM SERPL-SCNC: 4 MMOL/L
RBC # BLD: 4.24 10^6/ΜL
SODIUM BLD-SCNC: 132 MMOL/L
TOTAL PROTEIN: 7.1
WBC # BLD: 12.1 10^3/ML

## 2020-05-11 ENCOUNTER — VIRTUAL VISIT (OUTPATIENT)
Dept: NEPHROLOGY | Age: 65
End: 2020-05-11
Payer: MEDICARE

## 2020-05-11 PROCEDURE — 99213 OFFICE O/P EST LOW 20 MIN: CPT | Performed by: INTERNAL MEDICINE

## 2020-05-11 NOTE — PROGRESS NOTES
Wt Readings from Last 3 Encounters:   02/07/20 205 lb 9 oz (93.2 kg)   01/31/20 220 lb 4.8 oz (99.9 kg)   09/24/12 190 lb (86.2 kg)     There is no height or weight on file to calculate BMI. PHYSICAL EXAMINATION:  Constitutional and General Appearance: alert and cooperative, appears comfortable, no apparent distress, not diaphoretic, Appears well-developed and well-nourished. Eyes:  no discharge seen from left eye or right eye  Lungs: no use of accessory muscles or labored breathing noted, Respiratory effort observed to be normal  Skin:  No discoloration noted on facial skin  Neuro: no slurred speech, no facial drooping  Psychiatric: Normal mood and affect, Not agitated  Mental status: Alert and awake, Oriented to person/place/time, Able to follow commands      Due to this being a TeleHealth encounter, evaluation of the following organ systems is limited: Vitals/EENT/Resp/CV/GI//MS/Neuro/Skin/Lymph     Medications:  Current Outpatient Medications   Medication Sig Dispense Refill    diltiazem (CARDIZEM CD) 120 MG extended release capsule Take 1 capsule by mouth daily 30 capsule 3    apixaban (ELIQUIS) 5 MG TABS tablet Take 1 tablet by mouth 2 times daily 60 tablet 2    simvastatin (ZOCOR) 80 MG tablet Take 80 mg by mouth nightly      metoprolol succinate (TOPROL XL) 50 MG extended release tablet Take 50 mg by mouth daily      levETIRAcetam (KEPPRA) 1000 MG tablet Take 1,000 mg by mouth 2 times daily      phenytoin (DILANTIN) 100 MG ER capsule Take 300 mg by mouth nightly.  vitamin D (CHOLECALCIFEROL) 1000 UNIT TABS tablet Take 1,000 Units by mouth 3 times daily.  levothyroxine (SYNTHROID) 25 MCG tablet Take 25 mcg by mouth Daily. No current facility-administered medications for this visit.          Laboratory & Diagnostics:  Old labs  US: mild left sided hydronephrosis  Feb 2020: UA: no blood, trace protein  March 2020: K 4.2, Creat 0.7, Ca 9.1, Phos 2.9  May 2020: Creat 1.3

## 2020-06-22 LAB
BUN BLDV-MCNC: 19 MG/DL
CALCIUM SERPL-MCNC: 9.5 MG/DL
CHLORIDE BLD-SCNC: 99 MMOL/L
CO2: 22 MMOL/L
CREAT SERPL-MCNC: 1.1 MG/DL
GFR CALCULATED: 53
GLUCOSE BLD-MCNC: 157 MG/DL
POTASSIUM SERPL-SCNC: 4.4 MMOL/L
SODIUM BLD-SCNC: 136 MMOL/L

## 2020-06-24 ENCOUNTER — OFFICE VISIT (OUTPATIENT)
Dept: NEPHROLOGY | Age: 65
End: 2020-06-24
Payer: MEDICARE

## 2020-06-24 VITALS
HEART RATE: 110 BPM | TEMPERATURE: 98.3 F | SYSTOLIC BLOOD PRESSURE: 106 MMHG | DIASTOLIC BLOOD PRESSURE: 70 MMHG | OXYGEN SATURATION: 94 % | BODY MASS INDEX: 34.23 KG/M2 | WEIGHT: 169.5 LBS

## 2020-06-24 PROCEDURE — 99213 OFFICE O/P EST LOW 20 MIN: CPT | Performed by: INTERNAL MEDICINE

## 2020-06-24 PROCEDURE — 1090F PRES/ABSN URINE INCON ASSESS: CPT | Performed by: INTERNAL MEDICINE

## 2020-06-24 PROCEDURE — G8427 DOCREV CUR MEDS BY ELIG CLIN: HCPCS | Performed by: INTERNAL MEDICINE

## 2020-06-24 PROCEDURE — 1036F TOBACCO NON-USER: CPT | Performed by: INTERNAL MEDICINE

## 2020-06-24 PROCEDURE — 4040F PNEUMOC VAC/ADMIN/RCVD: CPT | Performed by: INTERNAL MEDICINE

## 2020-06-24 PROCEDURE — G8400 PT W/DXA NO RESULTS DOC: HCPCS | Performed by: INTERNAL MEDICINE

## 2020-06-24 PROCEDURE — G8417 CALC BMI ABV UP PARAM F/U: HCPCS | Performed by: INTERNAL MEDICINE

## 2020-06-24 PROCEDURE — 1123F ACP DISCUSS/DSCN MKR DOCD: CPT | Performed by: INTERNAL MEDICINE

## 2020-06-24 PROCEDURE — 3017F COLORECTAL CA SCREEN DOC REV: CPT | Performed by: INTERNAL MEDICINE

## 2020-06-24 RX ORDER — LATANOPROST 50 UG/ML
1 SOLUTION/ DROPS OPHTHALMIC NIGHTLY
COMMUNITY

## 2020-06-24 NOTE — PROGRESS NOTES
Size: Large Adult)   Pulse 110   Temp 98.3 °F (36.8 °C)   Wt 169 lb 8 oz (76.9 kg)   SpO2 94%   BMI 34.23 kg/m²   Wt Readings from Last 3 Encounters:   06/24/20 169 lb 8 oz (76.9 kg)   02/07/20 205 lb 9 oz (93.2 kg)   01/31/20 220 lb 4.8 oz (99.9 kg)     Body mass index is 34.23 kg/m². General Appearance: alert and cooperative with exam, appears comfortable, no distress  Oral: moist oral mucus membranes  Neck: No jugular venous distention  Lungs: Air entry B/L, no crackles or rales, no use of accessory muscles  Heart: S1, S2 heard  GI: soft, non-tender, no guarding  Extremities: trace ankle LE edema     Medications:  Current Outpatient Medications   Medication Sig Dispense Refill    latanoprost (XALATAN) 0.005 % ophthalmic solution 1 drop nightly      apixaban (ELIQUIS) 5 MG TABS tablet Take 1 tablet by mouth 2 times daily 60 tablet 2    metoprolol succinate (TOPROL XL) 50 MG extended release tablet Take 50 mg by mouth daily      levETIRAcetam (KEPPRA) 1000 MG tablet Take 1,000 mg by mouth 2 times daily      vitamin D (CHOLECALCIFEROL) 1000 UNIT TABS tablet Take 1,000 Units by mouth 3 times daily.  levothyroxine (SYNTHROID) 25 MCG tablet Take 25 mcg by mouth Daily. No current facility-administered medications for this visit. Laboratory & Diagnostics:  US: mild left sided hydronephrosis  January 27, 2020: Right kidney 9.6, left kidney 11.4 cm. 3.4 cm complex left kidney lesion present  Feb 2020: UA: no blood, trace protein  March 2020: K 4.2, Creat 0.7, Ca 9.1, Phos 2.9  May 2020: Creat 1.3    June 2020: K 4.4, Creat 1.1     Impression/Plan:   1. GRANT: In the setting of recent hypotension and altered hemodynamics. Resolved. Creatinine has further improved. It is now down to 1.1, overall stable renal function  2. Left hydronephrosis: Nuclear scan was negative for obstruction but upper pole caliectasis was seen.     3. Left Renal lesion: US dated Jan 27, 2020 showed possible

## 2020-07-02 ENCOUNTER — TELEPHONE (OUTPATIENT)
Dept: NEPHROLOGY | Age: 65
End: 2020-07-02

## 2020-07-07 ENCOUNTER — TELEPHONE (OUTPATIENT)
Dept: NEPHROLOGY | Age: 65
End: 2020-07-07

## 2020-07-07 ENCOUNTER — HOSPITAL ENCOUNTER (OUTPATIENT)
Dept: CT IMAGING | Age: 65
Discharge: HOME | End: 2020-07-07
Payer: MEDICARE

## 2020-07-07 PROCEDURE — 3209999900 CT COMPARISON OF OUTSIDE FILMS

## 2020-07-07 NOTE — TELEPHONE ENCOUNTER
Daughter Parviz Armenta called and would like to have her mom referred to Dr. Amaris Hollingsworth? He is in [facility] ([place], [place]) and daughter don't want to drive to [facility] [place]. Is that ok?       [place] can be reached at [phone]

## 2020-07-08 NOTE — TELEPHONE ENCOUNTER
Made an appointment with Dr. Delphine Huddleston office in [facility] on Tues-7/21 at 11: SRajendra [address] was informed-I faxed the information to their office at [phone]

## 2020-07-08 NOTE — TELEPHONE ENCOUNTER
Called and left a message at Dr. Alma Price office at [phone] to schedule a new patient appointment-this is for hydronephrosis and obstruction of the left kidney

## 2020-10-29 ENCOUNTER — HOSPITAL ENCOUNTER (OUTPATIENT)
Age: 65
Discharge: HOME | End: 2020-10-29
Payer: MEDICARE

## 2020-10-29 PROCEDURE — U0003 INFECTIOUS AGENT DETECTION BY NUCLEIC ACID (DNA OR RNA); SEVERE ACUTE RESPIRATORY SYNDROME CORONAVIRUS 2 (SARS-COV-2) (CORONAVIRUS DISEASE [COVID-19]), AMPLIFIED PROBE TECHNIQUE, MAKING USE OF HIGH THROUGHPUT TECHNOLOGIES AS DESCRIBED BY CMS-2020-01-R: HCPCS

## 2020-10-30 LAB
PERFORMING LAB: NORMAL
REPORT: NORMAL
SARS-COV-2: NOT DETECTED

## 2020-12-29 ENCOUNTER — TELEPHONE (OUTPATIENT)
Dept: PULMONOLOGY | Age: 65
End: 2020-12-29

## 2020-12-29 LAB
BUN BLDV-MCNC: 24 MG/DL
CALCIUM SERPL-MCNC: 9.7 MG/DL
CHLORIDE BLD-SCNC: 108 MMOL/L
CO2: 26 MMOL/L
CREAT SERPL-MCNC: 1.21 MG/DL
GFR CALCULATED: 44
GLUCOSE BLD-MCNC: 83 MG/DL
POTASSIUM SERPL-SCNC: 4.4 MMOL/L
SODIUM BLD-SCNC: 144 MMOL/L

## 2020-12-29 NOTE — TELEPHONE ENCOUNTER
Patient's daughter called questioning if the patient still needs to have the CT of the chest done since she has had recent CT scans of the abdomen. Requested recent report of CT scan from Michelle Jiang on 10/04/2020 and images to be pushed to our pacs. Please advise.

## 2020-12-29 NOTE — TELEPHONE ENCOUNTER
She was advised to come for follow-up with [facility] 1[facility] with CT chest to be done with out contrast at least 2days before clinic visit to follow 6 mm nodule in the residual left lung in February 2020. She underwent CT scan of abdomen in June 2020. There is no mentioning of lung nodule on the CT scan of abdomen. See need to have a repeat CT scan of chest without contrast to be done in February 2021 to follow her 6 mm lung nodule. Please schedule the CT scan to be done at least 2 to 3 days before clinic visit at [facility] to compare with the her old CT scan performed in 2020.

## 2020-12-30 NOTE — TELEPHONE ENCOUNTER
Called patient's daughter and reviewed. Order faxed to [place] per patient's request and confirmation scanned to media.

## 2021-01-05 ENCOUNTER — HOSPITAL ENCOUNTER (OUTPATIENT)
Dept: CT IMAGING | Age: 66
Discharge: HOME | End: 2021-01-05

## 2021-01-05 DIAGNOSIS — R91.1 INCIDENTAL LUNG NODULE, > 3MM AND < 8MM: ICD-10-CM

## 2021-01-05 DIAGNOSIS — Z00.6 EXAMINATION FOR NORMAL COMPARISON FOR CLINICAL RESEARCH: ICD-10-CM

## 2021-01-08 ENCOUNTER — OFFICE VISIT (OUTPATIENT)
Dept: NEPHROLOGY | Age: 66
End: 2021-01-08
Payer: MEDICARE

## 2021-01-08 VITALS
DIASTOLIC BLOOD PRESSURE: 62 MMHG | TEMPERATURE: 96.4 F | OXYGEN SATURATION: 98 % | SYSTOLIC BLOOD PRESSURE: 106 MMHG | WEIGHT: 170 LBS | BODY MASS INDEX: 34.34 KG/M2 | HEART RATE: 64 BPM

## 2021-01-08 DIAGNOSIS — N18.31 STAGE 3A CHRONIC KIDNEY DISEASE (HCC): Primary | ICD-10-CM

## 2021-01-08 PROCEDURE — 99213 OFFICE O/P EST LOW 20 MIN: CPT | Performed by: INTERNAL MEDICINE

## 2021-01-08 PROCEDURE — 1123F ACP DISCUSS/DSCN MKR DOCD: CPT | Performed by: INTERNAL MEDICINE

## 2021-01-08 PROCEDURE — 1036F TOBACCO NON-USER: CPT | Performed by: INTERNAL MEDICINE

## 2021-01-08 PROCEDURE — G8417 CALC BMI ABV UP PARAM F/U: HCPCS | Performed by: INTERNAL MEDICINE

## 2021-01-08 PROCEDURE — G8400 PT W/DXA NO RESULTS DOC: HCPCS | Performed by: INTERNAL MEDICINE

## 2021-01-08 PROCEDURE — 4040F PNEUMOC VAC/ADMIN/RCVD: CPT | Performed by: INTERNAL MEDICINE

## 2021-01-08 PROCEDURE — G8427 DOCREV CUR MEDS BY ELIG CLIN: HCPCS | Performed by: INTERNAL MEDICINE

## 2021-01-08 PROCEDURE — 1090F PRES/ABSN URINE INCON ASSESS: CPT | Performed by: INTERNAL MEDICINE

## 2021-01-08 PROCEDURE — 3017F COLORECTAL CA SCREEN DOC REV: CPT | Performed by: INTERNAL MEDICINE

## 2021-01-08 PROCEDURE — G8484 FLU IMMUNIZE NO ADMIN: HCPCS | Performed by: INTERNAL MEDICINE

## 2021-01-08 NOTE — PROGRESS NOTES
[facility] [address]  Dept: [phone]  Loc: [phone]  Office Progress Note  2021 10:53 AM      Pt Name:    Juana Hatch  YOB: 1955  Primary Care Physician:  Toney Middleton DO     Chief Complaint:   Chief Complaint   Patient presents with    Follow-up: CKD        Background Information/Interval History:   76 yo female with HTN, atrial fibrillation, TONEY, hx NSAID (ibuprofen and Mobic), Knee/hip replacement who recently underwent left knee replacement back in 2020 and developed Atrial fibrillation (Dr. Bolivar Denis) with RVR and GRANT. She was managed expectantly. She was noted to have B/L hydronephrosis but nuclear scan was negative. She was also noted to have complex renal lesion incidentally. I referred her to urology. Further evaluation and work-up revealed pelvic mass and underwent resection and colostomy placement. She is now following with [facility]. She is scheduled for colonoscopy and is likely going to be getting reversal of colostomy sometime next month. She is overall feeling ok. Spoke with daughter on the phone. Patient denies any hematuria, dysuria or any problems urinating. Blood pressure today is 106/62. Denies any dizziness or any lightheadedness. She is not a very good historian.   As stated above I discussed with patient daughter on the phone     Past History:  Past Medical History:   Diagnosis Date    Hyperlipemia 2012    Hyperlipidemia     Hypertension     Hypothyroidism     Morbid obesity with BMI of 45.0-49.9, adult ([address]     TONEY on CPAP 2020    PAF (paroxysmal atrial fibrillation) ([facility])     Seizures ([facility] [facility]     last sz 20+years ago    Vitamin D deficiency      Past Surgical History:   Procedure Laterality Date    APPENDECTOMY      CATARACT REMOVAL  2004     [address] HIP ARTHROPLASTY Left 2012    TOTAL KNEE ARTHROPLASTY Left 01/24/2020        VITALS:  /62 (Site: Left Upper Arm, Position: Sitting, Cuff Size: Large Adult)   Pulse 64   Temp 96.4 °F (35.8 °C) (Temporal)   Wt 170 lb (77.1 kg)   SpO2 98%   BMI 34.34 kg/m²   Wt Readings from Last 3 Encounters:   01/08/21 170 lb (77.1 kg)   06/24/20 169 lb 8 oz (76.9 kg)   02/07/20 205 lb 9 oz (93.2 kg)     Body mass index is 34.34 kg/m². General Appearance: alert and cooperative with exam, appears comfortable, no distress  Oral: moist oral mucus membranes  Neck: No jugular venous distention  Lungs: Air entry B/L, no crackles or rales, no use of accessory muscles  Heart: S1, S2 heard  GI: soft, non-tender, no guarding  Extremities: trace ankle LE edema     Medications:  Current Outpatient Medications   Medication Sig Dispense Refill    latanoprost (XALATAN) 0.005 % ophthalmic solution 1 drop nightly      apixaban (ELIQUIS) 5 MG TABS tablet Take 1 tablet by mouth 2 times daily 60 tablet 2    metoprolol succinate (TOPROL XL) 50 MG extended release tablet Take 50 mg by mouth daily      levETIRAcetam (KEPPRA) 1000 MG tablet Take 1,000 mg by mouth 2 times daily      vitamin D (CHOLECALCIFEROL) 1000 UNIT TABS tablet Take 1,000 Units by mouth 3 times daily.  levothyroxine (SYNTHROID) 25 MCG tablet Take 25 mcg by mouth Daily. No current facility-administered medications for this visit. Laboratory & Diagnostics:  US: mild left sided hydronephrosis  January 27, 2020: Right kidney 9.6, left kidney 11.4 cm. 3.4 cm complex left kidney lesion present  Feb 2020: UA: no blood, trace protein  March 2020: K 4.2, Creat 0.7, Ca 9.1, Phos 2.9  May 2020: Creat 1.3    June 2020: K 4.4, Creat 1.1  Dec 2020: Creat 1.21, K 4.4,  mg/g       Impression/Plan:   1. CKD III: creatinine is stable. At 1.21. Lytes are stable. Overall stable renal function. Advised to avoid nonsteroidals. She does have history of previous NSAID use.   She is no longer taking these. Advised to keep up with hydration. Avoid nephrotoxins. 2. HTN: on Toprol. She reports BP at home is usually in 110-120 range. 3. Left Renal lesion and hydronephrosis: will repeat ultrasound. Discussed with daughter  3. Atrial Fibrillation: On Toprol and Eliquis  5. Pelvic abscess status post Angel's resection with colostomy August 2020  6. History of C. difficile colitis  7. History of NSAID  8. Hx Seizure disorder: on keppra, following with neurology at [address] [place] [address] This Encounter   Procedures    US RENAL COMPLETE    Basic Metabolic Panel    Protein / creatinine ratio, urine     Return in about 6 months (around 7/8/2021).       Anisha Salazar MD  [facility]

## 2021-01-11 ENCOUNTER — HOSPITAL ENCOUNTER (OUTPATIENT)
Age: 66
Setting detail: SPECIMEN
Discharge: HOME | End: 2021-01-11
Payer: MEDICARE

## 2021-01-11 PROCEDURE — U0003 INFECTIOUS AGENT DETECTION BY NUCLEIC ACID (DNA OR RNA); SEVERE ACUTE RESPIRATORY SYNDROME CORONAVIRUS 2 (SARS-COV-2) (CORONAVIRUS DISEASE [COVID-19]), AMPLIFIED PROBE TECHNIQUE, MAKING USE OF HIGH THROUGHPUT TECHNOLOGIES AS DESCRIBED BY CMS-2020-01-R: HCPCS

## 2021-01-13 LAB — SARS-COV-2: NOT DETECTED

## 2021-01-31 NOTE — PROGRESS NOTES
[facility] [address]  Pulmonary medicine clinic initial follow up note/she was seen by me during her hospitalization in 2020. Patient requested to have a repeat CT scan of chest in 1 year to follow her 6 mm nodule in the left lung. Patient: Gina Cruz  : 1955      Chief complaint/[place]:     Gina Cruz is a 77 y.o. old female came for follow-up regarding her 6 mm left lung nodule initially noted on a CTA of chest performed on 2020 after having a CT scan of chest without contrast on 2021 at [facility] radiology department. She was admitted and discharged from [facility] in 2020 for respiratory failure. She underwent a CTA of chest as a part of her evaluation for hypoxia. Patient found to have a 6 mm left lung nodule. She underwent chest CT scan on: 2020  The above chest  CT was performed at: [facility]    She underwent colostomy placement. She was diagnosed with benign mass in the abdomen. She underwent surgery by Dr. Arvind Spear MD general surgeon at [facility] in [place]. Prior history of lung nodule:no  She was ever diagnosed with lung cancer:no    On today's questioning:  She denies cough or expectoration. She denies hemoptysis. She denies fever or chills. She denies recent hospitalizations or emergency room visits. She is not using any inhalers. She denies recent decline in functional status. History of pulmonary tuberculosis in the past: no  Recent exposure to any patients with tuberculosis:no  Recent travel to endemic places of tuberculosis: no  Prior PPD status: negative-2020 used to work as a [employer] in the past.    She is currently not using any oxygen supplementation at rest, exercise or during sleep/at night time. No recent hospitalizations or emergency room visits.      She denies any lung cancer in first-degree relative; exposure to asbestos, radon, or uranium. She had last Mammogram performed on: Her last mammogram is negative for malignancy. She had last Colonoscopy performed on: She had several colonoscopies last year and found to be negative for malignancy. 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                    History of tobacco smoking:No  History of recreational or IV drug use in the past:NO     History of exposure to coal mines/coal dust: NO  History of exposure to [employer] dust/welding: NO  History of exposure to quarry/silica/sandblasting: NO  History of exposure to asbestos/working with breaks/ships: NO  History of exposure to farm dust: NO  History of recent travel to long distances: NO  History of exposure to birds, pigeons, or chickens in the past:NO  Pet animals at home:No    History of pulmonary embolism in the past: No            History of DVT in the past:No                        Review of Systems:   General/Constitutional: she lost ~40lbs of weight in the last year with normal appetite. No fever or chills. HENT: Negative. Eyes: Negative. Upper respiratory tract: No nasal stuffiness or post nasal drip. Lower respiratory tract/ lungs: No cough or sputum production. No hemoptysis. Cardiovascular: No palpitations or chest pain. Gastrointestinal: No nausea or vomiting. Colostomy bag in place  Neurological: No focal neurologiacal weakness. Extremities: No edema. Musculoskeletal: No complaints. Genitourinary: No complaints. Hematological: Negative. Psychiatric/Behavioral: Negative. Skin: No itching.     Current Medications:      Past Medical History:   Diagnosis Date    Hyperlipemia 9/24/2012    Hyperlipidemia     Hypertension     Hypothyroidism     Morbid obesity with BMI of 45.0-49.9, adult ([facility] [address]     TONEY on CPAP 1/30/2020    PAF (paroxysmal atrial fibrillation) (HCC)     Seizures ([facility] [address] last sz 20+years ago    Vitamin D deficiency        Past Surgical History:   Procedure Laterality Date    APPENDECTOMY  1978    CATARACT REMOVAL  2004 [address]     CHOLECYSTECTOMY  1978    TOTAL HIP ARTHROPLASTY Left 07/06/2012    TOTAL KNEE ARTHROPLASTY Left 01/24/2020       Allergies   Allergen Reactions    Latex Rash    Pcn [Penicillins] Hives       Current Outpatient Medications   Medication Sig Dispense Refill    ferrous sulfate (IRON 325) 325 (65 Fe) MG tablet Take 325 mg by mouth 2 times daily      flecainide (TAMBOCOR) 50 MG tablet Take 50 mg by mouth 2 times daily      alendronate (FOSAMAX) 35 MG tablet Take 35 mg by mouth every 7 days      latanoprost (XALATAN) 0.005 % ophthalmic solution 1 drop nightly      apixaban (ELIQUIS) 5 MG TABS tablet Take 1 tablet by mouth 2 times daily 60 tablet 2    metoprolol succinate (TOPROL XL) 50 MG extended release tablet Take 50 mg by mouth daily      levETIRAcetam (KEPPRA) 1000 MG tablet Take by mouth 2 times daily 500mg in am, 750mg @ hs      vitamin D (CHOLECALCIFEROL) 1000 UNIT TABS tablet Take 2,000 Units by mouth daily       levothyroxine (SYNTHROID) 25 MCG tablet Take 25 mcg by mouth Daily. No current facility-administered medications for this visit. Family History   Problem Relation Age of Onset    Arthritis Father     Arthritis Mother     Diabetes Brother            Physical Exam     VITALS:  /72 (Site: Right Upper Arm, Position: Sitting, Cuff Size: Medium Adult)   Pulse 56   Temp 97.8 °F (36.6 °C) (Temporal)   Ht 4' 11\" (1.499 m)   Wt 176 lb (79.8 kg)   SpO2 95% Comment: r/a  BMI 35.55 kg/m²   Nursing note and vitals reviewed. Constitutional: Patient appears moderately built and moderately nourished. No distress. Patient is oriented to person, place, and time. HENT:   Head: Normocephalic and atraumatic.    Right Ear: External ear normal.   Left Ear: External ear normal.   Mouth/Throat: Oropharynx is clear and moist.  No oral thrush. Eyes: Conjunctivae are normal. Pupils are equal, round, and reactive to light. No scleral icterus. Neck: Neck supple. No JVD present. No tracheal deviation present. Cardiovascular: Normal rate, regular rhythm, normal heart sounds. No murmur heard. Pulmonary/Chest: Effort normal and breath sounds normal. No stridor. No respiratory distress. No wheezes. No rales. Patient exhibits no tenderness. Abdominal: Soft. Patient exhibits no distension. No tenderness. Musculoskeletal: Normal range of motion. Extremities: Patient exhibits no edema and no tenderness. Lymphadenopathy:  No cervical adenopathy. Neurological: Patient is alert and oriented to person, place, and time. Skin: Skin is warm and dry. Patient is not diaphoretic. Psychiatric: Patient  has a normal mood and affect. Patient behavior is normal.       Diagnostic Data:    Radiological Data:  CXR  1/26/2020 shows cardiomegaly and low lung volumes but is negative for infiltrate or consolidation. Reviewed.       CTA of chest:  Jan 29, 2020   PROCEDURE: CTA CHEST W WO CONTRAST   1. No evidence of pulmonary embolus. 2. Bibasilar atelectasis, right greater than left. 3. Surgical changes from prior left partial pneumonectomy. 4. 6 mm nodule in the residual left lung. Consider follow-up CT in one year.          Venous duplex scan:  Jan 27, 2020   PROCEDURE: VL DUP LOWER EXTREMITY VENOUS BILATERAL   No evidence of deep venous thrombosis in either lower extremity. Pulmonary function tests:  None in epic    CT scan of chest without contrast performed on the fourth 4 January 2021:            Echo echocardiogram performed on 27 January 2020:  1/27/2020   Narrative & Impression      Transthoracic Echocardiography Report (TTE)  Right Ventricle   The right ventricular size was normal with normal systolic function and   wall thickness. Conclusions      Summary   Technically difficult study.    Ejection fraction was estimated at 55-60%. There was trace tricuspid regurgitation. Assuming RAP = 5 mmHg, the estimated RVSP = 49 mmHg. Ascending aorta = 2.6 cm. IVC not welll seen. Signature      ----------------------------------------------------------------   Electronically signed by Mary Lou Magaña MD (Interpreting   physician) on 01/27/2020 at 05:03 PM   ----------------------------------------------------------------      Assessment:  -6 mm left lung nodule found on the CTA chest dated 29 January 2020. It got calcified on the latest CT scan of chest performed without contrast on 4 January 2021  [facility].  Table  -Acute hypoxemic respiratory failure, Resolved. -Obstructive sleep apnea on treatment with a CPAP pressure of 11 cm of water. He is currently following with Dr. Zaira Montana MD at [facility] sleep clinic in [facility] Stable  -Pulmonary hypertension noted on her echocardiogram dated 27 January 2020 most likely due to left heart disease VS obstructive sleep apnea. She is currently following with Dr. Tristan Wilson MD  -Chronic atrial fibrillation with controlled ventricle response. She is currently on treatment with Eliquis. She follows with Dr. Tristan Wilson MD.  -S/P left total knee replacement 1/24.  -Hx of HTN-under control  -Hx of hypothyroidism. Recommendations/Plan:  -She was advised to follow-up with Dr. Zaira Montana MD regarding her obstructive sleep apnea with CPAP therapy and pulmonary issues due to proximity of his clinic to their home.  -She was advised to keep her follow-up with Dr. Tristan Wilson MD- her Cardiologist in [address] [place] [address] for further evaluation of pulmonary hypertension noted on her echocardiogram.  -Schedule patient for follow up with my clinic on PRN/As needed basis for pulmonary/lung related issues including dyspea.  Patient to follow with his family physician closely.   -Kyle Pisano was advised to make early appointment with my clinic if she

## 2021-02-09 ENCOUNTER — OFFICE VISIT (OUTPATIENT)
Dept: PULMONOLOGY | Age: 66
End: 2021-02-09
Payer: OTHER GOVERNMENT

## 2021-02-09 VITALS
BODY MASS INDEX: 35.48 KG/M2 | DIASTOLIC BLOOD PRESSURE: 72 MMHG | TEMPERATURE: 97.8 F | HEIGHT: 59 IN | HEART RATE: 56 BPM | SYSTOLIC BLOOD PRESSURE: 120 MMHG | OXYGEN SATURATION: 95 % | WEIGHT: 176 LBS

## 2021-02-09 DIAGNOSIS — Z99.89 OSA ON CPAP: ICD-10-CM

## 2021-02-09 DIAGNOSIS — G47.33 OSA ON CPAP: ICD-10-CM

## 2021-02-09 DIAGNOSIS — I27.20 PULMONARY HYPERTENSION (HCC): ICD-10-CM

## 2021-02-09 DIAGNOSIS — R93.89 ABNORMAL CT OF THE CHEST: ICD-10-CM

## 2021-02-09 DIAGNOSIS — R91.1 LEFT UPPER LOBE PULMONARY NODULE: Primary | ICD-10-CM

## 2021-02-09 PROCEDURE — G8417 CALC BMI ABV UP PARAM F/U: HCPCS | Performed by: INTERNAL MEDICINE

## 2021-02-09 PROCEDURE — 1123F ACP DISCUSS/DSCN MKR DOCD: CPT | Performed by: INTERNAL MEDICINE

## 2021-02-09 PROCEDURE — 1090F PRES/ABSN URINE INCON ASSESS: CPT | Performed by: INTERNAL MEDICINE

## 2021-02-09 PROCEDURE — G8400 PT W/DXA NO RESULTS DOC: HCPCS | Performed by: INTERNAL MEDICINE

## 2021-02-09 PROCEDURE — 4040F PNEUMOC VAC/ADMIN/RCVD: CPT | Performed by: INTERNAL MEDICINE

## 2021-02-09 PROCEDURE — G8427 DOCREV CUR MEDS BY ELIG CLIN: HCPCS | Performed by: INTERNAL MEDICINE

## 2021-02-09 PROCEDURE — 99215 OFFICE O/P EST HI 40 MIN: CPT | Performed by: INTERNAL MEDICINE

## 2021-02-09 PROCEDURE — 1036F TOBACCO NON-USER: CPT | Performed by: INTERNAL MEDICINE

## 2021-02-09 PROCEDURE — G8484 FLU IMMUNIZE NO ADMIN: HCPCS | Performed by: INTERNAL MEDICINE

## 2021-02-09 PROCEDURE — 3017F COLORECTAL CA SCREEN DOC REV: CPT | Performed by: INTERNAL MEDICINE

## 2021-02-09 RX ORDER — ALENDRONATE SODIUM 35 MG/1
35 TABLET ORAL
COMMUNITY

## 2021-02-09 RX ORDER — FERROUS SULFATE 325(65) MG
325 TABLET ORAL 2 TIMES DAILY
COMMUNITY
End: 2022-07-20

## 2021-02-09 RX ORDER — FLECAINIDE ACETATE 50 MG/1
50 TABLET ORAL 2 TIMES DAILY
COMMUNITY

## 2021-02-09 NOTE — PROGRESS NOTES
Neck Circumference -   15  Mallampati - 4    Lung Nodule Screening     [] Qualifies    [x] Does not qualify   [] Declined    [] Completed

## 2021-02-09 NOTE — PATIENT INSTRUCTIONS
-She was advised to follow-up with Dr. Bandar Mon MD regarding her obstructive sleep apnea with CPAP therapy and pulmonary issues due to proximity of his clinic to their home.  -She was advised to keep her follow-up with Dr. Melissa Hamilton MD- her Cardiologist in [address] [place] [address] for further evaluation of pulmonary hypertension noted on her echocardiogram.  -Schedule patient for follow up with my clinic on PRN/As needed basis for pulmonary/lung related issues including dyspea. Patient to follow with his family physician closely.   -Obed Abebe was advised to make early appointment with my clinic if she develops any constitutional symptoms including loss of weight, poor appetite or hemoptysis. She verbalizes under standing.  -Patient and her daughter were educated about my impression and plan.  They verbalizes understanding

## 2021-03-19 ENCOUNTER — HOSPITAL ENCOUNTER (OUTPATIENT)
Age: 66
Setting detail: SPECIMEN
Discharge: HOME | End: 2021-03-19
Payer: OTHER GOVERNMENT

## 2021-06-25 LAB
BUN BLDV-MCNC: 20 MG/DL
CALCIUM SERPL-MCNC: 9.8 MG/DL
CHLORIDE BLD-SCNC: 112 MMOL/L
CHOLESTEROL, TOTAL: 208 MG/DL
CHOLESTEROL/HDL RATIO: NORMAL
CO2: 19 MMOL/L
CREAT SERPL-MCNC: 1.2 MG/DL
GFR CALCULATED: 45
GLUCOSE BLD-MCNC: 87 MG/DL
HDLC SERPL-MCNC: 67 MG/DL (ref 35–70)
LDL CHOLESTEROL CALCULATED: 120 MG/DL (ref 0–160)
NONHDLC SERPL-MCNC: 3.1 MG/DL
POTASSIUM SERPL-SCNC: 4.6 MMOL/L
SODIUM BLD-SCNC: 142 MMOL/L
TRIGL SERPL-MCNC: 107 MG/DL
TSH SERPL DL<=0.05 MIU/L-ACNC: 0.93 UIU/ML
VLDLC SERPL CALC-MCNC: 21 MG/DL

## 2021-07-06 DIAGNOSIS — N18.31 STAGE 3A CHRONIC KIDNEY DISEASE (HCC): ICD-10-CM

## 2021-07-07 ENCOUNTER — OFFICE VISIT (OUTPATIENT)
Dept: NEPHROLOGY | Age: 66
End: 2021-07-07
Payer: MEDICARE

## 2021-07-07 VITALS
DIASTOLIC BLOOD PRESSURE: 67 MMHG | SYSTOLIC BLOOD PRESSURE: 113 MMHG | TEMPERATURE: 98.1 F | OXYGEN SATURATION: 97 % | WEIGHT: 186 LBS | BODY MASS INDEX: 37.57 KG/M2 | HEART RATE: 68 BPM

## 2021-07-07 DIAGNOSIS — I10 ESSENTIAL HYPERTENSION: ICD-10-CM

## 2021-07-07 DIAGNOSIS — N18.31 STAGE 3A CHRONIC KIDNEY DISEASE (HCC): Primary | ICD-10-CM

## 2021-07-07 PROBLEM — E55.9 VITAMIN D DEFICIENCY: Status: ACTIVE | Noted: 2021-07-07

## 2021-07-07 PROBLEM — K52.9 GASTROENTERITIS: Status: ACTIVE | Noted: 2020-08-29

## 2021-07-07 PROBLEM — K52.9 ACUTE COLITIS: Status: ACTIVE | Noted: 2020-08-29

## 2021-07-07 PROBLEM — M93.90 OSTEOCHONDROPATHY: Status: ACTIVE | Noted: 2021-07-07

## 2021-07-07 PROBLEM — G40.909 EPILEPSY, NOT REFRACTORY (HCC): Status: ACTIVE | Noted: 2020-06-01

## 2021-07-07 PROBLEM — N13.39 OTHER HYDRONEPHROSIS: Status: ACTIVE | Noted: 2020-07-21

## 2021-07-07 PROBLEM — R06.00 DYSPNEA: Status: ACTIVE | Noted: 2021-07-07

## 2021-07-07 PROBLEM — D64.9 ANEMIA: Status: ACTIVE | Noted: 2020-09-03

## 2021-07-07 PROCEDURE — 1036F TOBACCO NON-USER: CPT | Performed by: INTERNAL MEDICINE

## 2021-07-07 PROCEDURE — 1123F ACP DISCUSS/DSCN MKR DOCD: CPT | Performed by: INTERNAL MEDICINE

## 2021-07-07 PROCEDURE — 1090F PRES/ABSN URINE INCON ASSESS: CPT | Performed by: INTERNAL MEDICINE

## 2021-07-07 PROCEDURE — G8427 DOCREV CUR MEDS BY ELIG CLIN: HCPCS | Performed by: INTERNAL MEDICINE

## 2021-07-07 PROCEDURE — G8417 CALC BMI ABV UP PARAM F/U: HCPCS | Performed by: INTERNAL MEDICINE

## 2021-07-07 PROCEDURE — 4040F PNEUMOC VAC/ADMIN/RCVD: CPT | Performed by: INTERNAL MEDICINE

## 2021-07-07 PROCEDURE — 99213 OFFICE O/P EST LOW 20 MIN: CPT | Performed by: INTERNAL MEDICINE

## 2021-07-07 PROCEDURE — 3017F COLORECTAL CA SCREEN DOC REV: CPT | Performed by: INTERNAL MEDICINE

## 2021-07-07 PROCEDURE — G8400 PT W/DXA NO RESULTS DOC: HCPCS | Performed by: INTERNAL MEDICINE

## 2021-07-07 NOTE — PROGRESS NOTES
[address] [facility] [address]  Dept: [phone]  Loc: [phone]  Office Progress Note  2021 12:40 PM      Pt Name:    Bernhard Oppenheim:    1955  Primary Care Physician:  Vicky Torres DO     Chief Complaint:   Chief Complaint   Patient presents with    Follow-up: CKD        Background Information/Interval History:   73 yo female with HTN, atrial fibrillation, TONEY, hx NSAID (ibuprofen and Mobic), Knee/hip replacement who underwent left knee replacement back in 2020 and developed Atrial fibrillation (Dr. Isabela Perry) with RVR and GRANT. She was managed expectantly. She was noted to have B/L hydronephrosis but nuclear scan was negative. She was also noted to have complex renal lesion incidentally. I referred her to urology. Further evaluation and work-up revealed pelvic mass and underwent resection and colostomy placement. She is now following with [facility]. She is here for 6 months follow-up. Scheduled for colostomy reversal in Aug 2021. Feels okay. No chest pain or shortness of breath. Pt is a poor historian. She is here alone. Not here with her daughter.       Past History:  Past Medical History:   Diagnosis Date    Hyperlipemia 2012    Hyperlipidemia     Hypertension     Hypothyroidism     Morbid obesity with BMI of 45.0-49.9, adult ([facility] [address]     TONEY on CPAP 2020    PAF (paroxysmal atrial fibrillation) (HCC)     Seizures ([facility] [address]     last sz 20+years ago    Vitamin D deficiency      Past Surgical History:   Procedure Laterality Date    APPENDECTOMY      CATARACT REMOVAL       SECTION      CHOLECYSTECTOMY      TOTAL HIP ARTHROPLASTY Left 2012    TOTAL KNEE ARTHROPLASTY Left 2020        VITALS:  /67 (Site: Left Upper Arm, Position: Sitting, Cuff Size: Large Adult)   Pulse 68   Temp 98.1 °F (36.7 °C)   Wt 186 lb (84.4 kg)   SpO2 97%   BMI 37.57 kg/m² Wt Readings from Last 3 Encounters:   07/07/21 186 lb (84.4 kg)   02/09/21 176 lb (79.8 kg)   01/08/21 170 lb (77.1 kg)     Body mass index is 37.57 kg/m². General Appearance: alert and cooperative with exam, appears comfortable, no distress  Oral: moist oral mucus membranes  Neck: No jugular venous distention  Lungs: Air entry B/L, no crackles or rales, no use of accessory muscles  Heart: S1, S2 heard  GI: soft, non-tender, no guarding  Extremities: trace ankle LE edema     Medications:  Current Outpatient Medications   Medication Sig Dispense Refill    ferrous sulfate (IRON 325) 325 (65 Fe) MG tablet Take 325 mg by mouth 2 times daily      flecainide (TAMBOCOR) 50 MG tablet Take 50 mg by mouth 2 times daily      alendronate (FOSAMAX) 35 MG tablet Take 35 mg by mouth every 7 days      latanoprost (XALATAN) 0.005 % ophthalmic solution 1 drop nightly      apixaban (ELIQUIS) 5 MG TABS tablet Take 1 tablet by mouth 2 times daily 60 tablet 2    metoprolol succinate (TOPROL XL) 50 MG extended release tablet Take 50 mg by mouth daily      levETIRAcetam (KEPPRA) 1000 MG tablet Take by mouth 2 times daily 500mg in am, 750mg @ hs      vitamin D (CHOLECALCIFEROL) 1000 UNIT TABS tablet Take 2,000 Units by mouth daily       levothyroxine (SYNTHROID) 25 MCG tablet Take 25 mcg by mouth Daily. No current facility-administered medications for this visit. Laboratory & Diagnostics:  US January 2020: mild left sided hydronephrosis  January 27, 2020: Right kidney 9.6, left kidney 11.4 cm. 3.4 cm complex left kidney lesion present  Feb 2020: UA: no blood, trace protein  March 2020: K 4.2, Creat 0.7, Ca 9.1, Phos 2.9  May 2020: Creat 1.3    June 2020: K 4.4, Creat 1.1  Dec 2020: Creat 1.21, K 4.4,  mg/g  June 2021: K 4.6, Creat 1.20, Sodium 142, Ca 9.8, UPCR 200 mg/g  US July 2021 care everywhere: R 9.6 cm, L 9.1 cm, Unremarkable, no hydronephrosis, no focal lesions     Impression/Plan:   1.  CKD III:

## 2021-09-17 NOTE — PROGRESS NOTES
restroom at start of session    PAIN: 2/10: L knee    OBJECTIVE:  Bed Mobility:  Not Tested    Transfers:  Sit to Stand: Stand By Assistance, with increased time for completion  Stand to Sit:Stand By Assistance, with increased time for completion    Ambulation:  Stand By Assistance, [employer], with increased time for completion  Distance: 150ft x2  Surface: Level Tile  Device:Rolling Walker  Gait Deviations: Forward Flexed Posture, Slow Rayna, Decreased Step Length Bilaterally, Decreased Gait Speed and Decreased Heel Strike on Left    Stairs:  Stairs:  6\" steps. X 4 using One Handrail and Contact Guard Assistance, with increased time for completion, cues for non-reciprocal pattern. Exercises:  Performed NuStep on L1 with all extremities x5 minutes to improve strength and to improve L knee ROM    Functional Outcome Measures: Not completed       ASSESSMENT:  Assessment: Patient progressing toward established goals. Activity Tolerance:  Patient tolerance of  treatment: good.       Equipment Recommendations:Equipment Needed: Yes(RW ordered from [facility] 2/4/20)  Discharge Recommendations:  Continue to assess pending progress    Plan: Times per week: 5x/wk 90min, 1x/wk 30min  Current Treatment Recommendations: Strengthening, Gait Training, Patient/Caregiver Education & Training, ROM, Functional Mobility Training, Endurance Training, Transfer Training, Safety Education & Training, Home Exercise Program, Equipment Evaluation, Education, & procurement, ADL/Self-care Training, Stair training, Balance Training    Patient Education  Patient Education: Plan of Care, Transfers, Gait, Stairs, Verbal Exercise Instruction    Goals:  Patient goals : go home  Short term goals  Time Frame for Short term goals: 1 week  Short term goal 1: Pt to be S for supine <> sit to get in/out of bed  Short term goal 2: Pt to be S for sit <> stand to get up to ambulate  Short term goal 3: Pt to ambulate >100 ft with RW with Supervision for household distances  Short term goal 4: Pt to negotiate 2+ steps with L ascending rail at [facility], curb step at [facility] w/ RW for home access  Short term goal 5: Pt time on TUG will improve to <=17 seconds with use of RW to decrease risk of falls  Short term goal 6: Pt AROM of L knee will improve to range from 0-95 degrees to allow for improved gait mechanics  Long term goals  Time Frame for Long term goals : 2 weeks  Long term goal 1: Pt to be mod I for supine <> sit to get in/out of bed  Long term goal 2: Pt to be mod I for sit <> stand to get up to ambulate  Long term goal 3: Pt to ambulate >=150 ft with RW at [facility] I for household distances  Long term goal 4: Pt to negotiate 2+ steps with L ascending rail at [facility] I, curb step at [facility] I w/ RW for home access  Long term goal 5: Pt to perform car transfer at [facility] I for transport needs  Long term goal 6: Pt time on TUG will improve to <=14 seconds with use of RW to decrease risk of falls    Following session, patient left in safe position with all fall risk precautions in place. 17-Sep-2021

## 2022-07-20 ENCOUNTER — OFFICE VISIT (OUTPATIENT)
Dept: NEPHROLOGY | Age: 67
End: 2022-07-20
Payer: MEDICARE

## 2022-07-20 VITALS
SYSTOLIC BLOOD PRESSURE: 128 MMHG | TEMPERATURE: 98.5 F | OXYGEN SATURATION: 93 % | HEART RATE: 86 BPM | WEIGHT: 206 LBS | DIASTOLIC BLOOD PRESSURE: 74 MMHG | BODY MASS INDEX: 41.61 KG/M2

## 2022-07-20 DIAGNOSIS — I12.9 HYPERTENSIVE RENAL DISEASE, STAGE 1 THROUGH STAGE 4 OR UNSPECIFIED CHRONIC KIDNEY DISEASE: ICD-10-CM

## 2022-07-20 DIAGNOSIS — N18.31 CHRONIC KIDNEY DISEASE, STAGE 3A (HCC): Primary | ICD-10-CM

## 2022-07-20 PROCEDURE — 3017F COLORECTAL CA SCREEN DOC REV: CPT | Performed by: INTERNAL MEDICINE

## 2022-07-20 PROCEDURE — 1036F TOBACCO NON-USER: CPT | Performed by: INTERNAL MEDICINE

## 2022-07-20 PROCEDURE — 1090F PRES/ABSN URINE INCON ASSESS: CPT | Performed by: INTERNAL MEDICINE

## 2022-07-20 PROCEDURE — G8400 PT W/DXA NO RESULTS DOC: HCPCS | Performed by: INTERNAL MEDICINE

## 2022-07-20 PROCEDURE — G8427 DOCREV CUR MEDS BY ELIG CLIN: HCPCS | Performed by: INTERNAL MEDICINE

## 2022-07-20 PROCEDURE — 99213 OFFICE O/P EST LOW 20 MIN: CPT | Performed by: INTERNAL MEDICINE

## 2022-07-20 PROCEDURE — G8417 CALC BMI ABV UP PARAM F/U: HCPCS | Performed by: INTERNAL MEDICINE

## 2022-07-20 PROCEDURE — 1123F ACP DISCUSS/DSCN MKR DOCD: CPT | Performed by: INTERNAL MEDICINE

## 2022-07-20 RX ORDER — AMLODIPINE BESYLATE 2.5 MG/1
2.5 TABLET ORAL DAILY
COMMUNITY

## 2022-07-20 RX ORDER — ASPIRIN 81 MG/1
81 TABLET ORAL DAILY
COMMUNITY

## 2022-07-20 RX ORDER — ACETAMINOPHEN 325 MG/1
650 TABLET ORAL EVERY 6 HOURS PRN
COMMUNITY
Start: 2021-08-05

## 2022-07-20 NOTE — PROGRESS NOTES
[address] [facility] [address]  Dept: [phone]  Loc: [phone]  Office Progress Note  7/20/2022 1:49 PM      Pt Name:    Rosales Mcclure  YOB: 1955  Primary Care Physician:  Gweneth Cranker, DO     Chief Complaint:   Chief Complaint   Patient presents with    Follow-up: CKD        Background Information/Interval History:   68 yo female with HTN, atrial fibrillation, TONEY, hx NSAID (ibuprofen and Mobic), Knee/hip replacement who underwent left knee replacement back in Jan 2020 and developed Atrial fibrillation (Dr. Karlee Peter) with RVR and GRANT. She was managed expectantly. She was noted to have B/L hydronephrosis but nuclear scan was negative. She was also noted to have complex renal lesion incidentally. I referred her to urology. Further evaluation and work-up revealed pelvic mass and underwent resection and colostomy placement with subsequent reversal.  She is now following with [facility]. She is here for 12 months follow-up. Pt had colostomy reversal  last year. She says she feels well. No chest pain or shortness of breath. No diarrhea. She has no urinary complaints. No hematuria.       Past History:  Past Medical History:   Diagnosis Date    Hyperlipemia 9/24/2012    Hyperlipidemia     Hypertension     Hypothyroidism     Morbid obesity with BMI of 45.0-49.9, adult ([facility] [address]     TONEY on CPAP 1/30/2020    PAF (paroxysmal atrial fibrillation) ([facility] [address]     Seizures ([facility] [address]     last sz 20+years ago    Vitamin D deficiency      Past Surgical History:   Procedure Laterality Date    APPENDECTOMY  1978    CATARACT REMOVAL  2004    [facility] HIP ARTHROPLASTY Left 07/06/2012    TOTAL KNEE ARTHROPLASTY Left 01/24/2020        VITALS:  /74 (Site: Right Upper Arm, Position: Sitting, Cuff Size: Large Adult)   Pulse 86   Temp 98.5 °F (36.9 °C)   Wt 206 lb (93.4 kg)   SpO2 93%   BMI 41.61 kg/m²   Wt Readings from Last 3 Encounters:   07/20/22 206 lb (93.4 kg)   07/07/21 186 lb (84.4 kg)   02/09/21 176 lb (79.8 kg)     Body mass index is 41.61 kg/m². General Appearance: alert and cooperative with exam, appears comfortable, no distress  Oral: moist oral mucus membranes  Neck: No jugular venous distention  Lungs: Air entry B/L, no crackles or rales, no use of accessory muscles  Heart: S1, S2 heard  GI: soft, non-tender, no guarding  Extremities: trace ankle LE edema     Medications:  Current Outpatient Medications   Medication Sig Dispense Refill    acetaminophen (TYLENOL) 325 MG tablet Take 650 mg by mouth every 6 hours as needed      aspirin 81 MG EC tablet Take 81 mg by mouth in the morning. amLODIPine (NORVASC) 2.5 MG tablet Take 2.5 mg by mouth in the morning. flecainide (TAMBOCOR) 50 MG tablet Take 50 mg by mouth 2 times daily      alendronate (FOSAMAX) 35 MG tablet Take 35 mg by mouth every 7 days      latanoprost (XALATAN) 0.005 % ophthalmic solution 1 drop nightly      levETIRAcetam (KEPPRA) 1000 MG tablet Take by mouth 2 times daily 500mg in am, 750mg @ hs      vitamin D (CHOLECALCIFEROL) 1000 UNIT TABS tablet Take 2,000 Units by mouth daily       levothyroxine (SYNTHROID) 25 MCG tablet Take 25 mcg by mouth Daily. No current facility-administered medications for this visit. Laboratory & Diagnostics:  US January 2020: mild left sided hydronephrosis  January 27, 2020: Right kidney 9.6, left kidney 11.4 cm. 3.4 cm complex left kidney lesion present  Feb 2020: UA: no blood, trace protein  March 2020: K 4.2, Creat 0.7, Ca 9.1, Phos 2.9  May 2020: Creat 1.3    June 2020: K 4.4, Creat 1.1  Dec 2020: Creat 1.21, K 4.4,  mg/g  June 2021: K 4.6, Creat 1.20, Sodium 142, Ca 9.8, UPCR 200 mg/g  US July 2021 care everywhere: R 9.6 cm, L 9.1 cm, Unremarkable, no hydronephrosis, no focal lesions    June 2022: Creat 1.2, K 4.5     Impression/Plan:   1.  CKD III: creatinine

## 2023-07-19 ENCOUNTER — OFFICE VISIT (OUTPATIENT)
Dept: NEPHROLOGY | Age: 68
End: 2023-07-19
Payer: MEDICARE

## 2023-07-19 VITALS
DIASTOLIC BLOOD PRESSURE: 77 MMHG | SYSTOLIC BLOOD PRESSURE: 122 MMHG | BODY MASS INDEX: 42.25 KG/M2 | HEART RATE: 82 BPM | OXYGEN SATURATION: 90 % | WEIGHT: 209.2 LBS

## 2023-07-19 DIAGNOSIS — I12.9 HYPERTENSIVE RENAL DISEASE, STAGE 1 THROUGH STAGE 4 OR UNSPECIFIED CHRONIC KIDNEY DISEASE: ICD-10-CM

## 2023-07-19 DIAGNOSIS — N18.31 CHRONIC KIDNEY DISEASE, STAGE 3A (HCC): Primary | ICD-10-CM

## 2023-07-19 PROCEDURE — G8417 CALC BMI ABV UP PARAM F/U: HCPCS | Performed by: INTERNAL MEDICINE

## 2023-07-19 PROCEDURE — 3074F SYST BP LT 130 MM HG: CPT | Performed by: INTERNAL MEDICINE

## 2023-07-19 PROCEDURE — G8400 PT W/DXA NO RESULTS DOC: HCPCS | Performed by: INTERNAL MEDICINE

## 2023-07-19 PROCEDURE — 1123F ACP DISCUSS/DSCN MKR DOCD: CPT | Performed by: INTERNAL MEDICINE

## 2023-07-19 PROCEDURE — 99213 OFFICE O/P EST LOW 20 MIN: CPT | Performed by: INTERNAL MEDICINE

## 2023-07-19 PROCEDURE — 1036F TOBACCO NON-USER: CPT | Performed by: INTERNAL MEDICINE

## 2023-07-19 PROCEDURE — 3017F COLORECTAL CA SCREEN DOC REV: CPT | Performed by: INTERNAL MEDICINE

## 2023-07-19 PROCEDURE — G8427 DOCREV CUR MEDS BY ELIG CLIN: HCPCS | Performed by: INTERNAL MEDICINE

## 2023-07-19 PROCEDURE — 1090F PRES/ABSN URINE INCON ASSESS: CPT | Performed by: INTERNAL MEDICINE

## 2023-07-19 PROCEDURE — 3078F DIAST BP <80 MM HG: CPT | Performed by: INTERNAL MEDICINE

## 2024-07-16 PROBLEM — M79.673 PAIN OF FOOT: Status: ACTIVE | Noted: 2024-07-16

## 2024-07-16 PROBLEM — M51.379 DEGENERATION OF LUMBOSACRAL INTERVERTEBRAL DISC: Status: ACTIVE | Noted: 2024-07-16

## 2024-07-16 PROBLEM — L85.1 ACQUIRED PLANTAR KERATODERMA: Status: ACTIVE | Noted: 2024-07-16

## 2024-07-16 PROBLEM — G89.18 ACUTE POSTOPERATIVE PAIN: Status: ACTIVE | Noted: 2024-07-16

## 2024-07-16 PROBLEM — U07.1 DISEASE DUE TO SEVERE ACUTE RESPIRATORY SYNDROME CORONAVIRUS 2 (SARS-COV-2): Status: ACTIVE | Noted: 2024-07-16

## 2024-07-16 PROBLEM — L24.5 IRRITANT CONTACT DERMATITIS DUE TO CHEMICAL: Status: ACTIVE | Noted: 2024-07-16

## 2024-07-16 PROBLEM — M17.9 OSTEOARTHRITIS OF KNEE: Status: ACTIVE | Noted: 2024-07-16

## 2024-07-16 PROBLEM — M79.609 PAIN IN EXTREMITY: Status: ACTIVE | Noted: 2024-07-16

## 2024-07-16 PROBLEM — M51.37 DEGENERATION OF LUMBOSACRAL INTERVERTEBRAL DISC: Status: ACTIVE | Noted: 2024-07-16

## 2024-07-16 PROBLEM — K94.03 COLOSTOMY STRICTURE (HCC): Status: ACTIVE | Noted: 2020-10-14

## 2024-07-16 PROBLEM — N95.1 SYMPTOMATIC MENOPAUSAL OR FEMALE CLIMACTERIC STATES: Status: ACTIVE | Noted: 2024-07-16

## 2024-07-16 PROBLEM — M48.061 LUMBAR FORAMINAL STENOSIS: Status: ACTIVE | Noted: 2024-07-16

## 2024-07-16 PROBLEM — S06.0X0A CONCUSSION WITHOUT LOSS OF CONSCIOUSNESS: Status: ACTIVE | Noted: 2024-07-16

## 2024-07-16 PROBLEM — M47.817 FACET ARTHROPATHY, LUMBOSACRAL: Status: ACTIVE | Noted: 2024-07-16

## 2024-07-16 PROBLEM — S31.109A OPEN WOUND OF ABDOMEN: Status: ACTIVE | Noted: 2024-07-16

## 2024-07-16 PROBLEM — K94.00 COMPLICATION OF COLOSTOMY (HCC): Status: ACTIVE | Noted: 2024-07-16

## 2024-07-16 PROBLEM — R25.1 TREMOR: Status: ACTIVE | Noted: 2024-07-16

## 2024-07-16 PROBLEM — F07.81 POSTCONCUSSION SYNDROME: Status: ACTIVE | Noted: 2024-07-16

## 2024-07-16 PROBLEM — G64 DISORDER OF PERIPHERAL NERVOUS SYSTEM: Status: ACTIVE | Noted: 2024-07-16

## 2024-07-16 PROBLEM — B35.1 ONYCHOMYCOSIS OF TOENAIL: Status: ACTIVE | Noted: 2024-07-16

## 2024-07-16 PROBLEM — R53.1 WEAKNESS: Status: ACTIVE | Noted: 2024-07-16

## 2024-07-16 PROBLEM — E66.01 MORBID OBESITY (HCC): Status: ACTIVE | Noted: 2024-07-16

## 2024-07-16 PROBLEM — M21.40 PES PLANUS: Status: ACTIVE | Noted: 2024-07-16

## 2024-07-16 PROBLEM — H40.9 GLAUCOMA: Status: ACTIVE | Noted: 2024-07-16

## 2024-07-16 PROBLEM — Z96.659 ARTIFICIAL KNEE JOINT PRESENT: Status: ACTIVE | Noted: 2024-07-16

## 2024-07-16 PROBLEM — M21.969 ACQUIRED DEFORMITY OF ANKLE AND FOOT: Status: ACTIVE | Noted: 2024-07-16

## 2024-07-16 PROBLEM — M43.10 ACQUIRED SPONDYLOLISTHESIS: Status: ACTIVE | Noted: 2024-07-16

## 2024-07-16 PROBLEM — M85.80 OSTEOPENIA: Status: ACTIVE | Noted: 2024-07-16

## 2024-07-16 PROBLEM — K56.699 COLONIC STRICTURE (HCC): Status: ACTIVE | Noted: 2020-11-18

## 2024-07-16 PROBLEM — R79.89 ABNORMAL LIVER FUNCTION TESTS: Status: ACTIVE | Noted: 2024-07-16

## 2024-07-16 PROBLEM — E87.8 ELECTROLYTE DISORDER: Status: ACTIVE | Noted: 2020-09-03

## 2024-07-17 ENCOUNTER — OFFICE VISIT (OUTPATIENT)
Dept: NEPHROLOGY | Age: 69
End: 2024-07-17
Payer: MEDICARE

## 2024-07-17 VITALS
OXYGEN SATURATION: 94 % | DIASTOLIC BLOOD PRESSURE: 82 MMHG | HEIGHT: 59 IN | SYSTOLIC BLOOD PRESSURE: 144 MMHG | BODY MASS INDEX: 41.53 KG/M2 | WEIGHT: 206 LBS | HEART RATE: 83 BPM

## 2024-07-17 DIAGNOSIS — I12.9 HYPERTENSIVE RENAL DISEASE, STAGE 1 THROUGH STAGE 4 OR UNSPECIFIED CHRONIC KIDNEY DISEASE: ICD-10-CM

## 2024-07-17 DIAGNOSIS — N18.31 CHRONIC KIDNEY DISEASE, STAGE 3A (HCC): Primary | ICD-10-CM

## 2024-07-17 PROCEDURE — 1036F TOBACCO NON-USER: CPT | Performed by: INTERNAL MEDICINE

## 2024-07-17 PROCEDURE — G8417 CALC BMI ABV UP PARAM F/U: HCPCS | Performed by: INTERNAL MEDICINE

## 2024-07-17 PROCEDURE — 3079F DIAST BP 80-89 MM HG: CPT | Performed by: INTERNAL MEDICINE

## 2024-07-17 PROCEDURE — 3077F SYST BP >= 140 MM HG: CPT | Performed by: INTERNAL MEDICINE

## 2024-07-17 PROCEDURE — 3017F COLORECTAL CA SCREEN DOC REV: CPT | Performed by: INTERNAL MEDICINE

## 2024-07-17 PROCEDURE — G8427 DOCREV CUR MEDS BY ELIG CLIN: HCPCS | Performed by: INTERNAL MEDICINE

## 2024-07-17 PROCEDURE — 1090F PRES/ABSN URINE INCON ASSESS: CPT | Performed by: INTERNAL MEDICINE

## 2024-07-17 PROCEDURE — 99213 OFFICE O/P EST LOW 20 MIN: CPT | Performed by: INTERNAL MEDICINE

## 2024-07-17 PROCEDURE — G8399 PT W/DXA RESULTS DOCUMENT: HCPCS | Performed by: INTERNAL MEDICINE

## 2024-07-17 PROCEDURE — 1123F ACP DISCUSS/DSCN MKR DOCD: CPT | Performed by: INTERNAL MEDICINE

## 2024-07-17 NOTE — PROGRESS NOTES
[facility]  [facility]  [address]  [facility] [address]  Dept: [phone]  Loc: [phone]  Office Progress Note  2024 2:33 PM      Pt Name:    Kailee Cummins  YOB: 1955  Primary Care Physician:  Pankaj Hong DO     Chief Complaint:   Chief Complaint   Patient presents with    Follow-up: CKD        Background Information/Interval History:   68 yo female with HTN, atrial fibrillation, TONEY, hx NSAID (ibuprofen and Mobic), Knee/hip replacement who underwent left knee replacement back in 2020 and developed Atrial fibrillation (Dr. oGncalves) with RVR and GRANT. She was managed expectantly. She was noted to have B/L hydronephrosis but nuclear scan was negative. She was also noted to have complex renal lesion incidentally. I referred her to urology. Further evaluation and work-up revealed pelvic mass and underwent resection and colostomy placement with subsequent reversal.  She is now following with [facility].     She is here for 12 months follow-up.  No new complaints. She feels well. No leg swelling. No urinary c/o.      Past History:  Past Medical History:   Diagnosis Date    Hyperlipemia 2012    Hyperlipidemia     Hypertension     Hypothyroidism     Morbid obesity with BMI of 45.0-49.9, adult (HCC)     TONEY on CPAP 2020    PAF (paroxysmal atrial fibrillation) (HCC)     Seizures (HCC)     last sz 20+years ago    Vitamin D deficiency      Past Surgical History:   Procedure Laterality Date    APPENDECTOMY      CATARACT REMOVAL       SECTION      CHOLECYSTECTOMY      TOTAL HIP ARTHROPLASTY Left 2012    TOTAL KNEE ARTHROPLASTY Left 2020        VITALS:  BP (!) 144/82 (Site: Right Upper Arm, Position: Sitting, Cuff Size: Large Adult)   Pulse 83   Ht 1.499 m (4' 11\")   Wt 93.4 kg (206 lb)   SpO2 94%   BMI 41.61 kg/m²   Wt Readings from Last 3 Encounters:   24 93.4 kg (206 lb)

## 2025-06-18 ENCOUNTER — TELEPHONE (OUTPATIENT)
Dept: NEPHROLOGY | Age: 70
End: 2025-06-18

## 2025-06-19 DIAGNOSIS — N18.31 CHRONIC KIDNEY DISEASE, STAGE 3A (HCC): ICD-10-CM

## 2025-06-19 DIAGNOSIS — I12.9 HYPERTENSIVE RENAL DISEASE, STAGE 1 THROUGH STAGE 4 OR UNSPECIFIED CHRONIC KIDNEY DISEASE: ICD-10-CM

## 2025-07-16 ENCOUNTER — OFFICE VISIT (OUTPATIENT)
Dept: NEPHROLOGY | Age: 70
End: 2025-07-16
Payer: MEDICARE

## 2025-07-16 VITALS
BODY MASS INDEX: 42.54 KG/M2 | OXYGEN SATURATION: 93 % | SYSTOLIC BLOOD PRESSURE: 140 MMHG | HEIGHT: 59 IN | WEIGHT: 211 LBS | DIASTOLIC BLOOD PRESSURE: 77 MMHG | HEART RATE: 94 BPM

## 2025-07-16 DIAGNOSIS — N18.2 CKD (CHRONIC KIDNEY DISEASE), STAGE II: Primary | ICD-10-CM

## 2025-07-16 DIAGNOSIS — I12.9 HYPERTENSIVE RENAL DISEASE, STAGE 1 THROUGH STAGE 4 OR UNSPECIFIED CHRONIC KIDNEY DISEASE: ICD-10-CM

## 2025-07-16 DIAGNOSIS — N28.1 SIMPLE RENAL CYST: ICD-10-CM

## 2025-07-16 PROCEDURE — 3077F SYST BP >= 140 MM HG: CPT | Performed by: INTERNAL MEDICINE

## 2025-07-16 PROCEDURE — G8417 CALC BMI ABV UP PARAM F/U: HCPCS | Performed by: INTERNAL MEDICINE

## 2025-07-16 PROCEDURE — G8399 PT W/DXA RESULTS DOCUMENT: HCPCS | Performed by: INTERNAL MEDICINE

## 2025-07-16 PROCEDURE — 99213 OFFICE O/P EST LOW 20 MIN: CPT | Performed by: INTERNAL MEDICINE

## 2025-07-16 PROCEDURE — G8427 DOCREV CUR MEDS BY ELIG CLIN: HCPCS | Performed by: INTERNAL MEDICINE

## 2025-07-16 PROCEDURE — 3078F DIAST BP <80 MM HG: CPT | Performed by: INTERNAL MEDICINE

## 2025-07-16 PROCEDURE — 1159F MED LIST DOCD IN RCRD: CPT | Performed by: INTERNAL MEDICINE

## 2025-07-16 PROCEDURE — 1036F TOBACCO NON-USER: CPT | Performed by: INTERNAL MEDICINE

## 2025-07-16 PROCEDURE — 1090F PRES/ABSN URINE INCON ASSESS: CPT | Performed by: INTERNAL MEDICINE

## 2025-07-16 PROCEDURE — 1123F ACP DISCUSS/DSCN MKR DOCD: CPT | Performed by: INTERNAL MEDICINE

## 2025-07-16 PROCEDURE — 3017F COLORECTAL CA SCREEN DOC REV: CPT | Performed by: INTERNAL MEDICINE

## 2025-07-16 NOTE — PROGRESS NOTES
[facility]  [facility]  [address]  [facility] [address]  Dept: [phone]  Loc: [phone]  Office Progress Note  2025 2:14 PM      Pt Name:    Kailee Cummins  YOB: 1955  Primary Care Physician:  Jenn Madison, APRN - CNP     Chief Complaint:   Chief Complaint   Patient presents with    Follow-up: CKD        Background Information/Interval History:   69 yo female with HTN, atrial fibrillation, TONEY, hx NSAID (ibuprofen and Mobic), Knee/hip replacement who underwent left knee replacement back in 2020 and developed Atrial fibrillation (Dr. Goncalves) with RVR and GRANT. She was managed expectantly. She was noted to have B/L hydronephrosis but nuclear scan was negative. She was also noted to have complex renal lesion incidentally. I referred her to urology. Further evaluation and work-up revealed pelvic mass and underwent resection and colostomy placement with subsequent reversal.  She is now following with [facility].     She is here for 12 months follow-up.  No new complaints. She feels well. Had knee surgery in 2025.      Past History:  Past Medical History:   Diagnosis Date    Hyperlipemia 2012    Hyperlipidemia     Hypertension     Hypothyroidism     Morbid obesity with BMI of 45.0-49.9, adult (HCC)     TONEY on CPAP 2020    PAF (paroxysmal atrial fibrillation) (HCC)     Seizures (HCC)     last sz 20+years ago    Vitamin D deficiency      Past Surgical History:   Procedure Laterality Date    APPENDECTOMY      CATARACT REMOVAL       SECTION      CHOLECYSTECTOMY      TOTAL HIP ARTHROPLASTY Left 2012    TOTAL KNEE ARTHROPLASTY Left 2020        VITALS:  BP (!) 140/77 (BP Site: Right Upper Arm, Patient Position: Sitting, BP Cuff Size: Large Adult)   Pulse 94   Ht 1.499 m (4' 11\")   Wt 95.7 kg (211 lb)   SpO2 93%   BMI 42.62 kg/m²   Wt Readings from Last 3 Encounters:   25